# Patient Record
Sex: FEMALE | Race: WHITE | NOT HISPANIC OR LATINO | Employment: OTHER | URBAN - METROPOLITAN AREA
[De-identification: names, ages, dates, MRNs, and addresses within clinical notes are randomized per-mention and may not be internally consistent; named-entity substitution may affect disease eponyms.]

---

## 2017-02-10 ENCOUNTER — OFFICE VISIT (OUTPATIENT)
Dept: INTERNAL MEDICINE | Facility: CLINIC | Age: 82
End: 2017-02-10

## 2017-02-10 VITALS
OXYGEN SATURATION: 95 % | RESPIRATION RATE: 18 BRPM | BODY MASS INDEX: 23.3 KG/M2 | HEART RATE: 75 BPM | WEIGHT: 145 LBS | TEMPERATURE: 97.8 F | SYSTOLIC BLOOD PRESSURE: 132 MMHG | DIASTOLIC BLOOD PRESSURE: 74 MMHG | HEIGHT: 66 IN

## 2017-02-10 DIAGNOSIS — J20.8 ACUTE BRONCHITIS DUE TO OTHER SPECIFIED ORGANISMS: Primary | ICD-10-CM

## 2017-02-10 PROCEDURE — 99213 OFFICE O/P EST LOW 20 MIN: CPT | Performed by: INTERNAL MEDICINE

## 2017-02-10 RX ORDER — GUAIFENESIN AND CODEINE PHOSPHATE 100; 10 MG/5ML; MG/5ML
5 SOLUTION ORAL 3 TIMES DAILY PRN
Qty: 118 ML | Refills: 0 | Status: SHIPPED | OUTPATIENT
Start: 2017-02-10 | End: 2017-08-16 | Stop reason: SDUPTHER

## 2017-02-10 RX ORDER — DOXYCYCLINE 100 MG/1
100 CAPSULE ORAL 2 TIMES DAILY
Qty: 20 CAPSULE | Refills: 0 | Status: SHIPPED | OUTPATIENT
Start: 2017-02-10 | End: 2017-08-16

## 2017-02-10 NOTE — PROGRESS NOTES
Subjective     Cristela Peralta is a 88 y.o. female who presents with   Chief Complaint   Patient presents with   • URI     hoarse, voice loss, cough, x5 days       History of Present Illness     Sick for five days.  Hoarse.  No fever.  Cold with production.  Taking herbals OTC.  Sinus congestion but no pain.  No ear pain.  Very tired.  No SOA.  NO chest pain.  No achiness.      Review of Systems   Constitutional: Negative for fever.   Respiratory: Negative for shortness of breath.    Cardiovascular: Negative for chest pain.       The following portions of the patient's history were reviewed and updated as appropriate: allergies, current medications and problem list.    Patient Active Problem List    Diagnosis Date Noted   • Vitamin D deficiency 10/14/2016   • Depression 05/13/2016   • Diastolic dysfunction 05/13/2016   • Hyperlipidemia 05/13/2016   • Hypertension 05/13/2016   • Chronic low back pain 05/13/2016   • Osteoarthritis 05/13/2016   • Osteopenia 05/13/2016     Note Last Updated: 5/13/2016     S/p five years of bisphosphonates.           Current Outpatient Prescriptions on File Prior to Visit   Medication Sig Dispense Refill   • acyclovir (ZOVIRAX) 5 % cream Apply  topically.     • aspirin 81 MG tablet Take  by mouth daily.     • celecoxib (CeleBREX) 200 MG capsule Take 1 capsule by mouth daily. 90 capsule 0   • citalopram (CeleXA) 10 MG tablet Take 1 tablet by mouth daily. 90 tablet 0   • Co-Enzyme Q-10 15 MG tablet Take  by mouth.     • hydrochlorothiazide (HYDRODIURIL) 12.5 MG tablet Take 1 tablet by mouth daily. 90 tablet 0   • loratadine (CLARITIN) 10 MG tablet Take  by mouth.     • meclizine (ANTIVERT) 25 MG tablet Take  by mouth.     • Multiple Vitamins-Minerals (CENTRUM SILVER) tablet Take  by mouth.     • Omega-3 Fatty Acids (FISH OIL TRIPLE STRENGTH) 1400 MG capsule Take 1 capsule by mouth.     • prednisoLONE acetate (PRED FORTE) 1 % ophthalmic suspension Apply  to eye.     • valACYclovir  "(VALTREX) 500 MG tablet Take  by mouth 2 (two) times a day.     • vitamin C (ASCORBIC ACID) 500 MG tablet Take  by mouth.       No current facility-administered medications on file prior to visit.        Objective     Visit Vitals   • /74   • Pulse 75   • Temp 97.8 °F (36.6 °C)   • Resp 18   • Ht 66\" (167.6 cm)   • Wt 145 lb (65.8 kg)   • SpO2 95%   • BMI 23.4 kg/m2       Physical Exam   Constitutional: She is oriented to person, place, and time. She appears well-developed and well-nourished.   HENT:   Head: Normocephalic and atraumatic.   Right Ear: Hearing and tympanic membrane normal.   Left Ear: Hearing and tympanic membrane normal.   Mouth/Throat: No oropharyngeal exudate or posterior oropharyngeal erythema.   Cardiovascular: Normal rate, regular rhythm and normal heart sounds.    Pulmonary/Chest: Effort normal and breath sounds normal.   Neurological: She is alert and oriented to person, place, and time.   Skin: Skin is warm and dry.   Psychiatric: She has a normal mood and affect. Her behavior is normal.       Assessment/Plan   [place] was seen today for uri.    Diagnoses and all orders for this visit:    Acute bronchitis due to other specified organisms    Other orders  -     guaifenesin-codeine (GUAIFENESIN AC) 100-10 MG/5ML liquid; Take 5 mL by mouth 3 (Three) Times a Day As Needed for cough.  -     doxycycline (MONODOX) 100 MG capsule; Take 1 capsule by mouth 2 (Two) Times a Day.        Discussion  Patient presents with episodes of acute bronchitis.  A prescription for antibiotics is provided today.  The patient is instructed to take along with Mucinex DM and prn codeine at night.  Let me know they are not feeling better over the next 3 days or if there is any change in symptoms.             Future Appointments  Date Time Provider Department Center   10/11/2017 10:00 AM [facility] None   10/18/2017 10:00 AM [facility] None         "

## 2017-07-26 ENCOUNTER — TELEPHONE (OUTPATIENT)
Dept: INTERNAL MEDICINE | Facility: CLINIC | Age: 82
End: 2017-07-26

## 2017-07-26 DIAGNOSIS — M54.50 BILATERAL LOW BACK PAIN WITHOUT SCIATICA, UNSPECIFIED CHRONICITY: Primary | ICD-10-CM

## 2017-07-26 NOTE — TELEPHONE ENCOUNTER
----- Message from Arelis Lara MD sent at 7/26/2017  3:04 PM EDT -----  Order placed.  Have someone fax.   ----- Message -----     From: Tere Cuevas MA     Sent: 7/26/2017  12:58 PM       To: Arelis Lara MD    She needs a order sent to [facility] for re evaluation on her back fax [phone]

## 2017-08-16 ENCOUNTER — OFFICE VISIT (OUTPATIENT)
Dept: INTERNAL MEDICINE | Facility: CLINIC | Age: 82
End: 2017-08-16

## 2017-08-16 VITALS
DIASTOLIC BLOOD PRESSURE: 82 MMHG | WEIGHT: 146 LBS | TEMPERATURE: 97.8 F | OXYGEN SATURATION: 95 % | HEART RATE: 74 BPM | BODY MASS INDEX: 23.46 KG/M2 | SYSTOLIC BLOOD PRESSURE: 134 MMHG | HEIGHT: 66 IN

## 2017-08-16 DIAGNOSIS — J20.8 ACUTE BRONCHITIS DUE TO OTHER SPECIFIED ORGANISMS: Primary | ICD-10-CM

## 2017-08-16 PROCEDURE — 99213 OFFICE O/P EST LOW 20 MIN: CPT | Performed by: INTERNAL MEDICINE

## 2017-08-16 RX ORDER — GUAIFENESIN AND CODEINE PHOSPHATE 100; 10 MG/5ML; MG/5ML
5 SOLUTION ORAL 3 TIMES DAILY PRN
Qty: 118 ML | Refills: 0 | Status: SHIPPED | OUTPATIENT
Start: 2017-08-16 | End: 2017-09-15 | Stop reason: SDUPTHER

## 2017-08-16 RX ORDER — DOXYCYCLINE 100 MG/1
100 CAPSULE ORAL 2 TIMES DAILY
Qty: 20 CAPSULE | Refills: 0 | Status: SHIPPED | OUTPATIENT
Start: 2017-08-16 | End: 2017-09-15 | Stop reason: SDUPTHER

## 2017-08-16 NOTE — PROGRESS NOTES
Subjective     Cristela Peralta is a 89 y.o. female who presents with   Chief Complaint   Patient presents with   • URI   • Nasal Congestion   • Cough       History of Present Illness     Sick since Sunday.  Cough which is non-productive.  No SOA/wheezing.  No fever.  Sore throat.      Review of Systems   HENT: Positive for congestion.        The following portions of the patient's history were reviewed and updated as appropriate: allergies, current medications and problem list.    Patient Active Problem List    Diagnosis Date Noted   • Vitamin D deficiency 10/14/2016   • Depression 05/13/2016   • Diastolic dysfunction 05/13/2016   • Hyperlipidemia 05/13/2016   • Hypertension 05/13/2016   • Chronic low back pain 05/13/2016   • Osteoarthritis 05/13/2016   • Osteopenia 05/13/2016     Note Last Updated: 5/13/2016     S/p five years of bisphosphonates.           Current Outpatient Prescriptions on File Prior to Visit   Medication Sig Dispense Refill   • acyclovir (ZOVIRAX) 5 % cream Apply  topically.     • aspirin 81 MG tablet Take  by mouth daily.     • celecoxib (CeleBREX) 200 MG capsule Take 1 capsule by mouth daily. 90 capsule 0   • citalopram (CeleXA) 10 MG tablet Take 1 tablet by mouth daily. 90 tablet 0   • Co-Enzyme Q-10 15 MG tablet Take  by mouth.     • hydrochlorothiazide (HYDRODIURIL) 12.5 MG tablet Take 1 tablet by mouth daily. 90 tablet 0   • loratadine (CLARITIN) 10 MG tablet Take  by mouth.     • meclizine (ANTIVERT) 25 MG tablet Take  by mouth.     • Multiple Vitamins-Minerals (CENTRUM SILVER) tablet Take  by mouth.     • Omega-3 Fatty Acids (FISH OIL TRIPLE STRENGTH) 1400 MG capsule Take 1 capsule by mouth.     • prednisoLONE acetate (PRED FORTE) 1 % ophthalmic suspension Apply  to eye.     • valACYclovir (VALTREX) 500 MG tablet Take  by mouth 2 (two) times a day.     • vitamin C (ASCORBIC ACID) 500 MG tablet Take  by mouth.     • [DISCONTINUED] guaifenesin-codeine (GUAIFENESIN AC) 100-10 MG/5ML  "liquid Take 5 mL by mouth 3 (Three) Times a Day As Needed for cough. 118 mL 0   • [DISCONTINUED] doxycycline (MONODOX) 100 MG capsule Take 1 capsule by mouth 2 (Two) Times a Day. 20 capsule 0     No current facility-administered medications on file prior to visit.        Objective     /82  Pulse 74  Temp 97.8 °F (36.6 °C)  Ht 66\" (167.6 cm)  Wt 146 lb (66.2 kg)  SpO2 95%  BMI 23.57 kg/m2    Physical Exam   Constitutional: She is oriented to person, place, and time. She appears well-developed and well-nourished.   HENT:   Head: Normocephalic and atraumatic.   Right Ear: Hearing and tympanic membrane normal.   Left Ear: Hearing and tympanic membrane normal.   Mouth/Throat: No oropharyngeal exudate or posterior oropharyngeal erythema.   Cardiovascular: Normal rate, regular rhythm and normal heart sounds.    Pulmonary/Chest: Effort normal and breath sounds normal.   Neurological: She is alert and oriented to person, place, and time.   Skin: Skin is warm and dry.   Psychiatric: She has a normal mood and affect. Her behavior is normal.       Assessment/Plan   [place]ia was seen today for uri, nasal congestion and cough.    Diagnoses and all orders for this visit:    Acute bronchitis due to other specified organisms    Other orders  -     guaifenesin-codeine (GUAIFENESIN AC) 100-10 MG/5ML liquid; Take 5 mL by mouth 3 (Three) Times a Day As Needed for Cough.  -     doxycycline (MONODOX) 100 MG capsule; Take 1 capsule by mouth 2 (Two) Times a Day.        Discussion  Patient presents with episodes of acute bronchitis.  A prescription for antibiotics is provided today.  The patient is instructed to take along with Mucinex DM and prn nighttime cough syrup.  Let me know they are not feeling better over the next 3 days or if there is any change in symptoms.             Future Appointments  Date Time Provider Department Center   10/11/2017 10:00 AM [facility] None   10/18/2017 10:00 AM Arelis TORRES" MD [facility] None

## 2017-09-15 ENCOUNTER — OFFICE VISIT (OUTPATIENT)
Dept: INTERNAL MEDICINE | Facility: CLINIC | Age: 82
End: 2017-09-15

## 2017-09-15 VITALS
WEIGHT: 155 LBS | RESPIRATION RATE: 18 BRPM | BODY MASS INDEX: 24.91 KG/M2 | HEART RATE: 84 BPM | SYSTOLIC BLOOD PRESSURE: 132 MMHG | HEIGHT: 66 IN | DIASTOLIC BLOOD PRESSURE: 70 MMHG | OXYGEN SATURATION: 98 %

## 2017-09-15 DIAGNOSIS — R05.3 PERSISTENT COUGH FOR 3 WEEKS OR LONGER: Primary | ICD-10-CM

## 2017-09-15 DIAGNOSIS — J20.8 ACUTE BRONCHITIS DUE TO OTHER SPECIFIED ORGANISMS: ICD-10-CM

## 2017-09-15 PROCEDURE — 99213 OFFICE O/P EST LOW 20 MIN: CPT | Performed by: INTERNAL MEDICINE

## 2017-09-15 PROCEDURE — 71020 XR CHEST PA AND LATERAL: CPT | Performed by: INTERNAL MEDICINE

## 2017-09-15 RX ORDER — GUAIFENESIN AND CODEINE PHOSPHATE 100; 10 MG/5ML; MG/5ML
5 SOLUTION ORAL 3 TIMES DAILY PRN
Qty: 118 ML | Refills: 0 | Status: SHIPPED | OUTPATIENT
Start: 2017-09-15 | End: 2017-10-18

## 2017-09-15 RX ORDER — METHYLPREDNISOLONE 4 MG/1
TABLET ORAL
Qty: 21 TABLET | Refills: 0 | Status: SHIPPED | OUTPATIENT
Start: 2017-09-15 | End: 2017-10-18

## 2017-09-15 RX ORDER — DOXYCYCLINE 100 MG/1
100 CAPSULE ORAL 2 TIMES DAILY
Qty: 20 CAPSULE | Refills: 0 | Status: SHIPPED | OUTPATIENT
Start: 2017-09-15 | End: 2017-10-18

## 2017-09-15 NOTE — PROGRESS NOTES
Subjective     Cristela Peralta is a 89 y.o. female who presents with   Chief Complaint   Patient presents with   • Cough     x 1 month, congested,        History of Present Illness     Persistent cough.  Phlegm is clear.  No fever.  No SOA.  Nose runs.  No ear pain.  No sore throat.  Coughs at night.  No SOA.  No improvement with antibiotic.      Review of Systems   Constitutional: Negative for fever.   HENT: Negative.    Respiratory: Positive for cough.    Cardiovascular: Negative for chest pain.       The following portions of the patient's history were reviewed and updated as appropriate: allergies, current medications and problem list.    Patient Active Problem List    Diagnosis Date Noted   • Vitamin D deficiency 10/14/2016   • Depression 05/13/2016   • Diastolic dysfunction 05/13/2016   • Hyperlipidemia 05/13/2016   • Hypertension 05/13/2016   • Chronic low back pain 05/13/2016   • Osteoarthritis 05/13/2016   • Osteopenia 05/13/2016     Note Last Updated: 5/13/2016     S/p five years of bisphosphonates.           Current Outpatient Prescriptions on File Prior to Visit   Medication Sig Dispense Refill   • acyclovir (ZOVIRAX) 5 % cream Apply  topically.     • aspirin 81 MG tablet Take  by mouth daily.     • celecoxib (CeleBREX) 200 MG capsule Take 1 capsule by mouth daily. 90 capsule 0   • citalopram (CeleXA) 10 MG tablet Take 1 tablet by mouth daily. 90 tablet 0   • Co-Enzyme Q-10 15 MG tablet Take  by mouth.     • hydrochlorothiazide (HYDRODIURIL) 12.5 MG tablet Take 1 tablet by mouth daily. 90 tablet 0   • loratadine (CLARITIN) 10 MG tablet Take  by mouth.     • meclizine (ANTIVERT) 25 MG tablet Take  by mouth.     • Multiple Vitamins-Minerals (CENTRUM SILVER) tablet Take  by mouth.     • Omega-3 Fatty Acids (FISH OIL TRIPLE STRENGTH) 1400 MG capsule Take 1 capsule by mouth.     • prednisoLONE acetate (PRED FORTE) 1 % ophthalmic suspension Apply  to eye.     • valACYclovir (VALTREX) 500 MG tablet Take  by  "mouth 2 (two) times a day.     • vitamin C (ASCORBIC ACID) 500 MG tablet Take  by mouth.     • [DISCONTINUED] doxycycline (MONODOX) 100 MG capsule Take 1 capsule by mouth 2 (Two) Times a Day. 20 capsule 0   • [DISCONTINUED] guaifenesin-codeine (GUAIFENESIN AC) 100-10 MG/5ML liquid Take 5 mL by mouth 3 (Three) Times a Day As Needed for Cough. 118 mL 0     No current facility-administered medications on file prior to visit.        Objective     /70  Pulse 84  Resp 18  Ht 66\" (167.6 cm)  Wt 155 lb (70.3 kg)  SpO2 98%  BMI 25.02 kg/m2      Physical Exam   Constitutional: She is oriented to person, place, and time. She appears well-developed and well-nourished.   HENT:   Head: Normocephalic and atraumatic.   Right Ear: Hearing and tympanic membrane normal.   Left Ear: Hearing and tympanic membrane normal.   Mouth/Throat: No oropharyngeal exudate or posterior oropharyngeal erythema.   Cardiovascular: Normal rate, regular rhythm and normal heart sounds.    Pulmonary/Chest: Effort normal and breath sounds normal.   Neurological: She is alert and oriented to person, place, and time.   Skin: Skin is warm and dry.   Psychiatric: She has a normal mood and affect. Her behavior is normal.     Procedures    X-rays of the chest  performed today for following indication:   cough.  X-ray reveal nad.  There is no available x-ray for comparison.  X-ray sent to radiology for official interpretation and findings.        Assessment/Plan   [place] was seen today for cough.    Diagnoses and all orders for this visit:    Persistent cough for 3 weeks or longer  -     XR Chest PA & Lateral    Other orders  -     guaifenesin-codeine (GUAIFENESIN AC) 100-10 MG/5ML liquid; Take 5 mL by mouth 3 (Three) Times a Day As Needed for Cough.  -     MethylPREDNISolone (MEDROL, ROGERS,) 4 MG tablet; Take as directed on package instructions.  -     doxycycline (MONODOX) 100 MG capsule; Take 1 capsule by mouth 2 (Two) Times a " Day.        Discussion  Patient presents with a persistent cough.  CXR is NAD.  Rx for antibiotic and steroids.  Take with probiotic.  Let me know if not feeling better over the next 3 days or if there is any change in symptoms.           Future Appointments  Date Time Provider Department Center   10/11/2017 10:00 AM [facility] None   10/18/2017 10:00 AM Arelis Lara MD [facility] None

## 2017-10-11 ENCOUNTER — FLU SHOT (OUTPATIENT)
Dept: INTERNAL MEDICINE | Facility: CLINIC | Age: 82
End: 2017-10-11

## 2017-10-11 ENCOUNTER — LAB (OUTPATIENT)
Dept: INTERNAL MEDICINE | Facility: CLINIC | Age: 82
End: 2017-10-11

## 2017-10-11 DIAGNOSIS — E55.9 VITAMIN D DEFICIENCY: ICD-10-CM

## 2017-10-11 DIAGNOSIS — I10 ESSENTIAL HYPERTENSION: ICD-10-CM

## 2017-10-11 DIAGNOSIS — M19.90 OSTEOARTHRITIS, UNSPECIFIED OSTEOARTHRITIS TYPE, UNSPECIFIED SITE: ICD-10-CM

## 2017-10-11 DIAGNOSIS — M85.80 OSTEOPENIA, UNSPECIFIED LOCATION: ICD-10-CM

## 2017-10-11 DIAGNOSIS — Z00.00 HEALTHCARE MAINTENANCE: ICD-10-CM

## 2017-10-11 DIAGNOSIS — E78.5 HYPERLIPIDEMIA, UNSPECIFIED HYPERLIPIDEMIA TYPE: Primary | ICD-10-CM

## 2017-10-13 LAB
25(OH)D3+25(OH)D2 SERPL-MCNC: 25.2 NG/ML (ref 30–100)
ALBUMIN SERPL-MCNC: 4.1 G/DL (ref 3.5–5.2)
ALBUMIN/GLOB SERPL: 1.5 G/DL
ALP SERPL-CCNC: 92 U/L (ref 39–117)
ALT SERPL-CCNC: 11 U/L (ref 1–33)
APPEARANCE UR: CLEAR
AST SERPL-CCNC: 18 U/L (ref 1–32)
BACTERIA #/AREA URNS HPF: NORMAL /HPF
BACTERIA UR CULT: NORMAL
BACTERIA UR CULT: NORMAL
BASOPHILS # BLD AUTO: 0.02 10*3/MM3 (ref 0–0.2)
BASOPHILS NFR BLD AUTO: 0.5 % (ref 0–1.5)
BILIRUB SERPL-MCNC: 0.4 MG/DL (ref 0.1–1.2)
BILIRUB UR QL STRIP: NEGATIVE
BUN SERPL-MCNC: 17 MG/DL (ref 8–23)
BUN/CREAT SERPL: 23.3 (ref 7–25)
CALCIUM SERPL-MCNC: 9.8 MG/DL (ref 8.6–10.5)
CHLORIDE SERPL-SCNC: 98 MMOL/L (ref 98–107)
CHOLEST SERPL-MCNC: 239 MG/DL (ref 0–200)
CO2 SERPL-SCNC: 29.6 MMOL/L (ref 22–29)
COLOR UR: YELLOW
CREAT SERPL-MCNC: 0.73 MG/DL (ref 0.57–1)
EOSINOPHIL # BLD AUTO: 0.1 10*3/MM3 (ref 0–0.7)
EOSINOPHIL NFR BLD AUTO: 2.5 % (ref 0.3–6.2)
EPI CELLS #/AREA URNS HPF: NORMAL /HPF
ERYTHROCYTE [DISTWIDTH] IN BLOOD BY AUTOMATED COUNT: 13.9 % (ref 11.7–13)
GLOBULIN SER CALC-MCNC: 2.8 GM/DL
GLUCOSE SERPL-MCNC: 100 MG/DL (ref 65–99)
GLUCOSE UR QL: NEGATIVE
HCT VFR BLD AUTO: 38.4 % (ref 35.6–45.5)
HDLC SERPL-MCNC: 57 MG/DL (ref 40–60)
HGB BLD-MCNC: 12.4 G/DL (ref 11.9–15.5)
HGB UR QL STRIP: NEGATIVE
IMM GRANULOCYTES # BLD: 0 10*3/MM3 (ref 0–0.03)
IMM GRANULOCYTES NFR BLD: 0 % (ref 0–0.5)
KETONES UR QL STRIP: ABNORMAL
LDLC SERPL CALC-MCNC: 159 MG/DL (ref 0–100)
LEUKOCYTE ESTERASE UR QL STRIP: ABNORMAL
LYMPHOCYTES # BLD AUTO: 1.42 10*3/MM3 (ref 0.9–4.8)
LYMPHOCYTES NFR BLD AUTO: 35.9 % (ref 19.6–45.3)
MCH RBC QN AUTO: 31.8 PG (ref 26.9–32)
MCHC RBC AUTO-ENTMCNC: 32.3 G/DL (ref 32.4–36.3)
MCV RBC AUTO: 98.5 FL (ref 80.5–98.2)
MICRO URNS: ABNORMAL
MONOCYTES # BLD AUTO: 0.4 10*3/MM3 (ref 0.2–1.2)
MONOCYTES NFR BLD AUTO: 10.1 % (ref 5–12)
MUCOUS THREADS URNS QL MICRO: PRESENT /HPF
NEUTROPHILS # BLD AUTO: 2.01 10*3/MM3 (ref 1.9–8.1)
NEUTROPHILS NFR BLD AUTO: 51 % (ref 42.7–76)
NITRITE UR QL STRIP: NEGATIVE
PH UR STRIP: 6 [PH] (ref 5–7.5)
PLATELET # BLD AUTO: 238 10*3/MM3 (ref 140–500)
POTASSIUM SERPL-SCNC: 4.3 MMOL/L (ref 3.5–5.2)
PROT SERPL-MCNC: 6.9 G/DL (ref 6–8.5)
PROT UR QL STRIP: NEGATIVE
RBC # BLD AUTO: 3.9 10*6/MM3 (ref 3.9–5.2)
RBC #/AREA URNS HPF: NORMAL /HPF
SODIUM SERPL-SCNC: 139 MMOL/L (ref 136–145)
SP GR UR: 1.02 (ref 1–1.03)
T4 FREE SERPL-MCNC: 0.95 NG/DL (ref 0.93–1.7)
TRIGL SERPL-MCNC: 114 MG/DL (ref 0–150)
TSH SERPL DL<=0.005 MIU/L-ACNC: 5.17 MIU/ML (ref 0.27–4.2)
URINALYSIS REFLEX: ABNORMAL
UROBILINOGEN UR STRIP-MCNC: 0.2 MG/DL (ref 0.2–1)
VLDLC SERPL CALC-MCNC: 22.8 MG/DL (ref 5–40)
WBC # BLD AUTO: 3.95 10*3/MM3 (ref 4.5–10.7)
WBC #/AREA URNS HPF: NORMAL /HPF

## 2017-10-18 ENCOUNTER — OFFICE VISIT (OUTPATIENT)
Dept: INTERNAL MEDICINE | Facility: CLINIC | Age: 82
End: 2017-10-18

## 2017-10-18 VITALS
OXYGEN SATURATION: 98 % | HEART RATE: 72 BPM | DIASTOLIC BLOOD PRESSURE: 70 MMHG | WEIGHT: 146 LBS | SYSTOLIC BLOOD PRESSURE: 120 MMHG | BODY MASS INDEX: 23.57 KG/M2

## 2017-10-18 DIAGNOSIS — Z00.00 WELL ADULT EXAM: Primary | ICD-10-CM

## 2017-10-18 DIAGNOSIS — E78.5 HYPERLIPIDEMIA, UNSPECIFIED HYPERLIPIDEMIA TYPE: ICD-10-CM

## 2017-10-18 DIAGNOSIS — G89.29 CHRONIC BILATERAL LOW BACK PAIN, WITH SCIATICA PRESENCE UNSPECIFIED: ICD-10-CM

## 2017-10-18 DIAGNOSIS — M85.80 OSTEOPENIA, UNSPECIFIED LOCATION: ICD-10-CM

## 2017-10-18 DIAGNOSIS — Z78.0 MENOPAUSE: ICD-10-CM

## 2017-10-18 DIAGNOSIS — I10 ESSENTIAL HYPERTENSION: ICD-10-CM

## 2017-10-18 DIAGNOSIS — Z12.31 ENCOUNTER FOR SCREENING MAMMOGRAM FOR BREAST CANCER: ICD-10-CM

## 2017-10-18 DIAGNOSIS — M54.5 CHRONIC BILATERAL LOW BACK PAIN, WITH SCIATICA PRESENCE UNSPECIFIED: ICD-10-CM

## 2017-10-18 DIAGNOSIS — Z00.00 MEDICARE ANNUAL WELLNESS VISIT, SUBSEQUENT: ICD-10-CM

## 2017-10-18 PROCEDURE — 99397 PER PM REEVAL EST PAT 65+ YR: CPT | Performed by: INTERNAL MEDICINE

## 2017-10-18 PROCEDURE — 96160 PT-FOCUSED HLTH RISK ASSMT: CPT | Performed by: INTERNAL MEDICINE

## 2017-10-18 PROCEDURE — 93000 ELECTROCARDIOGRAM COMPLETE: CPT | Performed by: INTERNAL MEDICINE

## 2017-10-18 PROCEDURE — 77080 DXA BONE DENSITY AXIAL: CPT | Performed by: INTERNAL MEDICINE

## 2017-10-18 PROCEDURE — G0439 PPPS, SUBSEQ VISIT: HCPCS | Performed by: INTERNAL MEDICINE

## 2017-10-18 NOTE — PATIENT INSTRUCTIONS
Medicare Wellness  Personal Prevention Plan of Service     Date of Office Visit:  10/18/2017  Encounter Provider:  Arelis Lara MD  Place of Service:  [facility]  Patient Name: Cristela Peralta  :  1928    As part of the Medicare Wellness portion of your visit today, we are providing you with this personalized preventive plan of services (PPPS). This plan is based upon recommendations of the [place] Preventive Services Task Force (USPSTF) and the Advisory Committee on Immunization Practices (ACIP).    This lists the preventive care services that should be considered, and provides dates of when you are due. Items listed as completed are up-to-date and do not require any further intervention.    Health Maintenance   Topic Date Due   • TDAP/TD VACCINES (1 - Tdap) 2007   • MEDICARE ANNUAL WELLNESS  2016   • DXA SCAN  2017   • LIPID PANEL  10/11/2018   • MAMMOGRAM  2018   • INFLUENZA VACCINE  Completed   • PNEUMOCOCCAL VACCINES (65+ LOW/MEDIUM RISK)  Completed   • ZOSTER VACCINE  Completed       Orders Placed This Encounter   Procedures   • Mammo Screening Bilateral With CAD     Standing Status:   Future     Standing Expiration Date:   10/19/2018     Scheduling Instructions:      At [facility]     Order Specific Question:   Reason for Exam:     Answer:   screening   • DEXA Bone Density Axial     Scheduling Instructions:      [facility]     Order Specific Question:   Reason for Exam:     Answer:   osteopenia   • ECG 12 Lead     This order was created via procedure documentation       Return in about 1 year (around 10/18/2018) for Medicare Wellness.

## 2017-10-18 NOTE — PROGRESS NOTES
Subjective     Cristela Peralta is a 89 y.o. female who presents for an annual wellness visit as well as check up of htn, hld, depression, osteopenia.      History of Present Illness     HTN. Control is good at home.  HLD. She is on dietary control only.   Depression. On Celexa and well-controlled.  Osteopenia. On Calcium with D. Discussed trying to get calcium from her diet.  Chronic LBP.  She is maintained on Celebrex with good control.     Review of Systems   HENT: Positive for congestion.    Respiratory: Negative.    Cardiovascular: Negative.        The following portions of the patient's history were reviewed and updated as appropriate: allergies, current medications, past family history, past medical history, past social history, past surgical history and problem list.  Health maintenance tab was reviewed and updated with the patient.       Patient Active Problem List    Diagnosis Date Noted   • Vitamin D deficiency 10/14/2016   • Depression 05/13/2016   • Diastolic dysfunction 05/13/2016   • Hyperlipidemia 05/13/2016   • Hypertension 05/13/2016   • Chronic low back pain 05/13/2016   • Osteoarthritis 05/13/2016   • Osteopenia 05/13/2016     Note Last Updated: 5/13/2016     S/p five years of bisphosphonates.           Past Medical History:   Diagnosis Date   • Degeneration of cervical intervertebral disc    • H/O bone density study 12/11/2015   • History of depression    • History of diastolic dysfunction    • History of diverticulosis    • History of EKG 04/29/2015   • History of herpes genitalis    • History of mammogram 12/11/2015   • History of spinal stenosis    • History of vertigo    • Hyperlipidemia    • Hypertension    • Osteoarthritis    • Osteopenia    • Osteoporosis        Past Surgical History:   Procedure Laterality Date   • APPENDECTOMY     • HYSTERECTOMY     • KNEE SURGERY      Replacement       Family History   Problem Relation Age of Onset   • Aneurysm Father    • Heart disease Father    •  Colon cancer Sister    • Throat cancer Sister        Social History     Social History   • Marital status:      Spouse name: N/A   • Number of children: N/A   • Years of education: N/A     Occupational History   • Not on file.     Social History Main Topics   • Smoking status: Never Smoker   • Smokeless tobacco: Not on file   • Alcohol use No   • Drug use: Not on file   • Sexual activity: Not on file     Other Topics Concern   • Not on file     Social History Narrative       Current Outpatient Prescriptions on File Prior to Visit   Medication Sig Dispense Refill   • acyclovir (ZOVIRAX) 5 % cream Apply  topically.     • aspirin 81 MG tablet Take  by mouth daily.     • celecoxib (CeleBREX) 200 MG capsule Take 1 capsule by mouth daily. 90 capsule 0   • citalopram (CeleXA) 10 MG tablet Take 1 tablet by mouth daily. 90 tablet 0   • Co-Enzyme Q-10 15 MG tablet Take  by mouth.     • hydrochlorothiazide (HYDRODIURIL) 12.5 MG tablet Take 1 tablet by mouth daily. 90 tablet 0   • loratadine (CLARITIN) 10 MG tablet Take  by mouth.     • meclizine (ANTIVERT) 25 MG tablet Take  by mouth.     • Multiple Vitamins-Minerals (CENTRUM SILVER) tablet Take  by mouth.     • Omega-3 Fatty Acids (FISH OIL TRIPLE STRENGTH) 1400 MG capsule Take 1 capsule by mouth.     • prednisoLONE acetate (PRED FORTE) 1 % ophthalmic suspension Apply  to eye.     • valACYclovir (VALTREX) 500 MG tablet Take  by mouth 2 (two) times a day.     • vitamin C (ASCORBIC ACID) 500 MG tablet Take  by mouth.     • [DISCONTINUED] doxycycline (MONODOX) 100 MG capsule Take 1 capsule by mouth 2 (Two) Times a Day. 20 capsule 0   • [DISCONTINUED] guaifenesin-codeine (GUAIFENESIN AC) 100-10 MG/5ML liquid Take 5 mL by mouth 3 (Three) Times a Day As Needed for Cough. 118 mL 0   • [DISCONTINUED] MethylPREDNISolone (MEDROL, ROGERS,) 4 MG tablet Take as directed on package instructions. 21 tablet 0     No current facility-administered medications on file prior to visit.         Allergies   Allergen Reactions   • Alendronate GI Intolerance   • Atorvastatin    • Neomycin-Bacitracin Zn-Polymyx    • Pravastatin    • Sulfa Antibiotics        Immunization History   Administered Date(s) Administered   • Flu Vaccine High Dose PF 65YR+ 10/14/2016, 10/11/2017   • Influenza TIV (IM) 09/25/2015   • Pneumococcal Conjugate 10/20/2015   • Pneumococcal Polysaccharide 08/01/2011   • Td 01/01/2007       Objective     /70  Pulse 72  Wt 146 lb (66.2 kg)  SpO2 98%  BMI 23.57 kg/m2    Physical Exam   Constitutional: She is oriented to person, place, and time. She appears well-developed and well-nourished.   HENT:   Head: Normocephalic and atraumatic.   Right Ear: Hearing, tympanic membrane and external ear normal.   Left Ear: Hearing, tympanic membrane and external ear normal.   Nose: Nose normal.   Mouth/Throat: Oropharynx is clear and moist.   Neck: Neck supple. No thyromegaly present.   Cardiovascular: Normal rate, regular rhythm and normal heart sounds.    No murmur heard.  Pulmonary/Chest: Effort normal and breath sounds normal. Right breast exhibits no mass. Left breast exhibits no mass.   Abdominal: Soft. She exhibits no distension. There is no hepatosplenomegaly. There is no tenderness.   Genitourinary: No breast tenderness.   Lymphadenopathy:     She has no cervical adenopathy.   Neurological: She is alert and oriented to person, place, and time.   Skin: Skin is warm and dry.   Psychiatric: She has a normal mood and affect. Her speech is normal and behavior is normal. Judgment and thought content normal. Cognition and memory are normal.          ECG 12 Lead  Date/Time: 10/18/2017 10:43 AM  Performed by: FREDI GAY  Authorized by: FREDI GAY   Comparison: compared with previous ECG from 10/14/2016  Similar to previous ECG  Rhythm: sinus rhythm  Rate: normal  Conduction: conduction normal  ST Segments: ST segments normal  T Waves: T waves normal  QRS axis: normal  Clinical  impression: normal ECG              Assessment/Plan   [place] was seen today for annual exam.    Diagnoses and all orders for this visit:    Well adult exam    Medicare annual wellness visit, subsequent    Encounter for screening mammogram for breast cancer  -     Mammo Screening Bilateral With CAD; Future    Menopause  -     DEXA Bone Density Axial    Essential hypertension  -     ECG 12 Lead    Hyperlipidemia, unspecified hyperlipidemia type    Osteopenia, unspecified location    Chronic bilateral low back pain, with sciatica presence unspecified        Discussion    AWV.  See scanned forms for levi history, PHQ-9, functional ability questionnaire, cognitive impairment screening.  Direct observation of cognitive abilities:  The patient does not exhibit  any impairment in cognitive abilities upon direct observation at today's visit.   These were all reviewed with the patient and the patient was provided with a personal prevention plan of service in patient instructions.  Patient was given advice or information on the following topics:  nutrition   HTN. Continue current regimen.  HLD. Continue diet alone for now.   Depression. Continue citalopram.   Osteopenia. I recommend to get 1200 mg of calcium and 1000 IUs of vitamin D through diet and to participate in a weight based exercise to prevent loss of bone mineral density. Bone mineral will be monitored every two years.    Chronic LBP.  Continue with Celebrex.     Health Maintenance   Topic Date Due   • TDAP/TD VACCINES (1 - Tdap) 01/02/2007   • MEDICARE ANNUAL WELLNESS  03/18/2016   • DXA SCAN  12/11/2017   • LIPID PANEL  10/11/2018   • MAMMOGRAM  12/13/2018   • INFLUENZA VACCINE  Completed   • PNEUMOCOCCAL VACCINES (65+ LOW/MEDIUM RISK)  Completed   • ZOSTER VACCINE  Completed            No future appointments.

## 2017-11-07 ENCOUNTER — TELEPHONE (OUTPATIENT)
Dept: INTERNAL MEDICINE | Facility: CLINIC | Age: 82
End: 2017-11-07

## 2017-11-07 RX ORDER — HYDROCHLOROTHIAZIDE 12.5 MG/1
12.5 TABLET ORAL DAILY
Qty: 90 TABLET | Refills: 3 | Status: SHIPPED | OUTPATIENT
Start: 2017-11-07 | End: 2018-01-25 | Stop reason: SDUPTHER

## 2017-11-07 RX ORDER — CELECOXIB 200 MG/1
200 CAPSULE ORAL DAILY
Qty: 90 CAPSULE | Refills: 3 | Status: SHIPPED | OUTPATIENT
Start: 2017-11-07 | End: 2018-01-25 | Stop reason: SDUPTHER

## 2017-11-07 RX ORDER — CITALOPRAM 10 MG/1
10 TABLET ORAL DAILY
Qty: 90 TABLET | Refills: 3 | Status: SHIPPED | OUTPATIENT
Start: 2017-11-07 | End: 2018-01-25 | Stop reason: SDUPTHER

## 2017-11-07 NOTE — TELEPHONE ENCOUNTER
Patient needs a refill on Celebrex, Celexa, and hydrochlorothiazide. [phone][facility]

## 2017-12-14 ENCOUNTER — APPOINTMENT (OUTPATIENT)
Dept: WOMENS IMAGING | Facility: HOSPITAL | Age: 82
End: 2017-12-14

## 2017-12-14 PROCEDURE — 77063 BREAST TOMOSYNTHESIS BI: CPT | Performed by: RADIOLOGY

## 2017-12-14 PROCEDURE — G0202 SCR MAMMO BI INCL CAD: HCPCS | Performed by: RADIOLOGY

## 2017-12-14 PROCEDURE — 77080 DXA BONE DENSITY AXIAL: CPT | Performed by: RADIOLOGY

## 2018-01-02 DIAGNOSIS — Q25.40 ABNORMALITY OF THORACIC AORTA: Primary | ICD-10-CM

## 2018-01-10 ENCOUNTER — HOSPITAL ENCOUNTER (OUTPATIENT)
Dept: ULTRASOUND IMAGING | Facility: HOSPITAL | Age: 83
Discharge: HOME | End: 2018-01-10
Admitting: INTERNAL MEDICINE

## 2018-01-10 DIAGNOSIS — Q25.40 ABNORMALITY OF THORACIC AORTA: ICD-10-CM

## 2018-01-10 PROCEDURE — 76775 US EXAM ABDO BACK WALL LIM: CPT

## 2018-01-11 ENCOUNTER — TELEPHONE (OUTPATIENT)
Dept: INTERNAL MEDICINE | Facility: CLINIC | Age: 83
End: 2018-01-11

## 2018-01-11 NOTE — TELEPHONE ENCOUNTER
Patient daughter would like Dr. Lara or Tere to call her before you call he mother regarding her regarding her US results.

## 2018-01-25 ENCOUNTER — TELEPHONE (OUTPATIENT)
Dept: INTERNAL MEDICINE | Facility: CLINIC | Age: 83
End: 2018-01-25

## 2018-01-25 RX ORDER — HYDROCHLOROTHIAZIDE 12.5 MG/1
12.5 TABLET ORAL DAILY
Qty: 90 TABLET | Refills: 3 | Status: SHIPPED | OUTPATIENT
Start: 2018-01-25 | End: 2018-06-29 | Stop reason: SDUPTHER

## 2018-01-25 RX ORDER — CITALOPRAM 10 MG/1
10 TABLET ORAL DAILY
Qty: 90 TABLET | Refills: 3 | Status: SHIPPED | OUTPATIENT
Start: 2018-01-25 | End: 2018-06-29 | Stop reason: SDUPTHER

## 2018-01-25 RX ORDER — CELECOXIB 200 MG/1
200 CAPSULE ORAL DAILY
Qty: 90 CAPSULE | Refills: 3 | Status: SHIPPED | OUTPATIENT
Start: 2018-01-25 | End: 2018-06-29 | Stop reason: SDUPTHER

## 2018-01-25 NOTE — TELEPHONE ENCOUNTER
She would like a referral to [facility] to continue PT on her back. [facility]     Referal is placed.  Please fax to [facility].  BETHANIE

## 2018-01-26 DIAGNOSIS — M54.5 CHRONIC LOW BACK PAIN, UNSPECIFIED BACK PAIN LATERALITY, WITH SCIATICA PRESENCE UNSPECIFIED: Primary | ICD-10-CM

## 2018-01-26 DIAGNOSIS — G89.29 CHRONIC LOW BACK PAIN, UNSPECIFIED BACK PAIN LATERALITY, WITH SCIATICA PRESENCE UNSPECIFIED: Primary | ICD-10-CM

## 2018-06-29 RX ORDER — CELECOXIB 200 MG/1
200 CAPSULE ORAL DAILY
Qty: 90 CAPSULE | Refills: 3 | Status: SHIPPED | OUTPATIENT
Start: 2018-06-29 | End: 2018-10-26 | Stop reason: SDUPTHER

## 2018-06-29 RX ORDER — CITALOPRAM 10 MG/1
10 TABLET ORAL DAILY
Qty: 90 TABLET | Refills: 3 | Status: SHIPPED | OUTPATIENT
Start: 2018-06-29 | End: 2018-10-24 | Stop reason: SDUPTHER

## 2018-06-29 RX ORDER — HYDROCHLOROTHIAZIDE 12.5 MG/1
12.5 TABLET ORAL DAILY
Qty: 90 TABLET | Refills: 3 | Status: SHIPPED | OUTPATIENT
Start: 2018-06-29 | End: 2018-10-24 | Stop reason: SDUPTHER

## 2018-10-16 DIAGNOSIS — I10 ESSENTIAL HYPERTENSION: ICD-10-CM

## 2018-10-16 DIAGNOSIS — E55.9 VITAMIN D DEFICIENCY: ICD-10-CM

## 2018-10-16 DIAGNOSIS — E78.5 HYPERLIPIDEMIA, UNSPECIFIED HYPERLIPIDEMIA TYPE: Primary | ICD-10-CM

## 2018-10-17 ENCOUNTER — FLU SHOT (OUTPATIENT)
Dept: INTERNAL MEDICINE | Facility: CLINIC | Age: 83
End: 2018-10-17

## 2018-10-17 PROCEDURE — 90662 IIV NO PRSV INCREASED AG IM: CPT | Performed by: INTERNAL MEDICINE

## 2018-10-17 PROCEDURE — 90471 IMMUNIZATION ADMIN: CPT | Performed by: INTERNAL MEDICINE

## 2018-10-19 LAB
25(OH)D3+25(OH)D2 SERPL-MCNC: 32.6 NG/ML (ref 30–100)
ALBUMIN SERPL-MCNC: 4.4 G/DL (ref 3.5–5.2)
ALBUMIN/GLOB SERPL: 1.7 G/DL
ALP SERPL-CCNC: 105 U/L (ref 39–117)
ALT SERPL-CCNC: 11 U/L (ref 1–33)
APPEARANCE UR: CLEAR
AST SERPL-CCNC: 17 U/L (ref 1–32)
BACTERIA #/AREA URNS HPF: NORMAL /HPF
BASOPHILS # BLD AUTO: 0.02 10*3/MM3 (ref 0–0.2)
BASOPHILS NFR BLD AUTO: 0.5 % (ref 0–1.5)
BILIRUB SERPL-MCNC: 0.3 MG/DL (ref 0.1–1.2)
BILIRUB UR QL STRIP: NEGATIVE
BUN SERPL-MCNC: 18 MG/DL (ref 8–23)
BUN/CREAT SERPL: 25.7 (ref 7–25)
CALCIUM SERPL-MCNC: 10.1 MG/DL (ref 8.2–9.6)
CASTS URNS MICRO: NORMAL
CHLORIDE SERPL-SCNC: 99 MMOL/L (ref 98–107)
CHOLEST SERPL-MCNC: 213 MG/DL (ref 0–200)
CO2 SERPL-SCNC: 30 MMOL/L (ref 22–29)
COLOR UR: YELLOW
CREAT SERPL-MCNC: 0.7 MG/DL (ref 0.57–1)
EOSINOPHIL # BLD AUTO: 0.12 10*3/MM3 (ref 0–0.7)
EOSINOPHIL NFR BLD AUTO: 3.1 % (ref 0.3–6.2)
EPI CELLS #/AREA URNS HPF: NORMAL /HPF
ERYTHROCYTE [DISTWIDTH] IN BLOOD BY AUTOMATED COUNT: 13.6 % (ref 11.7–13)
GLOBULIN SER CALC-MCNC: 2.6 GM/DL
GLUCOSE SERPL-MCNC: 102 MG/DL (ref 65–99)
GLUCOSE UR QL: NEGATIVE
HCT VFR BLD AUTO: 40.3 % (ref 35.6–45.5)
HDLC SERPL-MCNC: 57 MG/DL (ref 40–60)
HGB BLD-MCNC: 12.6 G/DL (ref 11.9–15.5)
HGB UR QL STRIP: NEGATIVE
IMM GRANULOCYTES # BLD: 0 10*3/MM3 (ref 0–0.03)
IMM GRANULOCYTES NFR BLD: 0 % (ref 0–0.5)
KETONES UR QL STRIP: NEGATIVE
LDLC SERPL CALC-MCNC: 138 MG/DL (ref 0–100)
LEUKOCYTE ESTERASE UR QL STRIP: (no result)
LYMPHOCYTES # BLD AUTO: 1.11 10*3/MM3 (ref 0.9–4.8)
LYMPHOCYTES NFR BLD AUTO: 28.9 % (ref 19.6–45.3)
MCH RBC QN AUTO: 30.8 PG (ref 26.9–32)
MCHC RBC AUTO-ENTMCNC: 31.3 G/DL (ref 32.4–36.3)
MCV RBC AUTO: 98.5 FL (ref 80.5–98.2)
MONOCYTES # BLD AUTO: 0.42 10*3/MM3 (ref 0.2–1.2)
MONOCYTES NFR BLD AUTO: 10.9 % (ref 5–12)
NEUTROPHILS # BLD AUTO: 2.17 10*3/MM3 (ref 1.9–8.1)
NEUTROPHILS NFR BLD AUTO: 56.6 % (ref 42.7–76)
NITRITE UR QL STRIP: NEGATIVE
PH UR STRIP: 7 [PH] (ref 5–8)
PLATELET # BLD AUTO: 247 10*3/MM3 (ref 140–500)
POTASSIUM SERPL-SCNC: 4.9 MMOL/L (ref 3.5–5.2)
PROT SERPL-MCNC: 7 G/DL (ref 6–8.5)
PROT UR QL STRIP: NEGATIVE
RBC # BLD AUTO: 4.09 10*6/MM3 (ref 3.9–5.2)
RBC #/AREA URNS HPF: NORMAL /HPF
SODIUM SERPL-SCNC: 140 MMOL/L (ref 136–145)
SP GR UR: 1.02 (ref 1–1.03)
T4 FREE SERPL-MCNC: 1.08 NG/DL (ref 0.93–1.7)
TRIGL SERPL-MCNC: 90 MG/DL (ref 0–150)
TSH SERPL DL<=0.005 MIU/L-ACNC: 5.62 MIU/ML (ref 0.27–4.2)
UROBILINOGEN UR STRIP-MCNC: (no result) MG/DL
VLDLC SERPL CALC-MCNC: 18 MG/DL (ref 5–40)
WBC # BLD AUTO: 3.84 10*3/MM3 (ref 4.5–10.7)
WBC #/AREA URNS HPF: NORMAL /HPF

## 2018-10-24 ENCOUNTER — OFFICE VISIT (OUTPATIENT)
Dept: INTERNAL MEDICINE | Facility: CLINIC | Age: 83
End: 2018-10-24

## 2018-10-24 VITALS
BODY MASS INDEX: 23.14 KG/M2 | OXYGEN SATURATION: 97 % | WEIGHT: 144 LBS | DIASTOLIC BLOOD PRESSURE: 82 MMHG | RESPIRATION RATE: 15 BRPM | HEIGHT: 66 IN | HEART RATE: 73 BPM | SYSTOLIC BLOOD PRESSURE: 160 MMHG | TEMPERATURE: 97.8 F

## 2018-10-24 DIAGNOSIS — E78.5 HYPERLIPIDEMIA, UNSPECIFIED HYPERLIPIDEMIA TYPE: ICD-10-CM

## 2018-10-24 DIAGNOSIS — M19.90 OSTEOARTHRITIS, UNSPECIFIED OSTEOARTHRITIS TYPE, UNSPECIFIED SITE: ICD-10-CM

## 2018-10-24 DIAGNOSIS — I10 ESSENTIAL HYPERTENSION: ICD-10-CM

## 2018-10-24 DIAGNOSIS — Z00.00 MEDICARE ANNUAL WELLNESS VISIT, SUBSEQUENT: Primary | ICD-10-CM

## 2018-10-24 DIAGNOSIS — F32.0 MILD SINGLE CURRENT EPISODE OF MAJOR DEPRESSIVE DISORDER (HCC): ICD-10-CM

## 2018-10-24 PROCEDURE — 93000 ELECTROCARDIOGRAM COMPLETE: CPT | Performed by: INTERNAL MEDICINE

## 2018-10-24 PROCEDURE — G0439 PPPS, SUBSEQ VISIT: HCPCS | Performed by: INTERNAL MEDICINE

## 2018-10-24 PROCEDURE — 96160 PT-FOCUSED HLTH RISK ASSMT: CPT | Performed by: INTERNAL MEDICINE

## 2018-10-24 PROCEDURE — 99214 OFFICE O/P EST MOD 30 MIN: CPT | Performed by: INTERNAL MEDICINE

## 2018-10-24 RX ORDER — ACYCLOVIR 50 MG/G
OINTMENT TOPICAL EVERY 12 HOURS
Qty: 45 G | Refills: 11 | Status: SHIPPED | OUTPATIENT
Start: 2018-10-24 | End: 2018-12-11

## 2018-10-24 RX ORDER — CITALOPRAM 10 MG/1
10 TABLET ORAL DAILY
Qty: 90 TABLET | Refills: 3 | Status: SHIPPED | OUTPATIENT
Start: 2018-10-24 | End: 2019-10-31 | Stop reason: SDUPTHER

## 2018-10-24 RX ORDER — CETIRIZINE HYDROCHLORIDE 10 MG/1
10 TABLET ORAL DAILY
COMMUNITY
Start: 2018-10-24

## 2018-10-24 RX ORDER — ACYCLOVIR 50 MG/G
OINTMENT TOPICAL EVERY 12 HOURS
Qty: 15 G | Refills: 11 | Status: SHIPPED | OUTPATIENT
Start: 2018-10-24 | End: 2018-10-24 | Stop reason: SDUPTHER

## 2018-10-24 RX ORDER — HYDROCHLOROTHIAZIDE 12.5 MG/1
12.5 TABLET ORAL DAILY
Qty: 90 TABLET | Refills: 3 | Status: SHIPPED | OUTPATIENT
Start: 2018-10-24 | End: 2019-10-31 | Stop reason: SDUPTHER

## 2018-10-24 NOTE — PROGRESS NOTES
Subjective     Cristela Peralta is a 89 y.o. female who presents for an annual wellness visit as well as check up of htn, hld, depression.      History of Present Illness     HTN. Control is good at home.  HLD. She is on dietary control only.   Depression. On Celexa and well-controlled.  Chronic LBP.  She is maintained on Celebrex with good control.        Review of Systems   Constitutional: Negative.    HENT: Negative.    Eyes: Negative.    Respiratory: Negative.    Cardiovascular: Negative.    Gastrointestinal: Negative.    Endocrine: Negative.    Genitourinary: Negative.    Musculoskeletal: Negative.    Skin: Negative.    Allergic/Immunologic: Negative.    Neurological: Negative.    Hematological: Negative.    Psychiatric/Behavioral: Negative.        The following portions of the patient's history were reviewed and updated as appropriate: allergies, current medications, past family history, past medical history, past social history, past surgical history and problem list.  Health maintenance tab was reviewed and updated with the patient.       Patient Active Problem List    Diagnosis Date Noted   • Mild single current episode of major depressive disorder (CMS/[facility]) 10/24/2018   • Vitamin D deficiency 10/14/2016   • Diastolic dysfunction 05/13/2016   • Hyperlipidemia 05/13/2016   • Hypertension 05/13/2016   • Chronic low back pain 05/13/2016   • Osteoarthritis 05/13/2016   • Osteopenia 05/13/2016     Note Last Updated: 5/13/2016     S/p five years of bisphosphonates.           Past Medical History:   Diagnosis Date   • Degeneration of cervical intervertebral disc    • H/O bone density study 12/11/2015   • History of depression    • History of diastolic dysfunction    • History of diverticulosis    • History of EKG 04/29/2015   • History of herpes genitalis    • History of mammogram 12/11/2015   • History of spinal stenosis    • History of vertigo    • Hyperlipidemia    • Hypertension    • Osteoarthritis    •  Osteopenia    • Osteoporosis        Past Surgical History:   Procedure Laterality Date   • APPENDECTOMY     • HYSTERECTOMY     • KNEE SURGERY      Replacement       Family History   Problem Relation Age of Onset   • Aneurysm Father    • Heart disease Father    • Colon cancer Sister    • Throat cancer Sister        Social History     Social History   • Marital status:      Spouse name: N/A   • Number of children: N/A   • Years of education: N/A     Occupational History   • Not on file.     Social History Main Topics   • Smoking status: Never Smoker   • Smokeless tobacco: Not on file   • Alcohol use No   • Drug use: Unknown   • Sexual activity: Not on file     Other Topics Concern   • Not on file     Social History Narrative   • No narrative on file       Current Outpatient Prescriptions on File Prior to Visit   Medication Sig Dispense Refill   • aspirin 81 MG tablet Take  by mouth daily.     • celecoxib (CeleBREX) 200 MG capsule Take 1 capsule by mouth Daily. 90 capsule 3   • Co-Enzyme Q-10 15 MG tablet Take  by mouth.     • Multiple Vitamins-Minerals (CENTRUM SILVER) tablet Take  by mouth.     • Omega-3 Fatty Acids (FISH OIL TRIPLE STRENGTH) 1400 MG capsule Take 1 capsule by mouth.     • vitamin C (ASCORBIC ACID) 500 MG tablet Take  by mouth.     • [DISCONTINUED] citalopram (CeleXA) 10 MG tablet Take 1 tablet by mouth Daily. 90 tablet 3   • [DISCONTINUED] hydrochlorothiazide (HYDRODIURIL) 12.5 MG tablet Take 1 tablet by mouth Daily. 90 tablet 3   • [DISCONTINUED] loratadine (CLARITIN) 10 MG tablet Take  by mouth.     • [DISCONTINUED] acyclovir (ZOVIRAX) 5 % cream Apply  topically.     • [DISCONTINUED] meclizine (ANTIVERT) 25 MG tablet Take  by mouth.     • [DISCONTINUED] prednisoLONE acetate (PRED FORTE) 1 % ophthalmic suspension Apply  to eye.     • [DISCONTINUED] valACYclovir (VALTREX) 500 MG tablet Take  by mouth 2 (two) times a day.       No current facility-administered medications on file prior to  "visit.        Allergies   Allergen Reactions   • Alendronate GI Intolerance   • Atorvastatin    • Neomycin-Bacitracin Zn-Polymyx    • Pravastatin    • Sulfa Antibiotics        Immunization History   Administered Date(s) Administered   • Flu Vaccine High Dose PF 65YR+ 10/14/2016, 10/11/2017, 10/17/2018   • Influenza TIV (IM) 09/25/2015   • Pneumococcal Conjugate 13-Valent (PCV13) 10/20/2015   • Pneumococcal Polysaccharide (PPSV23) 08/01/2011   • Td 01/01/2007       Objective     /82 (BP Location: Right arm, Patient Position: Sitting, Cuff Size: Adult)   Pulse 73   Temp 97.8 °F (36.6 °C) (Oral)   Resp 15   Ht 167.6 cm (65.98\")   Wt 65.3 kg (144 lb)   SpO2 97%   BMI 23.25 kg/m²     Physical Exam   Constitutional: She is oriented to person, place, and time. She appears well-developed and well-nourished.   HENT:   Head: Normocephalic and atraumatic.   Right Ear: Hearing, tympanic membrane and external ear normal.   Left Ear: Hearing, tympanic membrane and external ear normal.   Nose: Nose normal.   Mouth/Throat: Oropharynx is clear and moist.   Neck: Neck supple. No thyromegaly present.   Cardiovascular: Normal rate, regular rhythm and normal heart sounds.    No murmur heard.  Pulmonary/Chest: Effort normal and breath sounds normal. Right breast exhibits no mass. Left breast exhibits no mass.   Abdominal: Soft. She exhibits no distension. There is no hepatosplenomegaly. There is no tenderness.   Genitourinary: No breast tenderness.   Lymphadenopathy:     She has no cervical adenopathy.   Neurological: She is alert and oriented to person, place, and time.   Skin: Skin is warm and dry.   Psychiatric: She has a normal mood and affect. Her speech is normal and behavior is normal. Judgment and thought content normal. Cognition and memory are normal.          ECG 12 Lead  Date/Time: 10/24/2018 10:49 AM  Performed by: FREDI GAY  Authorized by: FREDI GAY   Comparison: compared with previous ECG "   Rhythm: sinus rhythm  Rate: normal  Conduction: conduction normal  ST Segments: ST segments normal  T Waves: T waves normal  QRS axis: normal  Clinical impression: normal ECG              Assessment/Plan   [place] was seen today for annual exam.    Diagnoses and all orders for this visit:    Medicare annual wellness visit, subsequent    Mild single current episode of major depressive disorder (CMS/HCC)    Essential hypertension  -     ECG 12 Lead    Hyperlipidemia, unspecified hyperlipidemia type  -     ECG 12 Lead    Osteoarthritis, unspecified osteoarthritis type, unspecified site    Other orders  -     Discontinue: acyclovir (ZOVIRAX) 5 % ointment; Apply  topically to the appropriate area as directed Every 12 (Twelve) Hours.  -     hydrochlorothiazide (HYDRODIURIL) 12.5 MG tablet; Take 1 tablet by mouth Daily.  -     citalopram (CeleXA) 10 MG tablet; Take 1 tablet by mouth Daily.  -     acyclovir (ZOVIRAX) 5 % ointment; Apply  topically to the appropriate area as directed Every 12 (Twelve) Hours.        Discussion    AWV.  See scanned forms and/or computer for levi history, PHQ-9, functional ability questionnaire, cognitive impairment screening.  Direct observation of cognitive abilities:  The patient does not exhibit any impairment in cognitive abilities upon direct observation at today's visit.   These were all reviewed with the patient and the patient was provided with a personal prevention plan of service in patient instructions.  Patient was given advice or information on the following topics:  nutrition, exercise  HTN. Continue current regimen.  They will monitor at home and call in checks.   HLD. Continue diet alone for now.   Depression. Continue citalopram.   OA.  Continue Celebrex.    I have recommended that the patient get the following immunizations:  Shingrix, tdap and hepatitis A.        Health Maintenance   Topic Date Due   • TDAP/TD VACCINES (1 - Tdap) 01/02/2007   • ZOSTER VACCINE (2 of 2)  02/26/2007   • MEDICARE ANNUAL WELLNESS  10/18/2018   • LIPID PANEL  10/19/2019   • MAMMOGRAM  12/14/2019   • DXA SCAN  12/21/2019   • INFLUENZA VACCINE  Completed   • PNEUMOCOCCAL VACCINES (65+ LOW/MEDIUM RISK)  Completed            No future appointments.

## 2018-10-26 RX ORDER — CELECOXIB 200 MG/1
200 CAPSULE ORAL DAILY
Qty: 90 CAPSULE | Refills: 3 | Status: SHIPPED | OUTPATIENT
Start: 2018-10-26 | End: 2019-08-16 | Stop reason: SDUPTHER

## 2018-10-26 RX ORDER — VALACYCLOVIR HYDROCHLORIDE 500 MG/1
500 TABLET, FILM COATED ORAL 2 TIMES DAILY PRN
Qty: 180 TABLET | Refills: 3 | Status: SHIPPED | OUTPATIENT
Start: 2018-10-26 | End: 2019-10-31 | Stop reason: SDUPTHER

## 2018-10-26 NOTE — TELEPHONE ENCOUNTER
Patient Acyclovir was approved by insurance. Her daughter states its to much money. It there a way she can have the tablet or capsule which is cheaper. This needs to be sent to her mail order.   JUDY    Advise daughter I sent in.  BETHANIE

## 2018-12-17 ENCOUNTER — APPOINTMENT (OUTPATIENT)
Dept: WOMENS IMAGING | Facility: HOSPITAL | Age: 83
End: 2018-12-17

## 2018-12-17 PROCEDURE — 77063 BREAST TOMOSYNTHESIS BI: CPT | Performed by: RADIOLOGY

## 2018-12-17 PROCEDURE — 77067 SCR MAMMO BI INCL CAD: CPT | Performed by: RADIOLOGY

## 2019-02-04 ENCOUNTER — OFFICE VISIT (OUTPATIENT)
Dept: INTERNAL MEDICINE | Facility: CLINIC | Age: 84
End: 2019-02-04

## 2019-02-04 VITALS
WEIGHT: 141 LBS | TEMPERATURE: 97.6 F | HEART RATE: 68 BPM | OXYGEN SATURATION: 98 % | DIASTOLIC BLOOD PRESSURE: 86 MMHG | BODY MASS INDEX: 22.77 KG/M2 | SYSTOLIC BLOOD PRESSURE: 130 MMHG

## 2019-02-04 DIAGNOSIS — J20.8 ACUTE BRONCHITIS DUE TO OTHER SPECIFIED ORGANISMS: Primary | ICD-10-CM

## 2019-02-04 DIAGNOSIS — I51.89 DIASTOLIC DYSFUNCTION: ICD-10-CM

## 2019-02-04 DIAGNOSIS — E78.5 HYPERLIPIDEMIA, UNSPECIFIED HYPERLIPIDEMIA TYPE: ICD-10-CM

## 2019-02-04 DIAGNOSIS — R54 ADVANCED AGE: ICD-10-CM

## 2019-02-04 PROCEDURE — 99214 OFFICE O/P EST MOD 30 MIN: CPT | Performed by: INTERNAL MEDICINE

## 2019-02-04 RX ORDER — GUAIFENESIN AND CODEINE PHOSPHATE 100; 10 MG/5ML; MG/5ML
5 SOLUTION ORAL 3 TIMES DAILY PRN
Qty: 118 ML | Refills: 0 | Status: SHIPPED | OUTPATIENT
Start: 2019-02-04 | End: 2019-10-30

## 2019-02-04 RX ORDER — DOXYCYCLINE 100 MG/1
100 CAPSULE ORAL EVERY 12 HOURS SCHEDULED
Qty: 20 CAPSULE | Refills: 0 | Status: SHIPPED | OUTPATIENT
Start: 2019-02-04 | End: 2019-10-30

## 2019-02-04 RX ORDER — PREDNISOLONE ACETATE 10 MG/ML
1 SUSPENSION/ DROPS OPHTHALMIC DAILY
COMMUNITY
Start: 2019-01-30

## 2019-02-04 NOTE — PROGRESS NOTES
Subjective     Cristela Peralta is a 89 y.o. female who presents with   Chief Complaint   Patient presents with   • Cough   • URI       History of Present Illness     She has been sick for ten days.  She saw the [facility] clinic one week ago and had a flu and strep swab.  No meds given.  No fever.  Head and nasal congestion.  Cough is associated with clear sputum.  No SOA.  No wheezing.  Moderate severity of constant symptoms.  Flonase no help.  She is 89 and her chronic medication issues include HTN and diastolic dysfunction.      Review of Systems   Constitutional: Positive for fatigue. Negative for fever.   HENT: Positive for congestion and sore throat.    Respiratory: Positive for chest tightness, shortness of breath and wheezing.    Cardiovascular: Negative for chest pain.   Musculoskeletal: Positive for arthralgias.       The following portions of the patient's history were reviewed and updated as appropriate: allergies, current medications and problem list.    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           Current Outpatient Medications on File Prior to Visit   Medication Sig Dispense Refill   • aspirin 81 MG tablet Take  by mouth daily.     • celecoxib (CeleBREX) 200 MG capsule Take 1 capsule by mouth Daily. 90 capsule 3   • cetirizine (zyrTEC) 10 MG tablet Take 1 tablet by mouth Daily.     • citalopram (CeleXA) 10 MG tablet Take 1 tablet by mouth Daily. 90 tablet 3   • Co-Enzyme Q-10 15 MG tablet Take  by mouth.     • hydrochlorothiazide (HYDRODIURIL) 12.5 MG tablet Take 1 tablet by mouth Daily. 90 tablet 3   • Multiple  Vitamins-Minerals (CENTRUM SILVER) tablet Take  by mouth.     • Omega-3 Fatty Acids (FISH OIL TRIPLE STRENGTH) 1400 MG capsule Take 1 capsule by mouth.     • prednisoLONE acetate (PRED FORTE) 1 % ophthalmic suspension      • valACYclovir (VALTREX) 500 MG tablet Take 1 tablet by mouth 2 (Two) Times a Day As Needed (outbreak). For three days as needed. 180 tablet 3   • vitamin C (ASCORBIC ACID) 500 MG tablet Take  by mouth.       No current facility-administered medications on file prior to visit.        Objective     /86   Pulse 68   Temp 97.6 °F (36.4 °C)   Wt 64 kg (141 lb)   SpO2 98%   BMI 22.77 kg/m²     Physical Exam   Constitutional: She is oriented to person, place, and time. She appears well-developed and well-nourished.   HENT:   Head: Normocephalic and atraumatic.   Right Ear: Hearing and tympanic membrane normal.   Left Ear: Hearing and tympanic membrane normal.   Mouth/Throat: No oropharyngeal exudate or posterior oropharyngeal erythema.   Cardiovascular: Normal rate, regular rhythm and normal heart sounds.   Pulmonary/Chest: Effort normal and breath sounds normal.   Neurological: She is alert and oriented to person, place, and time.   Skin: Skin is warm and dry.   Psychiatric: She has a normal mood and affect. Her behavior is normal.       Assessment/Plan   [place] was seen today for cough and uri.    Diagnoses and all orders for this visit:    Acute bronchitis due to other specified organisms    Advanced age    Diastolic dysfunction    Hyperlipidemia, unspecified hyperlipidemia type    Other orders  -     guaifenesin-codeine (GUAIFENESIN AC) 100-10 MG/5ML liquid; Take 5 mL by mouth 3 (Three) Times a Day As Needed for Cough.  -     doxycycline (MONODOX) 100 MG capsule; Take 1 capsule by mouth Every 12 (Twelve) Hours.        Discussion    Patient presents with episodes of acute bronchitis.  A prescription for antibiotics is provided today.  The patient is instructed to take along with Mucinex  DM. Codeine for use at night only.   Let me know they are not feeling better over the next 3 days or if there is any change in symptoms.             Future Appointments   Date Time Provider Department Center   10/23/2019 10:00 AM [facility] None   10/30/2019 10:00 AM Arelis Lara MD [facility] None

## 2019-06-24 PROBLEM — I73.9 PVD (PERIPHERAL VASCULAR DISEASE): Status: ACTIVE | Noted: 2019-06-24

## 2019-08-19 RX ORDER — CELECOXIB 200 MG/1
CAPSULE ORAL
Qty: 90 CAPSULE | Refills: 3 | Status: SHIPPED | OUTPATIENT
Start: 2019-08-19 | End: 2019-12-30

## 2019-10-23 DIAGNOSIS — E78.5 HYPERLIPIDEMIA, UNSPECIFIED HYPERLIPIDEMIA TYPE: Primary | ICD-10-CM

## 2019-10-23 DIAGNOSIS — E55.9 VITAMIN D DEFICIENCY: ICD-10-CM

## 2019-10-23 DIAGNOSIS — I10 HYPERTENSION, UNSPECIFIED TYPE: ICD-10-CM

## 2019-10-24 LAB
25(OH)D3+25(OH)D2 SERPL-MCNC: 27.4 NG/ML (ref 30–100)
ALBUMIN SERPL-MCNC: 4.3 G/DL (ref 3.5–5.2)
ALBUMIN/GLOB SERPL: 1.8 G/DL
ALP SERPL-CCNC: 109 U/L (ref 39–117)
ALT SERPL-CCNC: 9 U/L (ref 1–33)
APPEARANCE UR: CLEAR
AST SERPL-CCNC: 18 U/L (ref 1–32)
BACTERIA #/AREA URNS HPF: NORMAL /HPF
BASOPHILS # BLD AUTO: 0.03 10*3/MM3 (ref 0–0.2)
BASOPHILS NFR BLD AUTO: 0.9 % (ref 0–1.5)
BILIRUB SERPL-MCNC: 0.4 MG/DL (ref 0.2–1.2)
BILIRUB UR QL STRIP: NEGATIVE
BUN SERPL-MCNC: 15 MG/DL (ref 8–23)
BUN/CREAT SERPL: 23.1 (ref 7–25)
CALCIUM SERPL-MCNC: 9.4 MG/DL (ref 8.2–9.6)
CHLORIDE SERPL-SCNC: 98 MMOL/L (ref 98–107)
CHOLEST SERPL-MCNC: 207 MG/DL (ref 0–200)
CO2 SERPL-SCNC: 31.8 MMOL/L (ref 22–29)
COLOR UR: YELLOW
CREAT SERPL-MCNC: 0.65 MG/DL (ref 0.57–1)
EOSINOPHIL # BLD AUTO: 0.13 10*3/MM3 (ref 0–0.4)
EOSINOPHIL NFR BLD AUTO: 4 % (ref 0.3–6.2)
EPI CELLS #/AREA URNS HPF: NORMAL /HPF
ERYTHROCYTE [DISTWIDTH] IN BLOOD BY AUTOMATED COUNT: 12.8 % (ref 12.3–15.4)
GLOBULIN SER CALC-MCNC: 2.4 GM/DL
GLUCOSE SERPL-MCNC: 93 MG/DL (ref 65–99)
GLUCOSE UR QL: NEGATIVE
HCT VFR BLD AUTO: 36.8 % (ref 34–46.6)
HDLC SERPL-MCNC: 55 MG/DL (ref 40–60)
HGB BLD-MCNC: 12.5 G/DL (ref 12–15.9)
HGB UR QL STRIP: NEGATIVE
IMM GRANULOCYTES # BLD AUTO: 0.01 10*3/MM3 (ref 0–0.05)
IMM GRANULOCYTES NFR BLD AUTO: 0.3 % (ref 0–0.5)
KETONES UR QL STRIP: NEGATIVE
LDLC SERPL CALC-MCNC: 133 MG/DL (ref 0–100)
LEUKOCYTE ESTERASE UR QL STRIP: NEGATIVE
LYMPHOCYTES # BLD AUTO: 1.02 10*3/MM3 (ref 0.7–3.1)
LYMPHOCYTES NFR BLD AUTO: 31.8 % (ref 19.6–45.3)
MCH RBC QN AUTO: 32.6 PG (ref 26.6–33)
MCHC RBC AUTO-ENTMCNC: 34 G/DL (ref 31.5–35.7)
MCV RBC AUTO: 95.8 FL (ref 79–97)
MICRO URNS: NORMAL
MICRO URNS: NORMAL
MONOCYTES # BLD AUTO: 0.4 10*3/MM3 (ref 0.1–0.9)
MONOCYTES NFR BLD AUTO: 12.5 % (ref 5–12)
NEUTROPHILS # BLD AUTO: 1.62 10*3/MM3 (ref 1.7–7)
NEUTROPHILS NFR BLD AUTO: 50.5 % (ref 42.7–76)
NITRITE UR QL STRIP: NEGATIVE
NRBC BLD AUTO-RTO: 0 /100 WBC (ref 0–0.2)
PH UR STRIP: 7 [PH] (ref 5–7.5)
PLATELET # BLD AUTO: 244 10*3/MM3 (ref 140–450)
POTASSIUM SERPL-SCNC: 4.5 MMOL/L (ref 3.5–5.2)
PROT SERPL-MCNC: 6.7 G/DL (ref 6–8.5)
PROT UR QL STRIP: NEGATIVE
RBC # BLD AUTO: 3.84 10*6/MM3 (ref 3.77–5.28)
RBC #/AREA URNS HPF: NORMAL /HPF
SODIUM SERPL-SCNC: 140 MMOL/L (ref 136–145)
SP GR UR: 1.01 (ref 1–1.03)
T4 FREE SERPL-MCNC: 1.01 NG/DL (ref 0.93–1.7)
TRIGL SERPL-MCNC: 93 MG/DL (ref 0–150)
TSH SERPL DL<=0.005 MIU/L-ACNC: 5.24 UIU/ML (ref 0.27–4.2)
URINALYSIS REFLEX: NORMAL
UROBILINOGEN UR STRIP-MCNC: 0.2 MG/DL (ref 0.2–1)
VLDLC SERPL CALC-MCNC: 18.6 MG/DL
WBC # BLD AUTO: 3.21 10*3/MM3 (ref 3.4–10.8)
WBC #/AREA URNS HPF: NORMAL /HPF

## 2019-10-30 ENCOUNTER — TELEPHONE (OUTPATIENT)
Dept: INTERNAL MEDICINE | Facility: CLINIC | Age: 84
End: 2019-10-30

## 2019-10-30 ENCOUNTER — OFFICE VISIT (OUTPATIENT)
Dept: INTERNAL MEDICINE | Facility: CLINIC | Age: 84
End: 2019-10-30

## 2019-10-30 VITALS
HEIGHT: 66 IN | HEART RATE: 82 BPM | SYSTOLIC BLOOD PRESSURE: 142 MMHG | WEIGHT: 143 LBS | BODY MASS INDEX: 22.98 KG/M2 | OXYGEN SATURATION: 98 % | DIASTOLIC BLOOD PRESSURE: 80 MMHG

## 2019-10-30 DIAGNOSIS — I73.9 PVD (PERIPHERAL VASCULAR DISEASE) (HCC): ICD-10-CM

## 2019-10-30 DIAGNOSIS — I10 ESSENTIAL HYPERTENSION: ICD-10-CM

## 2019-10-30 DIAGNOSIS — E78.5 HYPERLIPIDEMIA, UNSPECIFIED HYPERLIPIDEMIA TYPE: ICD-10-CM

## 2019-10-30 DIAGNOSIS — Z00.00 MEDICARE ANNUAL WELLNESS VISIT, SUBSEQUENT: Primary | ICD-10-CM

## 2019-10-30 DIAGNOSIS — F32.0 MILD SINGLE CURRENT EPISODE OF MAJOR DEPRESSIVE DISORDER (HCC): ICD-10-CM

## 2019-10-30 PROCEDURE — 99214 OFFICE O/P EST MOD 30 MIN: CPT | Performed by: INTERNAL MEDICINE

## 2019-10-30 PROCEDURE — G0439 PPPS, SUBSEQ VISIT: HCPCS | Performed by: INTERNAL MEDICINE

## 2019-10-30 NOTE — PROGRESS NOTES
Subjective     Cristela Peralta is a 89 y.o. female who presents for an annual wellness visit as well as check up of htn, hld, depression.      History of Present Illness     HTN. Control is good at home.  HLD. She is on dietary control only.   Depression. On Celexa and well-controlled.  Chronic LBP.  She is maintained on Celebrex with good control.   Discussed risks/benefits of asa.  She has established PAD so we are going to continue after discussion with patient and daughter.    Review of Systems   Constitutional: Negative.    HENT: Negative.    Eyes: Negative.    Respiratory: Negative.    Cardiovascular: Negative.    Gastrointestinal: Negative.    Endocrine: Negative.    Genitourinary: Negative.    Musculoskeletal: Negative.    Skin: Negative.    Allergic/Immunologic: Negative.    Neurological: Negative.    Hematological: Negative.    Psychiatric/Behavioral: Negative.        The following portions of the patient's history were reviewed and updated as appropriate: allergies, current medications, past family history, past medical history, past social history, past surgical history and problem list.  Health maintenance tab was reviewed and updated with the patient.       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           Past Medical History:   Diagnosis Date   • Degeneration of cervical intervertebral disc    • H/O bone density study 12/11/2015   • History of depression    • History of diastolic dysfunction    •  History of diverticulosis    • History of EKG 04/29/2015   • History of herpes genitalis    • History of mammogram 12/11/2015   • History of spinal stenosis    • History of vertigo    • Hyperlipidemia    • Hypertension    • Osteoarthritis    • Osteopenia    • Osteoporosis        Past Surgical History:   Procedure Laterality Date   • APPENDECTOMY     • HYSTERECTOMY     • KNEE SURGERY      Replacement       Family History   Problem Relation Age of Onset   • Aneurysm Father    • Heart disease Father    • Colon cancer Sister    • Throat cancer Sister        Social History     Socioeconomic History   • Marital status:      Spouse name: Not on file   • Number of children: Not on file   • Years of education: Not on file   • Highest education level: Not on file   Tobacco Use   • Smoking status: Never Smoker   • Smokeless tobacco: Never Used   Substance and Sexual Activity   • Alcohol use: No       Current Outpatient Medications on File Prior to Visit   Medication Sig Dispense Refill   • aspirin 81 MG tablet Take  by mouth daily.     • celecoxib (CeleBREX) 200 MG capsule TAKE 1 CAPSULE EVERY DAY 90 capsule 3   • cetirizine (zyrTEC) 10 MG tablet Take 1 tablet by mouth Daily.     • citalopram (CeleXA) 10 MG tablet Take 1 tablet by mouth Daily. 90 tablet 3   • Co-Enzyme Q-10 15 MG tablet Take  by mouth.     • hydrochlorothiazide (HYDRODIURIL) 12.5 MG tablet Take 1 tablet by mouth Daily. 90 tablet 3   • Multiple Vitamins-Minerals (CENTRUM SILVER) tablet Take  by mouth.     • Omega-3 Fatty Acids (FISH OIL TRIPLE STRENGTH) 1400 MG capsule Take 1 capsule by mouth.     • prednisoLONE acetate (PRED FORTE) 1 % ophthalmic suspension      • valACYclovir (VALTREX) 500 MG tablet Take 1 tablet by mouth 2 (Two) Times a Day As Needed (outbreak). For three days as needed. 180 tablet 3   • vitamin C (ASCORBIC ACID) 500 MG tablet Take  by mouth.     • [DISCONTINUED] guaifenesin-codeine (GUAIFENESIN AC) 100-10 MG/5ML liquid Take 5 mL by  "mouth 3 (Three) Times a Day As Needed for Cough. 118 mL 0   • [DISCONTINUED] doxycycline (MONODOX) 100 MG capsule Take 1 capsule by mouth Every 12 (Twelve) Hours. 20 capsule 0     No current facility-administered medications on file prior to visit.        Allergies   Allergen Reactions   • Alendronate GI Intolerance   • Atorvastatin    • Neomycin-Bacitracin Zn-Polymyx    • Pravastatin    • Sulfa Antibiotics        Immunization History   Administered Date(s) Administered   • Flu Vaccine High Dose PF 65YR+ 10/14/2016, 10/11/2017, 10/17/2018   • Influenza TIV (IM) 09/25/2015   • Pneumococcal Conjugate 13-Valent (PCV13) 10/20/2015   • Pneumococcal Polysaccharide (PPSV23) 08/01/2011   • Td 01/01/2007       Objective     /80   Pulse 82   Ht 167.6 cm (65.98\")   Wt 64.9 kg (143 lb)   SpO2 98%   BMI 23.09 kg/m²     Physical Exam   Constitutional: She is oriented to person, place, and time. She appears well-developed and well-nourished.   HENT:   Head: Normocephalic and atraumatic.   Right Ear: Hearing, tympanic membrane and external ear normal.   Left Ear: Hearing, tympanic membrane and external ear normal.   Nose: Nose normal.   Mouth/Throat: Oropharynx is clear and moist.   Neck: Neck supple. No thyromegaly present.   Cardiovascular: Normal rate, regular rhythm and normal heart sounds.   No murmur heard.  Pulmonary/Chest: Effort normal and breath sounds normal.   Abdominal: Soft. She exhibits no distension. There is no hepatosplenomegaly. There is no tenderness.   Lymphadenopathy:     She has no cervical adenopathy.   Neurological: She is alert and oriented to person, place, and time.   Skin: Skin is warm and dry.   Psychiatric: She has a normal mood and affect. Her speech is normal and behavior is normal. Judgment and thought content normal. Cognition and memory are normal.       Assessment/Plan   [place] was seen today for medicare wellness-subsequent.    Diagnoses and all orders for this visit:    Medicare " annual wellness visit, subsequent    Essential hypertension    Hyperlipidemia, unspecified hyperlipidemia type    Mild single current episode of major depressive disorder (CMS/HCC)    PVD (peripheral vascular disease) (CMS/[facility])        Discussion    AWV.  See scanned forms and/or computer for levi history, PHQ-9, functional ability questionnaire, cognitive impairment screening.  Direct observation of cognitive abilities:  The patient does not exhibit any impairment in cognitive abilities upon direct observation at today's visit.     These were all reviewed with the patient and the patient was provided with a personal prevention plan of service in patient instructions.  Patient was given advice or information on the following topics:  nutrition, exercise  HTN. Continue current regimen.  They will monitor at home and call in checks.   HLD. Continue diet alone for now.   Depression. Continue citalopram.   PVD.  Continue aspirin daily.          Health Maintenance   Topic Date Due   • TDAP/TD VACCINES (1 - Tdap) 01/02/2007   • DXA SCAN  12/21/2019   • LIPID PANEL  10/23/2020   • MEDICARE ANNUAL WELLNESS  10/30/2020   • MAMMOGRAM  12/17/2020   • INFLUENZA VACCINE  Completed   • PNEUMOCOCCAL VACCINES (65+ LOW/MEDIUM RISK)  Completed   • ZOSTER VACCINE  Discontinued            No future appointments.

## 2019-10-30 NOTE — PATIENT INSTRUCTIONS
Medicare Wellness  Personal Prevention Plan of Service     Date of Office Visit:  10/30/2019  Encounter Provider:  Arelis Lara MD  Place of Service:  [facility] INTERNAL MEDICINE  Patient Name: Cristela Peralta  :  [id number]    As part of the Medicare Wellness portion of your visit today, we are providing you with this personalized preventive plan of services (PPPS). This plan is based upon recommendations of the [place] Preventive Services Task Force (USPSTF) and the Advisory Committee on Immunization Practices (ACIP).    This lists the preventive care services that should be considered, and provides dates of when you are due. Items listed as completed are up-to-date and do not require any further intervention.    Health Maintenance   Topic Date Due   • TDAP/TD VACCINES (1 - Tdap) 2007   • ZOSTER VACCINE (2 of 2) 2007   • INFLUENZA VACCINE  2019   • MEDICARE ANNUAL WELLNESS  10/24/2019   • DXA SCAN  2019   • LIPID PANEL  10/23/2020   • MAMMOGRAM  2020   • PNEUMOCOCCAL VACCINES (65+ LOW/MEDIUM RISK)  Completed       No orders of the defined types were placed in this encounter.      No Follow-up on file.

## 2019-10-31 RX ORDER — CITALOPRAM 10 MG/1
10 TABLET ORAL DAILY
Qty: 90 TABLET | Refills: 3 | Status: SHIPPED | OUTPATIENT
Start: 2019-10-31 | End: 2020-11-06 | Stop reason: SDUPTHER

## 2019-10-31 RX ORDER — HYDROCHLOROTHIAZIDE 12.5 MG/1
12.5 TABLET ORAL DAILY
Qty: 90 TABLET | Refills: 3 | Status: SHIPPED | OUTPATIENT
Start: 2019-10-31 | End: 2020-11-06 | Stop reason: SDUPTHER

## 2019-10-31 RX ORDER — VALACYCLOVIR HYDROCHLORIDE 500 MG/1
500 TABLET, FILM COATED ORAL 2 TIMES DAILY PRN
Qty: 180 TABLET | Refills: 3 | Status: SHIPPED | OUTPATIENT
Start: 2019-10-31 | End: 2020-01-04 | Stop reason: HOSPADM

## 2019-12-11 ENCOUNTER — OFFICE VISIT (OUTPATIENT)
Dept: INTERNAL MEDICINE | Facility: CLINIC | Age: 84
End: 2019-12-11

## 2019-12-11 VITALS
BODY MASS INDEX: 22.66 KG/M2 | SYSTOLIC BLOOD PRESSURE: 132 MMHG | OXYGEN SATURATION: 97 % | RESPIRATION RATE: 18 BRPM | HEART RATE: 77 BPM | WEIGHT: 141 LBS | TEMPERATURE: 98.2 F | HEIGHT: 66 IN | DIASTOLIC BLOOD PRESSURE: 70 MMHG

## 2019-12-11 DIAGNOSIS — J06.9 VIRAL UPPER RESPIRATORY TRACT INFECTION: Primary | ICD-10-CM

## 2019-12-11 PROCEDURE — 99213 OFFICE O/P EST LOW 20 MIN: CPT | Performed by: NURSE PRACTITIONER

## 2019-12-11 RX ORDER — GUAIFENESIN AND CODEINE PHOSPHATE 100; 10 MG/5ML; MG/5ML
5 SOLUTION ORAL 4 TIMES DAILY PRN
Qty: 180 ML | Refills: 0 | Status: SHIPPED | OUTPATIENT
Start: 2019-12-11 | End: 2019-12-30

## 2019-12-11 RX ORDER — FLUTICASONE PROPIONATE 50 MCG
2 SPRAY, SUSPENSION (ML) NASAL DAILY
Qty: 1 BOTTLE | Refills: 0 | Status: SHIPPED | OUTPATIENT
Start: 2019-12-11 | End: 2020-01-10

## 2019-12-11 RX ORDER — DOXYCYCLINE 100 MG/1
100 CAPSULE ORAL 2 TIMES DAILY
Qty: 20 CAPSULE | Refills: 0 | Status: SHIPPED | OUTPATIENT
Start: 2019-12-11 | End: 2019-12-30

## 2019-12-11 NOTE — PROGRESS NOTES
Subjective   Cristela Peralta is a 89 y.o. female.   Chief Complaint   Patient presents with   • Sore Throat     x 1 day    • Nasal Congestion   • Cough     Vitals:    12/11/19 1436   BP: 132/70   Pulse: 77   Resp: 18   Temp: 98.2 °F (36.8 °C)   SpO2: 97%     No LMP recorded.    Virginia is a patient of Dr Lara who is here for an acute visit     URI    This is a new problem. Episode onset: 2 days  The problem has been unchanged. There has been no fever. Associated symptoms include congestion, coughing (dry hacking ), rhinorrhea and a sore throat. Pertinent negatives include no ear pain, headaches, sinus pain or wheezing. She has tried antihistamine for the symptoms. The treatment provided mild relief.   Her daughter is here with her today.     The following portions of the patient's history were reviewed and updated as appropriate: allergies, current medications, past family history, past medical history, past social history, past surgical history and problem list.    Review of Systems   Constitutional: Positive for fatigue. Negative for chills.   HENT: Positive for congestion, rhinorrhea and sore throat. Negative for ear pain and sinus pain.    Respiratory: Positive for cough (dry hacking ). Negative for chest tightness, shortness of breath and wheezing.    Cardiovascular: Negative.    Musculoskeletal: Negative for arthralgias.   Neurological: Negative for headaches.       Objective   Physical Exam   Constitutional: Vital signs are normal. She appears well-developed and well-nourished. No distress.   HENT:   Head: Normocephalic.   Right Ear: Tympanic membrane and ear canal normal.   Left Ear: Tympanic membrane and ear canal normal.   Nose: Rhinorrhea (clear) present. No mucosal edema. Right sinus exhibits no maxillary sinus tenderness and no frontal sinus tenderness. Left sinus exhibits no maxillary sinus tenderness and no frontal sinus tenderness.   Mouth/Throat: Uvula is midline. No posterior oropharyngeal  erythema (clear drainage noted in the posterior pharynx ).   Wears hearing aids bilaterally    Cardiovascular: Normal rate, regular rhythm and normal heart sounds.   Pulmonary/Chest: Effort normal and breath sounds normal.   Neurological: She is alert.       Assessment/Plan   Virginia was seen today for sore throat, nasal congestion and cough.    Diagnoses and all orders for this visit:    Viral upper respiratory tract infection  -     guaiFENesin-codeine (GUAIFENESIN AC) 100-10 MG/5ML liquid; Take 5 mL by mouth 4 (Four) Times a Day As Needed for Cough.    Other orders  -     doxycycline (MONODOX) 100 MG capsule; Take 1 capsule by mouth 2 (Two) Times a Day.  -     fluticasone (FLONASE) 50 MCG/ACT nasal spray; 2 sprays into the nostril(s) as directed by provider Daily for 30 days.      Symptom treatment for 7-10 days with rest, fluids, flonase salt water gargles and tylenol, I gave her an rx for doxycycline to hold on to if she worsens or does not improve   Follow up if needed

## 2019-12-30 ENCOUNTER — HOSPITAL ENCOUNTER (OUTPATIENT)
Facility: HOSPITAL | Age: 84
Discharge: HOME | End: 2020-01-04
Attending: EMERGENCY MEDICINE | Admitting: EMERGENCY MEDICINE

## 2019-12-30 DIAGNOSIS — K92.2 LOWER GI BLEED: Primary | ICD-10-CM

## 2019-12-30 DIAGNOSIS — K92.1 GASTROINTESTINAL HEMORRHAGE WITH MELENA: ICD-10-CM

## 2019-12-30 LAB
ABO GROUP BLD: NORMAL
ALBUMIN SERPL-MCNC: 3.8 G/DL (ref 3.5–5.2)
ALBUMIN/GLOB SERPL: 1.3 G/DL
ALP SERPL-CCNC: 86 U/L (ref 39–117)
ALT SERPL W P-5'-P-CCNC: 8 U/L (ref 1–33)
ANION GAP SERPL CALCULATED.3IONS-SCNC: 11.2 MMOL/L (ref 5–15)
AST SERPL-CCNC: 19 U/L (ref 1–32)
BASOPHILS # BLD AUTO: 0.03 10*3/MM3 (ref 0–0.2)
BASOPHILS NFR BLD AUTO: 0.7 % (ref 0–1.5)
BILIRUB SERPL-MCNC: 0.2 MG/DL (ref 0.2–1.2)
BLD GP AB SCN SERPL QL: NEGATIVE
BUN BLD-MCNC: 14 MG/DL (ref 8–23)
BUN/CREAT SERPL: 19.7 (ref 7–25)
CALCIUM SPEC-SCNC: 9.1 MG/DL (ref 8.2–9.6)
CHLORIDE SERPL-SCNC: 95 MMOL/L (ref 98–107)
CO2 SERPL-SCNC: 26.8 MMOL/L (ref 22–29)
CREAT BLD-MCNC: 0.71 MG/DL (ref 0.57–1)
DEPRECATED RDW RBC AUTO: 43.3 FL (ref 37–54)
EOSINOPHIL # BLD AUTO: 0.06 10*3/MM3 (ref 0–0.4)
EOSINOPHIL NFR BLD AUTO: 1.5 % (ref 0.3–6.2)
ERYTHROCYTE [DISTWIDTH] IN BLOOD BY AUTOMATED COUNT: 12.3 % (ref 12.3–15.4)
EXPIRATION DATE: ABNORMAL
FECAL OCCULT BLOOD SCREEN, POC: POSITIVE
GFR SERPL CREATININE-BSD FRML MDRD: 77 ML/MIN/1.73
GLOBULIN UR ELPH-MCNC: 2.9 GM/DL
GLUCOSE BLD-MCNC: 99 MG/DL (ref 65–99)
HCT VFR BLD AUTO: 31.6 % (ref 34–46.6)
HGB BLD-MCNC: 10.4 G/DL (ref 12–15.9)
HOLD SPECIMEN: NORMAL
HOLD SPECIMEN: NORMAL
IMM GRANULOCYTES # BLD AUTO: 0.01 10*3/MM3 (ref 0–0.05)
IMM GRANULOCYTES NFR BLD AUTO: 0.2 % (ref 0–0.5)
LYMPHOCYTES # BLD AUTO: 1.16 10*3/MM3 (ref 0.7–3.1)
LYMPHOCYTES NFR BLD AUTO: 28.5 % (ref 19.6–45.3)
Lab: 128
MCH RBC QN AUTO: 32 PG (ref 26.6–33)
MCHC RBC AUTO-ENTMCNC: 32.9 G/DL (ref 31.5–35.7)
MCV RBC AUTO: 97.2 FL (ref 79–97)
MONOCYTES # BLD AUTO: 0.44 10*3/MM3 (ref 0.1–0.9)
MONOCYTES NFR BLD AUTO: 10.8 % (ref 5–12)
NEGATIVE CONTROL: NEGATIVE
NEUTROPHILS # BLD AUTO: 2.37 10*3/MM3 (ref 1.7–7)
NEUTROPHILS NFR BLD AUTO: 58.3 % (ref 42.7–76)
NRBC BLD AUTO-RTO: 0 /100 WBC (ref 0–0.2)
PLATELET # BLD AUTO: 264 10*3/MM3 (ref 140–450)
PMV BLD AUTO: 9.6 FL (ref 6–12)
POSITIVE CONTROL: POSITIVE
POTASSIUM BLD-SCNC: 4.4 MMOL/L (ref 3.5–5.2)
PROT SERPL-MCNC: 6.7 G/DL (ref 6–8.5)
RBC # BLD AUTO: 3.25 10*6/MM3 (ref 3.77–5.28)
RH BLD: POSITIVE
SODIUM BLD-SCNC: 133 MMOL/L (ref 136–145)
T&S EXPIRATION DATE: NORMAL
WBC NRBC COR # BLD: 4.07 10*3/MM3 (ref 3.4–10.8)
WHOLE BLOOD HOLD SPECIMEN: NORMAL
WHOLE BLOOD HOLD SPECIMEN: NORMAL

## 2019-12-30 PROCEDURE — 86900 BLOOD TYPING SEROLOGIC ABO: CPT

## 2019-12-30 PROCEDURE — 80053 COMPREHEN METABOLIC PANEL: CPT

## 2019-12-30 PROCEDURE — 85025 COMPLETE CBC W/AUTO DIFF WBC: CPT

## 2019-12-30 PROCEDURE — 86901 BLOOD TYPING SEROLOGIC RH(D): CPT

## 2019-12-30 PROCEDURE — G0378 HOSPITAL OBSERVATION PER HR: HCPCS

## 2019-12-30 PROCEDURE — 82270 OCCULT BLOOD FECES: CPT | Performed by: EMERGENCY MEDICINE

## 2019-12-30 PROCEDURE — 86850 RBC ANTIBODY SCREEN: CPT

## 2019-12-30 PROCEDURE — 99284 EMERGENCY DEPT VISIT MOD MDM: CPT

## 2019-12-30 PROCEDURE — 36415 COLL VENOUS BLD VENIPUNCTURE: CPT

## 2019-12-30 RX ORDER — ACETAMINOPHEN 160 MG/5ML
650 SOLUTION ORAL EVERY 4 HOURS PRN
Status: DISCONTINUED | OUTPATIENT
Start: 2019-12-30 | End: 2020-01-04 | Stop reason: HOSPADM

## 2019-12-30 RX ORDER — SODIUM CHLORIDE 0.9 % (FLUSH) 0.9 %
10 SYRINGE (ML) INJECTION AS NEEDED
Status: DISCONTINUED | OUTPATIENT
Start: 2019-12-30 | End: 2019-12-31

## 2019-12-30 RX ORDER — ACETAMINOPHEN 650 MG/1
650 SUPPOSITORY RECTAL EVERY 4 HOURS PRN
Status: DISCONTINUED | OUTPATIENT
Start: 2019-12-30 | End: 2020-01-04 | Stop reason: HOSPADM

## 2019-12-30 RX ORDER — HYDROCHLOROTHIAZIDE 12.5 MG/1
12.5 TABLET ORAL DAILY
Status: DISCONTINUED | OUTPATIENT
Start: 2019-12-31 | End: 2020-01-04 | Stop reason: HOSPADM

## 2019-12-30 RX ORDER — CITALOPRAM 10 MG/1
10 TABLET ORAL DAILY
Status: DISCONTINUED | OUTPATIENT
Start: 2019-12-31 | End: 2020-01-04 | Stop reason: HOSPADM

## 2019-12-30 RX ORDER — ONDANSETRON 2 MG/ML
4 INJECTION INTRAMUSCULAR; INTRAVENOUS EVERY 6 HOURS PRN
Status: DISCONTINUED | OUTPATIENT
Start: 2019-12-30 | End: 2020-01-04 | Stop reason: HOSPADM

## 2019-12-30 RX ORDER — PREDNISOLONE ACETATE 10 MG/ML
1 SUSPENSION/ DROPS OPHTHALMIC DAILY
Status: DISCONTINUED | OUTPATIENT
Start: 2019-12-31 | End: 2020-01-04 | Stop reason: HOSPADM

## 2019-12-30 RX ORDER — SODIUM CHLORIDE 0.9 % (FLUSH) 0.9 %
10 SYRINGE (ML) INJECTION EVERY 12 HOURS SCHEDULED
Status: DISCONTINUED | OUTPATIENT
Start: 2019-12-30 | End: 2019-12-31

## 2019-12-30 RX ORDER — ACETAMINOPHEN 325 MG/1
650 TABLET ORAL EVERY 4 HOURS PRN
Status: DISCONTINUED | OUTPATIENT
Start: 2019-12-30 | End: 2020-01-04 | Stop reason: HOSPADM

## 2019-12-30 RX ORDER — CELECOXIB 200 MG/1
200 CAPSULE ORAL DAILY
COMMUNITY
End: 2020-01-13

## 2019-12-30 RX ORDER — SODIUM CHLORIDE 0.9 % (FLUSH) 0.9 %
10 SYRINGE (ML) INJECTION AS NEEDED
Status: DISCONTINUED | OUTPATIENT
Start: 2019-12-30 | End: 2020-01-04 | Stop reason: HOSPADM

## 2019-12-30 RX ADMIN — SODIUM CHLORIDE, PRESERVATIVE FREE 10 ML: 5 INJECTION INTRAVENOUS at 22:54

## 2019-12-31 ENCOUNTER — APPOINTMENT (OUTPATIENT)
Dept: NUCLEAR MEDICINE | Facility: HOSPITAL | Age: 84
End: 2019-12-31

## 2019-12-31 LAB
ANION GAP SERPL CALCULATED.3IONS-SCNC: 11.3 MMOL/L (ref 5–15)
BASOPHILS # BLD AUTO: 0.02 10*3/MM3 (ref 0–0.2)
BASOPHILS NFR BLD AUTO: 0.6 % (ref 0–1.5)
BUN BLD-MCNC: 16 MG/DL (ref 8–23)
BUN/CREAT SERPL: 29.6 (ref 7–25)
CALCIUM SPEC-SCNC: 8.8 MG/DL (ref 8.2–9.6)
CHLORIDE SERPL-SCNC: 101 MMOL/L (ref 98–107)
CO2 SERPL-SCNC: 24.7 MMOL/L (ref 22–29)
CREAT BLD-MCNC: 0.54 MG/DL (ref 0.57–1)
DEPRECATED RDW RBC AUTO: 41.6 FL (ref 37–54)
EOSINOPHIL # BLD AUTO: 0.09 10*3/MM3 (ref 0–0.4)
EOSINOPHIL NFR BLD AUTO: 2.8 % (ref 0.3–6.2)
ERYTHROCYTE [DISTWIDTH] IN BLOOD BY AUTOMATED COUNT: 12.3 % (ref 12.3–15.4)
GFR SERPL CREATININE-BSD FRML MDRD: 106 ML/MIN/1.73
GLUCOSE BLD-MCNC: 98 MG/DL (ref 65–99)
HCT VFR BLD AUTO: 27.5 % (ref 34–46.6)
HCT VFR BLD AUTO: 28.3 % (ref 34–46.6)
HCT VFR BLD AUTO: 28.3 % (ref 34–46.6)
HCT VFR BLD AUTO: 30.2 % (ref 34–46.6)
HGB BLD-MCNC: 9.3 G/DL (ref 12–15.9)
HGB BLD-MCNC: 9.7 G/DL (ref 12–15.9)
HGB BLD-MCNC: 9.7 G/DL (ref 12–15.9)
HGB BLD-MCNC: 9.9 G/DL (ref 12–15.9)
IMM GRANULOCYTES # BLD AUTO: 0.01 10*3/MM3 (ref 0–0.05)
IMM GRANULOCYTES NFR BLD AUTO: 0.3 % (ref 0–0.5)
LYMPHOCYTES # BLD AUTO: 1.16 10*3/MM3 (ref 0.7–3.1)
LYMPHOCYTES NFR BLD AUTO: 35.8 % (ref 19.6–45.3)
MCH RBC QN AUTO: 32.4 PG (ref 26.6–33)
MCHC RBC AUTO-ENTMCNC: 34.3 G/DL (ref 31.5–35.7)
MCV RBC AUTO: 94.6 FL (ref 79–97)
MONOCYTES # BLD AUTO: 0.35 10*3/MM3 (ref 0.1–0.9)
MONOCYTES NFR BLD AUTO: 10.8 % (ref 5–12)
NEUTROPHILS # BLD AUTO: 1.61 10*3/MM3 (ref 1.7–7)
NEUTROPHILS NFR BLD AUTO: 49.7 % (ref 42.7–76)
NRBC BLD AUTO-RTO: 0 /100 WBC (ref 0–0.2)
PLATELET # BLD AUTO: 231 10*3/MM3 (ref 140–450)
PMV BLD AUTO: 9.8 FL (ref 6–12)
POTASSIUM BLD-SCNC: 4 MMOL/L (ref 3.5–5.2)
RBC # BLD AUTO: 2.99 10*6/MM3 (ref 3.77–5.28)
SODIUM BLD-SCNC: 137 MMOL/L (ref 136–145)
WBC NRBC COR # BLD: 3.24 10*3/MM3 (ref 3.4–10.8)

## 2019-12-31 PROCEDURE — 85018 HEMOGLOBIN: CPT | Performed by: INTERNAL MEDICINE

## 2019-12-31 PROCEDURE — 85025 COMPLETE CBC W/AUTO DIFF WBC: CPT | Performed by: INTERNAL MEDICINE

## 2019-12-31 PROCEDURE — 80048 BASIC METABOLIC PNL TOTAL CA: CPT | Performed by: INTERNAL MEDICINE

## 2019-12-31 PROCEDURE — A9270 NON-COVERED ITEM OR SERVICE: HCPCS | Performed by: INTERNAL MEDICINE

## 2019-12-31 PROCEDURE — 85014 HEMATOCRIT: CPT | Performed by: INTERNAL MEDICINE

## 2019-12-31 PROCEDURE — 99223 1ST HOSP IP/OBS HIGH 75: CPT | Performed by: INTERNAL MEDICINE

## 2019-12-31 PROCEDURE — 63710000001 PREDNISOLONE ACETATE 1 % SUSPENSION 5 ML BOTTLE: Performed by: INTERNAL MEDICINE

## 2019-12-31 PROCEDURE — A9560 TC99M LABELED RBC: HCPCS | Performed by: INTERNAL MEDICINE

## 2019-12-31 PROCEDURE — G0378 HOSPITAL OBSERVATION PER HR: HCPCS

## 2019-12-31 PROCEDURE — 63710000001 CITALOPRAM 10 MG TABLET: Performed by: INTERNAL MEDICINE

## 2019-12-31 PROCEDURE — 78278 ACUTE GI BLOOD LOSS IMAGING: CPT

## 2019-12-31 PROCEDURE — 36415 COLL VENOUS BLD VENIPUNCTURE: CPT | Performed by: INTERNAL MEDICINE

## 2019-12-31 PROCEDURE — 0 TECHNETIUM LABELED RED BLOOD CELLS: Performed by: INTERNAL MEDICINE

## 2019-12-31 PROCEDURE — 96374 THER/PROPH/DIAG INJ IV PUSH: CPT

## 2019-12-31 PROCEDURE — 63710000001 HYDROCHLOROTHIAZIDE 12.5 MG CAPSULE: Performed by: INTERNAL MEDICINE

## 2019-12-31 RX ORDER — PANTOPRAZOLE SODIUM 40 MG/10ML
40 INJECTION, POWDER, LYOPHILIZED, FOR SOLUTION INTRAVENOUS EVERY 12 HOURS SCHEDULED
Status: DISCONTINUED | OUTPATIENT
Start: 2019-12-31 | End: 2020-01-01

## 2019-12-31 RX ADMIN — PANTOPRAZOLE SODIUM 40 MG: 40 INJECTION, POWDER, FOR SOLUTION INTRAVENOUS at 21:08

## 2019-12-31 RX ADMIN — CITALOPRAM 10 MG: 10 TABLET, FILM COATED ORAL at 08:17

## 2019-12-31 RX ADMIN — SODIUM CHLORIDE, PRESERVATIVE FREE 10 ML: 5 INJECTION INTRAVENOUS at 08:18

## 2019-12-31 RX ADMIN — HYDROCHLOROTHIAZIDE 12.5 MG: 12.5 CAPSULE ORAL at 08:17

## 2019-12-31 RX ADMIN — PREDNISOLONE ACETATE 1 DROP: 10 SUSPENSION/ DROPS OPHTHALMIC at 08:18

## 2019-12-31 RX ADMIN — TECHNETIUM TC 99M-LABELED RED BLOOD CELLS 1 DOSE: KIT at 15:42

## 2020-01-01 ENCOUNTER — ANESTHESIA EVENT (OUTPATIENT)
Dept: GASTROENTEROLOGY | Facility: HOSPITAL | Age: 85
End: 2020-01-01

## 2020-01-01 ENCOUNTER — ANESTHESIA (OUTPATIENT)
Dept: GASTROENTEROLOGY | Facility: HOSPITAL | Age: 85
End: 2020-01-01

## 2020-01-01 PROBLEM — D62 ACUTE POSTHEMORRHAGIC ANEMIA: Status: ACTIVE | Noted: 2020-01-01

## 2020-01-01 PROBLEM — K92.1 GASTROINTESTINAL HEMORRHAGE WITH MELENA: Status: ACTIVE | Noted: 2019-12-30

## 2020-01-01 PROBLEM — K92.2 LOWER GI BLEED: Status: ACTIVE | Noted: 2019-12-30

## 2020-01-01 LAB
HCT VFR BLD AUTO: 26.3 % (ref 34–46.6)
HCT VFR BLD AUTO: 27.4 % (ref 34–46.6)
HCT VFR BLD AUTO: 28.5 % (ref 34–46.6)
HGB BLD-MCNC: 9 G/DL (ref 12–15.9)
HGB BLD-MCNC: 9.4 G/DL (ref 12–15.9)
HGB BLD-MCNC: 9.5 G/DL (ref 12–15.9)

## 2020-01-01 PROCEDURE — 63710000001 BISACODYL 5 MG TABLET DELAYED-RELEASE: Performed by: INTERNAL MEDICINE

## 2020-01-01 PROCEDURE — A9270 NON-COVERED ITEM OR SERVICE: HCPCS | Performed by: INTERNAL MEDICINE

## 2020-01-01 PROCEDURE — 85018 HEMOGLOBIN: CPT | Performed by: INTERNAL MEDICINE

## 2020-01-01 PROCEDURE — 43235 EGD DIAGNOSTIC BRUSH WASH: CPT | Performed by: INTERNAL MEDICINE

## 2020-01-01 PROCEDURE — 85014 HEMATOCRIT: CPT | Performed by: INTERNAL MEDICINE

## 2020-01-01 PROCEDURE — 63710000001 HYDROCHLOROTHIAZIDE 12.5 MG CAPSULE: Performed by: INTERNAL MEDICINE

## 2020-01-01 PROCEDURE — 63710000001 CITALOPRAM 10 MG TABLET: Performed by: INTERNAL MEDICINE

## 2020-01-01 PROCEDURE — 63710000001 POLYETHYLENE GLYCOL PACK: Performed by: INTERNAL MEDICINE

## 2020-01-01 PROCEDURE — G0378 HOSPITAL OBSERVATION PER HR: HCPCS

## 2020-01-01 PROCEDURE — 25010000002 PROPOFOL 10 MG/ML EMULSION: Performed by: ANESTHESIOLOGY

## 2020-01-01 PROCEDURE — 96376 TX/PRO/DX INJ SAME DRUG ADON: CPT

## 2020-01-01 RX ORDER — LIDOCAINE HYDROCHLORIDE 20 MG/ML
INJECTION, SOLUTION INFILTRATION; PERINEURAL AS NEEDED
Status: DISCONTINUED | OUTPATIENT
Start: 2020-01-01 | End: 2020-01-01 | Stop reason: SURG

## 2020-01-01 RX ORDER — BISACODYL 5 MG/1
10 TABLET, DELAYED RELEASE ORAL ONCE
Status: COMPLETED | OUTPATIENT
Start: 2020-01-01 | End: 2020-01-01

## 2020-01-01 RX ORDER — PROPOFOL 10 MG/ML
VIAL (ML) INTRAVENOUS CONTINUOUS PRN
Status: DISCONTINUED | OUTPATIENT
Start: 2020-01-01 | End: 2020-01-01 | Stop reason: SURG

## 2020-01-01 RX ORDER — POLYETHYLENE GLYCOL 3350 17 G/17G
238 POWDER, FOR SOLUTION ORAL 2 TIMES DAILY
Status: DISPENSED | OUTPATIENT
Start: 2020-01-01 | End: 2020-01-02

## 2020-01-01 RX ORDER — SODIUM CHLORIDE, SODIUM LACTATE, POTASSIUM CHLORIDE, CALCIUM CHLORIDE 600; 310; 30; 20 MG/100ML; MG/100ML; MG/100ML; MG/100ML
INJECTION, SOLUTION INTRAVENOUS CONTINUOUS PRN
Status: DISCONTINUED | OUTPATIENT
Start: 2020-01-01 | End: 2020-01-01 | Stop reason: SURG

## 2020-01-01 RX ORDER — PROPOFOL 10 MG/ML
VIAL (ML) INTRAVENOUS AS NEEDED
Status: DISCONTINUED | OUTPATIENT
Start: 2020-01-01 | End: 2020-01-01 | Stop reason: SURG

## 2020-01-01 RX ADMIN — PANTOPRAZOLE SODIUM 40 MG: 40 INJECTION, POWDER, FOR SOLUTION INTRAVENOUS at 08:43

## 2020-01-01 RX ADMIN — PREDNISOLONE ACETATE 1 DROP: 10 SUSPENSION/ DROPS OPHTHALMIC at 08:43

## 2020-01-01 RX ADMIN — BISACODYL 10 MG: 5 TABLET ORAL at 17:39

## 2020-01-01 RX ADMIN — SODIUM CHLORIDE, PRESERVATIVE FREE 10 ML: 5 INJECTION INTRAVENOUS at 08:43

## 2020-01-01 RX ADMIN — PROPOFOL 160 MCG/KG/MIN: 10 INJECTION, EMULSION INTRAVENOUS at 12:16

## 2020-01-01 RX ADMIN — HYDROCHLOROTHIAZIDE 12.5 MG: 12.5 CAPSULE ORAL at 08:43

## 2020-01-01 RX ADMIN — SODIUM CHLORIDE, POTASSIUM CHLORIDE, SODIUM LACTATE AND CALCIUM CHLORIDE: 600; 310; 30; 20 INJECTION, SOLUTION INTRAVENOUS at 12:00

## 2020-01-01 RX ADMIN — PROPOFOL 50 MG: 10 INJECTION, EMULSION INTRAVENOUS at 12:17

## 2020-01-01 RX ADMIN — POLYETHYLENE GLYCOL 3350 238 G: 17 POWDER, FOR SOLUTION ORAL at 16:36

## 2020-01-01 RX ADMIN — LIDOCAINE HYDROCHLORIDE 40 MG: 20 INJECTION, SOLUTION INFILTRATION; PERINEURAL at 12:05

## 2020-01-01 RX ADMIN — CITALOPRAM 10 MG: 10 TABLET, FILM COATED ORAL at 08:43

## 2020-01-01 NOTE — PROGRESS NOTES
Tagged red blood cell scan results noted-studies suggest gastric source of bleeding.  Patient agreeable to EGD today.  Further recommendations based upon endoscopy.  Hemoglobin reviewed and fairly stable

## 2020-01-01 NOTE — PLAN OF CARE
Problem: Patient Care Overview  Goal: Plan of Care Review  Outcome: Ongoing (interventions implemented as appropriate)  Flowsheets (Taken 1/1/2020 5258)  Progress: no change  Plan of Care Reviewed With: patient  Outcome Summary: VSS. No complaints of pain per pt. p with assist to restroom. on rooom air. NPO after midnight for possible EGD today. Will continue to monitor.

## 2020-01-01 NOTE — ANESTHESIA POSTPROCEDURE EVALUATION
Patient: Cristela Peralta    Procedure Summary     Date:  01/01/20 Room / Location:   [facility] /  [facility]    Anesthesia Start:  1200 Anesthesia Stop:  1226    Procedure:  ESOPHAGOGASTRODUODENOSCOPY (N/A Esophagus) Diagnosis:       Gastrointestinal hemorrhage with melena      (Gastrointestinal hemorrhage with melena [K92.1])    Surgeon:  Yanet Obregon MD Provider:  Segun Monaco MD    Anesthesia Type:  MAC ASA Status:  3          Anesthesia Type: MAC    Vitals  Vitals Value Taken Time   /75 1/1/2020 12:44 PM   Temp     Pulse 77 1/1/2020 12:44 PM   Resp 16 1/1/2020 12:44 PM   SpO2 98 % 1/1/2020 12:44 PM           Post Anesthesia Care and Evaluation    Patient location during evaluation: bedside  Patient participation: complete - patient participated  Level of consciousness: awake  Pain score: 1  Pain management: adequate  Airway patency: patent  Anesthetic complications: No anesthetic complications  PONV Status: controlled  Cardiovascular status: acceptable  Respiratory status: acceptable  Hydration status: acceptable    Comments: --------------------            01/01/20               1304     --------------------   BP:       140/70     Pulse:      78       Resp:       16       Temp:                SpO2:               --------------------

## 2020-01-01 NOTE — PLAN OF CARE
Problem: Patient Care Overview  Goal: Plan of Care Review  Outcome: Ongoing (interventions implemented as appropriate)  Flowsheets (Taken 1/1/2020 4266)  Progress: no change  Plan of Care Reviewed With: patient; family  Outcome Summary: VSS. EGD showed hiatal hernia but no bleeding per Dr Obregon. She will attempt c-scope tmrw. Bowel prep has started. Will monitor

## 2020-01-01 NOTE — ANESTHESIA PREPROCEDURE EVALUATION
Anesthesia Evaluation     Patient summary reviewed and Nursing notes reviewed   NPO Solid Status: > 8 hours             Airway   Mallampati: II  TM distance: >3 FB  Neck ROM: full  no difficulty expected  Dental - normal exam     Pulmonary - negative pulmonary ROS and normal exam   Cardiovascular - normal exam    (+) hypertension,       Neuro/Psych- negative ROS  GI/Hepatic/Renal/Endo    (+)  GI bleeding ,     Musculoskeletal (-) negative ROS    Abdominal  - normal exam   Substance History - negative use     OB/GYN negative ob/gyn ROS         Other                        Anesthesia Plan    ASA 3     MAC     intravenous induction     Anesthetic plan, all risks, benefits, and alternatives have been provided, discussed and informed consent has been obtained with: patient.

## 2020-01-01 NOTE — PROGRESS NOTES
Name: Cristela Peralta ADMIT: 2019   : 1928  PCP: Arelis Lara MD    MRN: [id number] LOS: 2 days   AGE/SEX: 89 y.o. female  ROOM: [facility]     Subjective   Subjective   Still with some melena.  No hematemesis.  No new c/o.  Seen after EGD.     Objective   Objective   Vital Signs  Temp:  [97.7 °F (36.5 °C)-98 °F (36.7 °C)] 98 °F (36.7 °C)  Heart Rate:  [] 78  Resp:  [16-18] 16  BP: (111-158)/(60-76) 145/64  Body mass index is 22.89 kg/m².    Physical Exam   Constitutional: She is oriented to person, place, and time. No distress.   Cardiovascular: Normal rate and regular rhythm.   No murmur heard.  Pulmonary/Chest: Effort normal and breath sounds normal.   Abdominal: Soft. Bowel sounds are normal. She exhibits no distension. There is no tenderness.   Musculoskeletal: Normal range of motion. She exhibits no edema.   Neurological: She is alert and oriented to person, place, and time.   Skin: Skin is warm and dry. She is not diaphoretic.       Results Review:       I reviewed the patient's new clinical results.  Results from last 7 days   Lab Units 20  0754 19  2355 19  1900 19  0759  19  1247   WBC 10*3/mm3  --   --   --  3.24*  --  4.07   HEMOGLOBIN g/dL 9.4* 9.0* 9.9* 9.7*  9.7*   < > 10.4*   PLATELETS 10*3/mm3  --   --   --  231  --  264    < > = values in this interval not displayed.     Results from last 7 days   Lab Units 19  0759 19  1247   SODIUM mmol/L 137 133*   POTASSIUM mmol/L 4.0 4.4   CHLORIDE mmol/L 101 95*   CO2 mmol/L 24.7 26.8   BUN mg/dL 16 14   CREATININE mg/dL 0.54* 0.71   GLUCOSE mg/dL 98 99   Estimated Creatinine Clearance: 46.5 mL/min (A) (by C-G formula based on SCr of 0.54 mg/dL (L)).  Results from last 7 days   Lab Units 19  0759 19  1247   CALCIUM mg/dL 8.8 9.1   ALBUMIN g/dL  --  3.80       Results from last 7 days   Lab Units 19  1758   OCCULT BLOOD, FECAL  Positive*         citalopram 10 mg Oral Daily    hydroCHLOROthiazide 12.5 mg Oral Daily   prednisoLONE acetate 1 drop Left Eye Daily               Assessment/Plan     Active Hospital Problems    Diagnosis  POA   • **Gastrointestinal hemorrhage with melena [K92.1]  Yes   • Acute posthemorrhagic anemia [D62]  Yes   • PVD (peripheral vascular disease) (CMS/HCC) [I73.9]  Yes   • Hypertension [I10]  Yes   • Diastolic dysfunction [I51.89]  Yes      Resolved Hospital Problems   No resolved problems to display.       89 y.o. female with Lower GI bleed.    · Admitted to telemetry unit.  · GI following.  · Continue to monitor vital signs, serial H/H, telemetry and frequency of melena.  · Diet Clear Liquid for now per GI.  · Protonix DC'd per GI.  · Bleeding scan suggestive of possible source in stomach.  EGD was however negative.  · Planning colonoscopy tomorrow.  · SCDs.  · Full code.  · Discussed with patient and nursing staff.    Admission Status:  I certify that this patient requires inpatient hospitalization for greater than 2 midnights in INPATIENT status secondary to acute blood loss anemia with greater than or equal to a 2 gm drop in Hgb from her baseline of 12.5 (down to 9.4 this am).  She also continues to pass melenic stools.  I anticipate there to be the need for care which can only be reasonably provided in a hospital setting such as possible need for aggressive/expedited ancillary services and/or consultation services, IV medications, close physician monitoring, and/or procedures. In such, I feel patient’s risk for adverse outcomes and need for care warrant INPATIENT evaluation and predict the patient’s care encounter to likely last beyond 2 midnights.      Raheel Cabrera MD  [facility]  01/01/20  3:30 PM

## 2020-01-02 ENCOUNTER — ANESTHESIA (OUTPATIENT)
Dept: GASTROENTEROLOGY | Facility: HOSPITAL | Age: 85
End: 2020-01-02

## 2020-01-02 ENCOUNTER — ANESTHESIA EVENT (OUTPATIENT)
Dept: GASTROENTEROLOGY | Facility: HOSPITAL | Age: 85
End: 2020-01-02

## 2020-01-02 LAB
ANION GAP SERPL CALCULATED.3IONS-SCNC: 10.8 MMOL/L (ref 5–15)
BASOPHILS # BLD AUTO: 0.03 10*3/MM3 (ref 0–0.2)
BASOPHILS NFR BLD AUTO: 0.8 % (ref 0–1.5)
BUN BLD-MCNC: 12 MG/DL (ref 8–23)
BUN/CREAT SERPL: 19.4 (ref 7–25)
CALCIUM SPEC-SCNC: 9.2 MG/DL (ref 8.2–9.6)
CHLORIDE SERPL-SCNC: 100 MMOL/L (ref 98–107)
CO2 SERPL-SCNC: 26.2 MMOL/L (ref 22–29)
CREAT BLD-MCNC: 0.62 MG/DL (ref 0.57–1)
DEPRECATED RDW RBC AUTO: 41.3 FL (ref 37–54)
EOSINOPHIL # BLD AUTO: 0.17 10*3/MM3 (ref 0–0.4)
EOSINOPHIL NFR BLD AUTO: 4.3 % (ref 0.3–6.2)
ERYTHROCYTE [DISTWIDTH] IN BLOOD BY AUTOMATED COUNT: 12.2 % (ref 12.3–15.4)
GFR SERPL CREATININE-BSD FRML MDRD: 90 ML/MIN/1.73
GLUCOSE BLD-MCNC: 105 MG/DL (ref 65–99)
HCT VFR BLD AUTO: 28.8 % (ref 34–46.6)
HGB BLD-MCNC: 9.9 G/DL (ref 12–15.9)
IMM GRANULOCYTES # BLD AUTO: 0.01 10*3/MM3 (ref 0–0.05)
IMM GRANULOCYTES NFR BLD AUTO: 0.3 % (ref 0–0.5)
LYMPHOCYTES # BLD AUTO: 1.68 10*3/MM3 (ref 0.7–3.1)
LYMPHOCYTES NFR BLD AUTO: 42.7 % (ref 19.6–45.3)
MCH RBC QN AUTO: 32.7 PG (ref 26.6–33)
MCHC RBC AUTO-ENTMCNC: 34.4 G/DL (ref 31.5–35.7)
MCV RBC AUTO: 95 FL (ref 79–97)
MONOCYTES # BLD AUTO: 0.4 10*3/MM3 (ref 0.1–0.9)
MONOCYTES NFR BLD AUTO: 10.2 % (ref 5–12)
NEUTROPHILS # BLD AUTO: 1.64 10*3/MM3 (ref 1.7–7)
NEUTROPHILS NFR BLD AUTO: 41.7 % (ref 42.7–76)
NRBC BLD AUTO-RTO: 0 /100 WBC (ref 0–0.2)
PLATELET # BLD AUTO: 270 10*3/MM3 (ref 140–450)
PMV BLD AUTO: 9.8 FL (ref 6–12)
POTASSIUM BLD-SCNC: 4 MMOL/L (ref 3.5–5.2)
RBC # BLD AUTO: 3.03 10*6/MM3 (ref 3.77–5.28)
SODIUM BLD-SCNC: 137 MMOL/L (ref 136–145)
WBC NRBC COR # BLD: 3.93 10*3/MM3 (ref 3.4–10.8)

## 2020-01-02 PROCEDURE — A9270 NON-COVERED ITEM OR SERVICE: HCPCS | Performed by: INTERNAL MEDICINE

## 2020-01-02 PROCEDURE — 63710000001 HYDROCHLOROTHIAZIDE 12.5 MG CAPSULE: Performed by: INTERNAL MEDICINE

## 2020-01-02 PROCEDURE — 85025 COMPLETE CBC W/AUTO DIFF WBC: CPT | Performed by: INTERNAL MEDICINE

## 2020-01-02 PROCEDURE — 63710000001 CITALOPRAM 10 MG TABLET: Performed by: INTERNAL MEDICINE

## 2020-01-02 PROCEDURE — 45378 DIAGNOSTIC COLONOSCOPY: CPT | Performed by: INTERNAL MEDICINE

## 2020-01-02 PROCEDURE — 25010000002 PROPOFOL 10 MG/ML EMULSION: Performed by: NURSE ANESTHETIST, CERTIFIED REGISTERED

## 2020-01-02 PROCEDURE — G0378 HOSPITAL OBSERVATION PER HR: HCPCS

## 2020-01-02 PROCEDURE — 80048 BASIC METABOLIC PNL TOTAL CA: CPT | Performed by: HOSPITALIST

## 2020-01-02 RX ORDER — PROMETHAZINE HYDROCHLORIDE 25 MG/1
25 TABLET ORAL ONCE AS NEEDED
Status: DISCONTINUED | OUTPATIENT
Start: 2020-01-02 | End: 2020-01-02 | Stop reason: HOSPADM

## 2020-01-02 RX ORDER — PROMETHAZINE HYDROCHLORIDE 25 MG/1
25 SUPPOSITORY RECTAL ONCE AS NEEDED
Status: DISCONTINUED | OUTPATIENT
Start: 2020-01-02 | End: 2020-01-02 | Stop reason: HOSPADM

## 2020-01-02 RX ORDER — PROPOFOL 10 MG/ML
VIAL (ML) INTRAVENOUS CONTINUOUS PRN
Status: DISCONTINUED | OUTPATIENT
Start: 2020-01-02 | End: 2020-01-02 | Stop reason: SURG

## 2020-01-02 RX ORDER — LIDOCAINE HYDROCHLORIDE 20 MG/ML
INJECTION, SOLUTION INFILTRATION; PERINEURAL AS NEEDED
Status: DISCONTINUED | OUTPATIENT
Start: 2020-01-02 | End: 2020-01-02 | Stop reason: SURG

## 2020-01-02 RX ORDER — PROMETHAZINE HYDROCHLORIDE 25 MG/ML
12.5 INJECTION, SOLUTION INTRAMUSCULAR; INTRAVENOUS ONCE AS NEEDED
Status: DISCONTINUED | OUTPATIENT
Start: 2020-01-02 | End: 2020-01-02 | Stop reason: HOSPADM

## 2020-01-02 RX ORDER — PROPOFOL 10 MG/ML
VIAL (ML) INTRAVENOUS AS NEEDED
Status: DISCONTINUED | OUTPATIENT
Start: 2020-01-02 | End: 2020-01-02 | Stop reason: SURG

## 2020-01-02 RX ORDER — SODIUM CHLORIDE, SODIUM LACTATE, POTASSIUM CHLORIDE, CALCIUM CHLORIDE 600; 310; 30; 20 MG/100ML; MG/100ML; MG/100ML; MG/100ML
30 INJECTION, SOLUTION INTRAVENOUS CONTINUOUS
Status: DISCONTINUED | OUTPATIENT
Start: 2020-01-02 | End: 2020-01-04 | Stop reason: HOSPADM

## 2020-01-02 RX ADMIN — HYDROCHLOROTHIAZIDE 12.5 MG: 12.5 CAPSULE ORAL at 10:43

## 2020-01-02 RX ADMIN — CITALOPRAM 10 MG: 10 TABLET, FILM COATED ORAL at 10:43

## 2020-01-02 RX ADMIN — PROPOFOL 50 MCG/KG/MIN: 10 INJECTION, EMULSION INTRAVENOUS at 09:54

## 2020-01-02 RX ADMIN — PROPOFOL 30 MG: 10 INJECTION, EMULSION INTRAVENOUS at 09:51

## 2020-01-02 RX ADMIN — PROPOFOL 20 MG: 10 INJECTION, EMULSION INTRAVENOUS at 09:53

## 2020-01-02 RX ADMIN — LIDOCAINE HYDROCHLORIDE 60 MG: 20 INJECTION, SOLUTION INFILTRATION; PERINEURAL at 09:51

## 2020-01-02 RX ADMIN — PREDNISOLONE ACETATE 1 DROP: 10 SUSPENSION/ DROPS OPHTHALMIC at 10:43

## 2020-01-02 RX ADMIN — SODIUM CHLORIDE, POTASSIUM CHLORIDE, SODIUM LACTATE AND CALCIUM CHLORIDE 30 ML/HR: 600; 310; 30; 20 INJECTION, SOLUTION INTRAVENOUS at 09:33

## 2020-01-02 NOTE — PROGRESS NOTES
Discharge Planning Assessment  [facility]     Patient Name: Cristela Peralta  MRN: [id number]  Today's Date: 1/2/2020    Admit Date: 12/30/2019    Discharge Needs Assessment     Row Name 01/02/20 1529       Living Environment    Lives With  alone    Current Living Arrangements  home/apartment/condo       Resource/Environmental Concerns    Resource/Environmental Concerns  none       Transition Planning    Patient/Family Anticipates Transition to  home    Patient/Family Anticipated Services at Transition  none    Transportation Anticipated  family or friend will provide       Discharge Needs Assessment    Equipment Currently Used at Home  bath bench        Discharge Plan     Row Name 01/02/20 1529       Plan    Plan  Home    Patient/Family in Agreement with Plan  yes    Plan Comments  Met with pt at bedside   Introduced self, explained CCP role, facesheet verified.  Pt states she is independent with ADLs.  Has grab bars and shower seat in home.  Has used HH 15 years ago, unsure of agency.  [facility].  Plans to return home at discharge, no known needs at this time.  CCP will follow.  VANESSA Mckinnon RN        Destination      Coordination has not been started for this encounter.      Durable Medical Equipment      Coordination has not been started for this encounter.      Dialysis/Infusion      Coordination has not been started for this encounter.      Home Medical Care      Coordination has not been started for this encounter.      Therapy      Coordination has not been started for this encounter.      Community Resources      Coordination has not been started for this encounter.          Demographic Summary     Row Name 01/02/20 1527       General Information    Admission Type  inpatient    Arrived From  home    Referral Source  admission list    Reason for Consult  discharge planning    Preferred Language  English       Contact Information    Permission Granted to Share Info  With  family/designee Buffy Dorsey (daughter) [phone]        Functional Status     Row Name 01/02/20 1528       Functional Status, IADL    Medications  independent    Meal Preparation  independent    Housekeeping  independent    Laundry  independent    Shopping  independent        Psychosocial    No documentation.       Abuse/Neglect    No documentation.       Legal    No documentation.       Substance Abuse    No documentation.       Patient Forms    No documentation.           Elicia Mckinnon RN

## 2020-01-02 NOTE — PLAN OF CARE
Pt. VS wnl, no c.o pain.  Pt. Had colonoscopy today, no chnages in treatment.  Pt. Stool with small dark sediment, no sharif blood noted in stool.  Pt. Tolerating regular diet.   Pt. A&O x4.  Pt. Up with assist, ambulating well.  Pt. Hgb improved.  Pt. Possibly home soon.  Pt. Resting comfortably at present, will continue to monitor closely.      Problem: Patient Care Overview  Goal: Plan of Care Review  Outcome: Ongoing (interventions implemented as appropriate)  Flowsheets (Taken 1/2/2020 4091)  Progress: improving  Plan of Care Reviewed With: patient

## 2020-01-02 NOTE — PLAN OF CARE
Problem: Patient Care Overview  Goal: Plan of Care Review  Outcome: Ongoing (interventions implemented as appropriate)  Flowsheets (Taken 1/2/2020 8597)  Progress: improving  Plan of Care Reviewed With: patient  Outcome Summary: VSS. Bowel prep completed lastnight for colonoscopy today. Consent signed and in chart. May need tap water enema this am. Will continue to monitor.

## 2020-01-02 NOTE — ANESTHESIA PREPROCEDURE EVALUATION
Anesthesia Evaluation     Patient summary reviewed and Nursing notes reviewed   no history of anesthetic complications:  NPO Solid Status: > 8 hours  NPO Liquid Status: > 2 hours           Airway   Mallampati: II  TM distance: >3 FB  Neck ROM: full  No difficulty expected  Dental - normal exam     Pulmonary - negative pulmonary ROS and normal exam    breath sounds clear to auscultation  Cardiovascular     Rhythm: regular  Rate: normal    (+) hypertension less than 2 medications, murmur, PVD, hyperlipidemia,       Neuro/Psych  (+) dizziness/light headedness, psychiatric history Depression,     GI/Hepatic/Renal/Endo    (+)  GI bleeding ,     Musculoskeletal     (+) back pain,   Abdominal  - normal exam   Substance History - negative use     OB/GYN negative ob/gyn ROS         Other   arthritis,                      Anesthesia Plan    ASA 3     MAC     intravenous induction     Anesthetic plan, all risks, benefits, and alternatives have been provided, discussed and informed consent has been obtained with: patient.

## 2020-01-03 LAB
ANION GAP SERPL CALCULATED.3IONS-SCNC: 10.6 MMOL/L (ref 5–15)
BUN BLD-MCNC: 7 MG/DL (ref 8–23)
BUN/CREAT SERPL: 12.5 (ref 7–25)
CALCIUM SPEC-SCNC: 8.6 MG/DL (ref 8.2–9.6)
CHLORIDE SERPL-SCNC: 96 MMOL/L (ref 98–107)
CO2 SERPL-SCNC: 25.4 MMOL/L (ref 22–29)
CREAT BLD-MCNC: 0.56 MG/DL (ref 0.57–1)
DEPRECATED RDW RBC AUTO: 44.9 FL (ref 37–54)
ERYTHROCYTE [DISTWIDTH] IN BLOOD BY AUTOMATED COUNT: 12.7 % (ref 12.3–15.4)
GFR SERPL CREATININE-BSD FRML MDRD: 101 ML/MIN/1.73
GLUCOSE BLD-MCNC: 110 MG/DL (ref 65–99)
HCT VFR BLD AUTO: 26.5 % (ref 34–46.6)
HGB BLD-MCNC: 8.9 G/DL (ref 12–15.9)
MAGNESIUM SERPL-MCNC: 2 MG/DL (ref 1.7–2.3)
MCH RBC QN AUTO: 32.7 PG (ref 26.6–33)
MCHC RBC AUTO-ENTMCNC: 33.6 G/DL (ref 31.5–35.7)
MCV RBC AUTO: 97.4 FL (ref 79–97)
PLATELET # BLD AUTO: 242 10*3/MM3 (ref 140–450)
PMV BLD AUTO: 9.9 FL (ref 6–12)
POTASSIUM BLD-SCNC: 3.8 MMOL/L (ref 3.5–5.2)
RBC # BLD AUTO: 2.72 10*6/MM3 (ref 3.77–5.28)
SODIUM BLD-SCNC: 132 MMOL/L (ref 136–145)
WBC NRBC COR # BLD: 3.63 10*3/MM3 (ref 3.4–10.8)

## 2020-01-03 PROCEDURE — 63710000001 HYDROCHLOROTHIAZIDE 12.5 MG CAPSULE: Performed by: INTERNAL MEDICINE

## 2020-01-03 PROCEDURE — 83735 ASSAY OF MAGNESIUM: CPT | Performed by: INTERNAL MEDICINE

## 2020-01-03 PROCEDURE — 63710000001 CITALOPRAM 10 MG TABLET: Performed by: INTERNAL MEDICINE

## 2020-01-03 PROCEDURE — A9270 NON-COVERED ITEM OR SERVICE: HCPCS | Performed by: INTERNAL MEDICINE

## 2020-01-03 PROCEDURE — 80048 BASIC METABOLIC PNL TOTAL CA: CPT | Performed by: INTERNAL MEDICINE

## 2020-01-03 PROCEDURE — G0378 HOSPITAL OBSERVATION PER HR: HCPCS

## 2020-01-03 PROCEDURE — 85027 COMPLETE CBC AUTOMATED: CPT | Performed by: INTERNAL MEDICINE

## 2020-01-03 PROCEDURE — 99214 OFFICE O/P EST MOD 30 MIN: CPT | Performed by: INTERNAL MEDICINE

## 2020-01-03 RX ADMIN — PREDNISOLONE ACETATE 1 DROP: 10 SUSPENSION/ DROPS OPHTHALMIC at 09:48

## 2020-01-03 RX ADMIN — HYDROCHLOROTHIAZIDE 12.5 MG: 12.5 CAPSULE ORAL at 09:47

## 2020-01-03 RX ADMIN — CITALOPRAM 10 MG: 10 TABLET, FILM COATED ORAL at 09:47

## 2020-01-03 NOTE — PROGRESS NOTES
[facility]  Inpatient Progress Note    Reason for Follow Up:  GI bleeding, anemia    Subjective     Interval History:   She reports small BM after attempted colonoscopy yesterday, no blood.    Colonoscopy was attempted but could not advance beyond distal sigmoid colon due to fixed angulation.      Current Facility-Administered Medications:   •  acetaminophen (TYLENOL) tablet 650 mg, 650 mg, Oral, Q4H PRN **OR** acetaminophen (TYLENOL) 160 MG/5ML solution 650 mg, 650 mg, Oral, Q4H PRN **OR** acetaminophen (TYLENOL) suppository 650 mg, 650 mg, Rectal, Q4H PRN, Tung Muir MD  •  citalopram (CeleXA) tablet 10 mg, 10 mg, Oral, Daily, Tugn Muir MD, 10 mg at 01/03/20 0947  •  hydroCHLOROthiazide (HYDRODIURIL) oral 12.5 mg, 12.5 mg, Oral, Daily, Tung Muir MD, 12.5 mg at 01/03/20 0947  •  lactated ringers infusion, 30 mL/hr, Intravenous, Continuous, Tung Muir MD, Stopped at 01/02/20 1028  •  ondansetron (ZOFRAN) injection 4 mg, 4 mg, Intravenous, Q6H PRN, Tung Muir MD  •  prednisoLONE acetate (PRED FORTE) 1 % ophthalmic suspension 1 drop, 1 drop, Left Eye, Daily, Tung Muir MD, 1 drop at 01/02/20 1043  •  sodium chloride 0.9 % flush 10 mL, 10 mL, Intravenous, PRN, Tung Muir MD, 10 mL at 01/01/20 0843  Review of Systems:    The following systems were reviewed and negative;  constitution and gastrointestinal    Objective     Vital Signs  Temp:  [97.5 °F (36.4 °C)-97.7 °F (36.5 °C)] 97.5 °F (36.4 °C)  Heart Rate:  [70-90] 85  Resp:  [12-14] 14  BP: (119-163)/(55-74) 119/65  Body mass index is 22.89 kg/m².    Intake/Output Summary (Last 24 hours) at 1/3/2020 0947  Last data filed at 1/3/2020 0900  Gross per 24 hour   Intake 1020 ml   Output --   Net 1020 ml     I/O this shift:  In: 240 [P.O.:240]  Out: -      Physical Exam:   General: patient awake, alert and cooperative   Abdomen: soft, nontender, nondistended;  normal bowel sounds   Rectal: deferred   Extremities: no rash or edema   Psychiatric: Normal mood and behavior; memory intact     Results Review:     I reviewed the patient's new clinical results.    Results from last 7 days   Lab Units 01/03/20 0317 01/02/20  0535 01/01/20  1610  12/31/19  0759   WBC 10*3/mm3 3.63 3.93  --   --  3.24*   HEMOGLOBIN g/dL 8.9* 9.9* 9.5*   < > 9.7*  9.7*   HEMATOCRIT % 26.5* 28.8* 28.5*   < > 28.3*  28.3*   PLATELETS 10*3/mm3 242 270  --   --  231    < > = values in this interval not displayed.     Results from last 7 days   Lab Units 01/03/20 0317 01/02/20  0535 12/31/19  0759 12/30/19  1247   SODIUM mmol/L 132* 137 137 133*   POTASSIUM mmol/L 3.8 4.0 4.0 4.4   CHLORIDE mmol/L 96* 100 101 95*   CO2 mmol/L 25.4 26.2 24.7 26.8   BUN mg/dL 7* 12 16 14   CREATININE mg/dL 0.56* 0.62 0.54* 0.71   CALCIUM mg/dL 8.6 9.2 8.8 9.1   BILIRUBIN mg/dL  --   --   --  0.2   ALK PHOS U/L  --   --   --  86   ALT (SGPT) U/L  --   --   --  8   AST (SGOT) U/L  --   --   --  19   GLUCOSE mg/dL 110* 105* 98 99         No results found for: LIPASE    Assessment/Plan   Assessment:   1.  GI bleeding - suspected lower source, likely diverticular.  Negative EGD and incomplete colonoscopy this admission  2.  Acute blood loss anemia - Hb has dropped 1GM o/n but no e/o active rebleeding  3.  Hyponatremia     Plan:   I would consider monitoring in hospital for an additional 24hrs to ensure stabilization of Hb  I would consider course of IV iron infusion  Monitor Na levels  I discussed option of performing barium enema yesterday with patient's daughter but we both agree to defer for time being.         I discussed the patients findings and my recommendations with patient.         Tung Muir M.D.  [facility]  [address] [place] [address]  Office: [phone]

## 2020-01-03 NOTE — PROGRESS NOTES
"DAILY PROGRESS NOTE  [facility]    Patient Identification:  Name: Cristela Peralta  Age: 89 y.o.  Sex: female  :  1928  MRN: [id number]         Primary Care Physician: Arelis Lara MD    Subjective: patient is sitting up; egd was done earlier; she is visiting with family ;feels well  Interval History: follow up for acute blood loss anemia, gi bleed, hypertension, diastolic chf  diverticulosis  Objective:    Scheduled Meds:  citalopram 10 mg Oral Daily   hydroCHLOROthiazide 12.5 mg Oral Daily   prednisoLONE acetate 1 drop Left Eye Daily     Continuous Infusions:  lactated ringers 30 mL/hr Last Rate: Stopped (20 1028)       Vital signs in last 24 hours:  Temp:  [97.2 °F (36.2 °C)-97.7 °F (36.5 °C)] 97.7 °F (36.5 °C)  Heart Rate:  [70-90] 89  Resp:  [10-16] 14  BP: (127-163)/(55-77) 131/55    Intake/Output:    Intake/Output Summary (Last 24 hours) at 2020 2216  Last data filed at 2020 1838  Gross per 24 hour   Intake 540 ml   Output --   Net 540 ml       Exam:  /55 (BP Location: Left arm, Patient Position: Lying)   Pulse 89   Temp 97.7 °F (36.5 °C) (Oral)   Resp 14   Ht 167.6 cm (66\")   Wt 64.3 kg (141 lb 12.8 oz)   SpO2 96%   BMI 22.89 kg/m²     General Appearance:    Alert, cooperative, no distress, AAOx3                          Head:    Normocephalic, without obvious abnormality, atraumatic                           Eyes:    PERRL, conjunctiva/corneas clear, EOM's intact, both eyes                         Throat:   Lips, tongue, gums normal; oral mucosa pink and moist                           Neck:   Supple, symmetrical, trachea midline, no JVD                         Lungs:    Decreased breath sounds bilaterally, respirations unlabored                 Chest Wall:    No tenderness or deformity                          Heart:    Regular rate and rhythm, S1 and S2 normal, no murmur,no  Rub                                      or gallop                  Abdomen: "     Soft, non-tender, bowel sounds active, no masses, no                                                        organomegaly                  Extremities:   Extremities normal, atraumatic, no cyanosis or edema                        Pulses:   Pulses palpable in all extremities                            Skin:   Skin is warm and dry,  no rashes or palpable lesions                  Neurologic:   CNII-XII intact, motor strength grossly intact, sensation grossly                                         intact to light touch, no focal deficits noted       Data Review:  Labs in chart were reviewed.  WBC   Date Value Ref Range Status   01/02/2020 3.93 3.40 - 10.80 10*3/mm3 Final     Hemoglobin   Date Value Ref Range Status   01/02/2020 9.9 (L) 12.0 - 15.9 g/dL Final     Hematocrit   Date Value Ref Range Status   01/02/2020 28.8 (L) 34.0 - 46.6 % Final     Platelets   Date Value Ref Range Status   01/02/2020 270 140 - 450 10*3/mm3 Final     Sodium   Date Value Ref Range Status   01/02/2020 137 136 - 145 mmol/L Final     Potassium   Date Value Ref Range Status   01/02/2020 4.0 3.5 - 5.2 mmol/L Final     Chloride   Date Value Ref Range Status   01/02/2020 100 98 - 107 mmol/L Final     CO2   Date Value Ref Range Status   01/02/2020 26.2 22.0 - 29.0 mmol/L Final     BUN   Date Value Ref Range Status   01/02/2020 12 8 - 23 mg/dL Final     Creatinine   Date Value Ref Range Status   01/02/2020 0.62 0.57 - 1.00 mg/dL Final     Glucose   Date Value Ref Range Status   01/02/2020 105 (H) 65 - 99 mg/dL Final     Calcium   Date Value Ref Range Status   01/02/2020 9.2 8.2 - 9.6 mg/dL Final     No results found for: AST, ALT, ALKPHOS  No results found for: APTT, INR  Patient Active Problem List   Diagnosis Code   • Diastolic dysfunction I51.89   • Hyperlipidemia E78.5   • Hypertension I10   • Chronic low back pain M54.5, G89.29   • Osteoarthritis M19.90   • Osteopenia M85.80   • Vitamin D deficiency E55.9   • Mild single current episode of  major depressive disorder (CMS/HCC) F32.0   • PVD (peripheral vascular disease) (CMS/HCC) I73.9   • Gastrointestinal hemorrhage with melena K92.1   • Acute posthemorrhagic anemia D62   • Lower GI bleed K92.2       Assessment:    Gastrointestinal hemorrhage with melena    Diastolic dysfunction    Hypertension    PVD (peripheral vascular disease) (CMS/HCC)    Acute posthemorrhagic anemia    Lower GI bleed  diverticulosis    Plan:  One loop of bowel could not be evaluated per gi note  Considering a barium enema  Bleeding appears to have stopped  Trend hgb  Blood pressure is stable  Volume is compensated  Medium risk      Ruma Day MD  1/2/2020  10:16 PM

## 2020-01-03 NOTE — NURSING NOTE
Call placed to Dr. Muir last note mentioned giving IV iron infusion, order not yet placed. Awaiting return phone call

## 2020-01-03 NOTE — PROGRESS NOTES
Name: Cristela Peralta ADMIT: 2019   : 1928  PCP: Arelis Lara MD    MRN: [id number] LOS: 3 days   AGE/SEX: 89 y.o. female  ROOM: [facility]     Subjective   Subjective     Patient is lying on the bed.  Denies nausea, vomiting abdominal pain, chest pain.  She denies any melena or bright red blood per rectum.    Review of Systems     Objective   Objective   Vital Signs  Temp:  [97.5 °F (36.4 °C)-97.9 °F (36.6 °C)] 97.9 °F (36.6 °C)  Heart Rate:  [85-92] 90  Resp:  [14] 14  BP: (112-133)/(55-76) 112/68  SpO2:  [96 %-99 %] 96 %  on  Flow (L/min):  [2] 2;   Device (Oxygen Therapy): room air  Body mass index is 22.89 kg/m².  Physical Exam   Constitutional: She is oriented to person, place, and time. She appears well-developed and well-nourished.   HENT:   Head: Atraumatic.   Nose: Nose normal.   Mouth/Throat: Oropharynx is clear and moist.   Eyes: Pupils are equal, round, and reactive to light. Conjunctivae and EOM are normal.   Neck: Normal range of motion. Neck supple.   Cardiovascular: Regular rhythm and normal heart sounds.   Pulmonary/Chest: Effort normal and breath sounds normal. She has no wheezes.   Abdominal: Soft. Bowel sounds are normal. She exhibits no distension.   Musculoskeletal: She exhibits no edema or deformity.   Neurological: She is alert and oriented to person, place, and time.   Skin: Skin is warm.   Psychiatric: She has a normal mood and affect. Her behavior is normal.       Results Review:       I reviewed the patient's new clinical results.  Results from last 7 days   Lab Units 20  0317 20  0535 20  1610 20  0754  19  0759  19  1247   WBC 10*3/mm3 3.63 3.93  --   --   --  3.24*  --  4.07   HEMOGLOBIN g/dL 8.9* 9.9* 9.5* 9.4*   < > 9.7*  9.7*   < > 10.4*   PLATELETS 10*3/mm3 242 270  --   --   --  231  --  264    < > = values in this interval not displayed.     Results from last 7 days   Lab Units 20  0317 20  0535 19  0759  12/30/19  1247   SODIUM mmol/L 132* 137 137 133*   POTASSIUM mmol/L 3.8 4.0 4.0 4.4   CHLORIDE mmol/L 96* 100 101 95*   CO2 mmol/L 25.4 26.2 24.7 26.8   BUN mg/dL 7* 12 16 14   CREATININE mg/dL 0.56* 0.62 0.54* 0.71   GLUCOSE mg/dL 110* 105* 98 99   Estimated Creatinine Clearance: 46.5 mL/min (A) (by C-G formula based on SCr of 0.56 mg/dL (L)).  Results from last 7 days   Lab Units 12/30/19  1247   ALBUMIN g/dL 3.80   BILIRUBIN mg/dL 0.2   ALK PHOS U/L 86   AST (SGOT) U/L 19   ALT (SGPT) U/L 8     Results from last 7 days   Lab Units 01/03/20  0317 01/02/20  0535 12/31/19  0759 12/30/19  1247   CALCIUM mg/dL 8.6 9.2 8.8 9.1   ALBUMIN g/dL  --   --   --  3.80   MAGNESIUM mg/dL 2.0  --   --   --        No results found for: HGBA1C, POCGLU      citalopram 10 mg Oral Daily   hydroCHLOROthiazide 12.5 mg Oral Daily   prednisoLONE acetate 1 drop Left Eye Daily       lactated ringers 30 mL/hr Last Rate: Stopped (01/02/20 1028)   Diet Regular       Assessment/Plan     Active Hospital Problems    Diagnosis  POA   • **Gastrointestinal hemorrhage with melena [K92.1]  Yes   • Acute posthemorrhagic anemia [D62]  Yes   • Lower GI bleed [K92.2]  Yes   • PVD (peripheral vascular disease) (CMS/HCC) [I73.9]  Yes   • Hypertension [I10]  Yes   • Diastolic dysfunction [I51.89]  Yes      Resolved Hospital Problems   No resolved problems to display.         1. Acute GI bleed, patient's EGD did not reveal any significant findings.  Colonoscopy was incomplete.  Barium enema was considered but deferred after discussing with the family members.  Hemoglobin dropped from 9.9-8.9 over the past 24 hours.  Recommended to monitor 1 more day and if H and H remained stable consider discharge in a.m.  2. Hypertension, on HCTZ which will be continued.  3. History of depression, on Celexa.    Adrien Cook MD  [facility]  01/03/20  3:39 PM

## 2020-01-03 NOTE — PLAN OF CARE
Problem: Patient Care Overview  Goal: Plan of Care Review  Outcome: Ongoing (interventions implemented as appropriate)  Flowsheets (Taken 1/3/2020 8563)  Plan of Care Reviewed With: patient  Outcome Summary: VSS. pt denies pain, pt had bowel movement stool noted light brown. no S/S of disttress or discomfort will continue to montior

## 2020-01-03 NOTE — PLAN OF CARE
Problem: Patient Care Overview  Goal: Plan of Care Review  Outcome: Ongoing (interventions implemented as appropriate)  Flowsheets (Taken 1/3/2020 6670)  Progress: improving  Plan of Care Reviewed With: patient  Outcome Summary: VSS. On 2L O2 while asleep. No complaints of pain during shift. Possible discharge today. Will continue to monitor.

## 2020-01-04 VITALS
TEMPERATURE: 98.1 F | SYSTOLIC BLOOD PRESSURE: 137 MMHG | HEIGHT: 66 IN | DIASTOLIC BLOOD PRESSURE: 65 MMHG | WEIGHT: 141.8 LBS | RESPIRATION RATE: 16 BRPM | OXYGEN SATURATION: 94 % | BODY MASS INDEX: 22.79 KG/M2 | HEART RATE: 85 BPM

## 2020-01-04 LAB
ANION GAP SERPL CALCULATED.3IONS-SCNC: 10.3 MMOL/L (ref 5–15)
BASOPHILS # BLD AUTO: 0.03 10*3/MM3 (ref 0–0.2)
BASOPHILS NFR BLD AUTO: 0.8 % (ref 0–1.5)
BUN BLD-MCNC: 7 MG/DL (ref 8–23)
BUN/CREAT SERPL: 12.3 (ref 7–25)
CALCIUM SPEC-SCNC: 8.7 MG/DL (ref 8.2–9.6)
CHLORIDE SERPL-SCNC: 97 MMOL/L (ref 98–107)
CO2 SERPL-SCNC: 26.7 MMOL/L (ref 22–29)
CREAT BLD-MCNC: 0.57 MG/DL (ref 0.57–1)
DEPRECATED RDW RBC AUTO: 42.7 FL (ref 37–54)
EOSINOPHIL # BLD AUTO: 0.2 10*3/MM3 (ref 0–0.4)
EOSINOPHIL NFR BLD AUTO: 5.4 % (ref 0.3–6.2)
ERYTHROCYTE [DISTWIDTH] IN BLOOD BY AUTOMATED COUNT: 12.4 % (ref 12.3–15.4)
GFR SERPL CREATININE-BSD FRML MDRD: 99 ML/MIN/1.73
GLUCOSE BLD-MCNC: 100 MG/DL (ref 65–99)
HCT VFR BLD AUTO: 26.4 % (ref 34–46.6)
HGB BLD-MCNC: 8.9 G/DL (ref 12–15.9)
IMM GRANULOCYTES # BLD AUTO: 0.01 10*3/MM3 (ref 0–0.05)
IMM GRANULOCYTES NFR BLD AUTO: 0.3 % (ref 0–0.5)
LYMPHOCYTES # BLD AUTO: 1.56 10*3/MM3 (ref 0.7–3.1)
LYMPHOCYTES NFR BLD AUTO: 41.8 % (ref 19.6–45.3)
MCH RBC QN AUTO: 31.8 PG (ref 26.6–33)
MCHC RBC AUTO-ENTMCNC: 33.7 G/DL (ref 31.5–35.7)
MCV RBC AUTO: 94.3 FL (ref 79–97)
MONOCYTES # BLD AUTO: 0.48 10*3/MM3 (ref 0.1–0.9)
MONOCYTES NFR BLD AUTO: 12.9 % (ref 5–12)
NEUTROPHILS # BLD AUTO: 1.45 10*3/MM3 (ref 1.7–7)
NEUTROPHILS NFR BLD AUTO: 38.8 % (ref 42.7–76)
NRBC BLD AUTO-RTO: 0 /100 WBC (ref 0–0.2)
PLATELET # BLD AUTO: 238 10*3/MM3 (ref 140–450)
PMV BLD AUTO: 9.7 FL (ref 6–12)
POTASSIUM BLD-SCNC: 4.1 MMOL/L (ref 3.5–5.2)
RBC # BLD AUTO: 2.8 10*6/MM3 (ref 3.77–5.28)
SODIUM BLD-SCNC: 134 MMOL/L (ref 136–145)
WBC NRBC COR # BLD: 3.73 10*3/MM3 (ref 3.4–10.8)

## 2020-01-04 PROCEDURE — 63710000001 CITALOPRAM 10 MG TABLET: Performed by: INTERNAL MEDICINE

## 2020-01-04 PROCEDURE — A9270 NON-COVERED ITEM OR SERVICE: HCPCS | Performed by: INTERNAL MEDICINE

## 2020-01-04 PROCEDURE — 80048 BASIC METABOLIC PNL TOTAL CA: CPT | Performed by: INTERNAL MEDICINE

## 2020-01-04 PROCEDURE — G0378 HOSPITAL OBSERVATION PER HR: HCPCS

## 2020-01-04 PROCEDURE — 63710000001 HYDROCHLOROTHIAZIDE 12.5 MG CAPSULE: Performed by: INTERNAL MEDICINE

## 2020-01-04 PROCEDURE — 99213 OFFICE O/P EST LOW 20 MIN: CPT | Performed by: INTERNAL MEDICINE

## 2020-01-04 PROCEDURE — 85025 COMPLETE CBC W/AUTO DIFF WBC: CPT | Performed by: INTERNAL MEDICINE

## 2020-01-04 RX ORDER — ACYCLOVIR 400 MG/1
400 TABLET ORAL 3 TIMES DAILY
Qty: 15 TABLET | Refills: 0 | Status: SHIPPED | OUTPATIENT
Start: 2020-01-04 | End: 2020-01-09

## 2020-01-04 RX ADMIN — HYDROCHLOROTHIAZIDE 12.5 MG: 12.5 CAPSULE ORAL at 08:42

## 2020-01-04 RX ADMIN — CITALOPRAM 10 MG: 10 TABLET, FILM COATED ORAL at 08:42

## 2020-01-04 RX ADMIN — PREDNISOLONE ACETATE 1 DROP: 10 SUSPENSION/ DROPS OPHTHALMIC at 08:42

## 2020-01-04 NOTE — DISCHARGE SUMMARY
"       NAME: Cristela Peralta ADMIT: 2019   : 1928  PCP: Arelis Lara MD    MRN: [id number] LOS: 3 days   AGE/SEX: 89 y.o. female  ROOM: E559/1     Date of Admission:  2019  Date of Discharge:  2020    PCP: Arelis Lara MD    CHIEF COMPLAINT  Black or Bloody Stool      DISCHARGE DIAGNOSIS  Active Hospital Problems    Diagnosis  POA   • **Gastrointestinal hemorrhage with melena [K92.1]  Yes   • Acute posthemorrhagic anemia [D62]  Yes   • Lower GI bleed [K92.2]  Yes   • PVD (peripheral vascular disease) (CMS/HCC) [I73.9]  Yes   • Hypertension [I10]  Yes   • Diastolic dysfunction [I51.89]  Yes      Resolved Hospital Problems   No resolved problems to display.       SECONDARY DIAGNOSES  Past Medical History:   Diagnosis Date   • Degeneration of cervical intervertebral disc    • H/O bone density study 2015   • History of depression    • History of diastolic dysfunction    • History of diverticulosis    • History of EKG 2015   • History of herpes genitalis    • History of mammogram 2015   • History of spinal stenosis    • History of vertigo    • Hyperlipidemia    • Hypertension    • Osteoarthritis    • Osteopenia    • Osteoporosis        CONSULTS       HOSPITAL COURSE  This is a 89-year-old female with a history of hypertension, arthritis, peripheral vascular disease who is on aspirin and Celebrex and presents to the emergency room with a 5-day history of blood in her stool.  She initially noticed small streaks of pink-tinged blood on the toilet paper and then developed clots with signs of \"old blood\".  She took herself off of the aspirin and Celebrex and her symptoms resolved for about a day and a half.  She then resumed the medications and this morning she began noticing old blood with clots again.  She denies much in the way of profuse bright red blood per rectum.  She also denies melanotic stools.  She has had no abdominal pain, nausea, or vomiting.  Hemoglobin is " down to 10.4 from 12.5 2 months ago.  She states that she has had prior colonoscopies but these have been very difficult due to a tortuous colon.  The last of which was attempted under general anesthesia by Dr. Betancourt but was unsuccessful.       1. Acute GI bleed, patient's EGD did not reveal any significant findings.  Colonoscopy was incomplete.  Barium enema was considered but deferred after discussing with the family members.  Hemoglobin remained stable at 8.9.  There is no evidence of any further bleeding / melena or bright red blood per rectum.  Patient remains hemodynamically stable.  GI did recommend that patient be discharged home today and follow up as an outpatient basis.  2. Hypertension, on HCTZ which will be continued.  3. History of depression, on Celexa.  4. Herpes, patient will be placed on acyclovir for 5 days.      Please note patient was seen examined today on day of discharge.  Time taken to discharge 40 minutes.            CONDITION ON DISCHARGE  Stable.      DISCHARGE DISPOSITION   Home or Self Care      DISCHARGE MEDICATIONS       Your medication list      START taking these medications      Instructions Last Dose Given Next Dose Due   acyclovir 400 MG tablet  Commonly known as:  ZOVIRAX      Take 1 tablet by mouth 3 (Three) Times a Day for 5 days. Take no more than 5 doses a day.          CONTINUE taking these medications      Instructions Last Dose Given Next Dose Due   aspirin 81 MG tablet      Take 81 mg by mouth Daily.       celecoxib 200 MG capsule  Commonly known as:  CeleBREX      Take 200 mg by mouth Daily.       CENTRUM SILVER tablet      Take 1 tablet by mouth Daily.       cetirizine 10 MG tablet  Commonly known as:  zyrTEC      Take 1 tablet by mouth Daily.       citalopram 10 MG tablet  Commonly known as:  CeleXA      Take 1 tablet by mouth Daily.       FISH OIL TRIPLE STRENGTH 1400 MG capsule      Take 1 capsule by mouth Daily.       fluticasone 50 MCG/ACT nasal  spray  Commonly known as:  FLONASE      2 sprays into the nostril(s) as directed by provider Daily for 30 days.       hydroCHLOROthiazide 12.5 MG tablet  Commonly known as:  HYDRODIURIL      Take 1 tablet by mouth Daily.       prednisoLONE acetate 1 % ophthalmic suspension  Commonly known as:  PRED FORTE      Administer 1 drop into the left eye Daily.       vitamin C 500 MG tablet  Commonly known as:  ASCORBIC ACID      Take 500 mg by mouth Daily.          STOP taking these medications    valACYclovir 500 MG tablet  Commonly known as:  VALTREX              Where to Get Your Medications      These medications were sent to [address] - [address] [place] [address] AT [facility] & [facility] - [phone]  - [phone] [place] [id number]    Phone:  [phone]   · acyclovir 400 MG tablet          Future Appointments   Date Time Provider Department Center   10/30/2020 10:00 AM [facility] None   11/6/2020 10:00 AM Arelis Lara MD [facility] None     Follow-up Information     Arelis Lara MD .    Specialty:  Internal Medicine  Contact information:  [address] [place] [id number] [phone]                   TEST  RESULTS PENDING AT DISCHARGE         Adrien Cook MD  [facility]  01/04/20  2:27 PM

## 2020-01-04 NOTE — PROGRESS NOTES
[facility], [place], operated by [facility]     Inpatient Follow Up Note    Patient Identification:  Name: Cristela Peralta  Age: 89 y.o.  Sex: female  :  1928  MRN: [id number]    Information from:patient     CC: Lower intestinal bleeding.    History:   No further bleeding.  Patient tolerating solid diet.  No abdominal pain.  Has tolerated her evaluations well.  Globin appears to be stable.    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 (CMS/[facility]) [F32.0]   • Vitamin D deficiency [E55.9]   • Diastolic dysfunction [I51.89]   • Hyperlipidemia [E78.5]   • Hypertension [I10]   • Chronic low back pain [M54.5, G89.29]   • Osteoarthritis [M19.90]   • Osteopenia [M85.80]     Current Meds:  MAR Reviewed  Scheduled Meds:  citalopram 10 mg Oral Daily   hydroCHLOROthiazide 12.5 mg Oral Daily   prednisoLONE acetate 1 drop Left Eye Daily     Continuous Infusions:  lactated ringers 30 mL/hr Last Rate: Stopped (20 1028)     PRN Meds:.•  acetaminophen **OR** acetaminophen **OR** acetaminophen  •  ondansetron  •  sodium chloride  Allergies:  Allergies   Allergen Reactions   • Neomycin-Bacitracin Zn-Polymyx Rash   • Atorvastatin    • Pravastatin    • Sulfa Antibiotics    • Alendronate GI Intolerance       Intake/Output:     Intake/Output Summary (Last 24 hours) at 2020 1333  Last data filed at 2020 0920  Gross per 24 hour   Intake 970 ml   Output --   Net 970 ml     New allergies/reactions:  None    Physical Exam:  Vitals:   Temp (24hrs), Av °F (36.7 °C), Min:97.9 °F (36.6 °C), Max:98.1 °F (36.7 °C)    Temp:  [97.9 °F (36.6 °C)-98.1 °F (36.7 °C)] 98.1 °F  "(36.7 °C)  Heart Rate:  [] 85  Resp:  [14-16] 16  BP: (112-140)/(60-72) 137/65  /65 (BP Location: Left arm, Patient Position: Lying)   Pulse 85   Temp 98.1 °F (36.7 °C) (Oral)   Resp 16   Ht 167.6 cm (66\")   Wt 64.3 kg (141 lb 12.8 oz)   SpO2 94%   BMI 22.89 kg/m²     Exam:  NAD  PERRLA. Sclerae and conjunctivae normal  HENT: external inspection normal. Hearing intact.  No respiratory distress.  Abdomen: bowel sounds active. Soft, non-tender, no HSM.  Alert, oriented, normal affect.         DATA:  Radiology and Labs:   Recent Results (from the past 24 hour(s))   Basic Metabolic Panel    Collection Time: 01/04/20  5:18 AM   Result Value Ref Range    Glucose 100 (H) 65 - 99 mg/dL    BUN 7 (L) 8 - 23 mg/dL    Creatinine 0.57 0.57 - 1.00 mg/dL    Sodium 134 (L) 136 - 145 mmol/L    Potassium 4.1 3.5 - 5.2 mmol/L    Chloride 97 (L) 98 - 107 mmol/L    CO2 26.7 22.0 - 29.0 mmol/L    Calcium 8.7 8.2 - 9.6 mg/dL    eGFR Non African Amer 99 >60 mL/min/1.73    BUN/Creatinine Ratio 12.3 7.0 - 25.0    Anion Gap 10.3 5.0 - 15.0 mmol/L   CBC Auto Differential    Collection Time: 01/04/20  5:18 AM   Result Value Ref Range    WBC 3.73 3.40 - 10.80 10*3/mm3    RBC 2.80 (L) 3.77 - 5.28 10*6/mm3    Hemoglobin 8.9 (L) 12.0 - 15.9 g/dL    Hematocrit 26.4 (L) 34.0 - 46.6 %    MCV 94.3 79.0 - 97.0 fL    MCH 31.8 26.6 - 33.0 pg    MCHC 33.7 31.5 - 35.7 g/dL    RDW 12.4 12.3 - 15.4 %    RDW-SD 42.7 37.0 - 54.0 fl    MPV 9.7 6.0 - 12.0 fL    Platelets 238 140 - 450 10*3/mm3    Neutrophil % 38.8 (L) 42.7 - 76.0 %    Lymphocyte % 41.8 19.6 - 45.3 %    Monocyte % 12.9 (H) 5.0 - 12.0 %    Eosinophil % 5.4 0.3 - 6.2 %    Basophil % 0.8 0.0 - 1.5 %    Immature Grans % 0.3 0.0 - 0.5 %    Neutrophils, Absolute 1.45 (L) 1.70 - 7.00 10*3/mm3    Lymphocytes, Absolute 1.56 0.70 - 3.10 10*3/mm3    Monocytes, Absolute 0.48 0.10 - 0.90 10*3/mm3    Eosinophils, Absolute 0.20 0.00 - 0.40 10*3/mm3    Basophils, Absolute 0.03 0.00 - 0.20 " 10*3/mm3    Immature Grans, Absolute 0.01 0.00 - 0.05 10*3/mm3    nRBC 0.0 0.0 - 0.2 /100 WBC       Assessment:   Problem List:     Gastrointestinal hemorrhage with melena    Diastolic dysfunction    Hypertension    PVD (peripheral vascular disease) (CMS/HCC)    Acute posthemorrhagic anemia    Lower GI bleed    This is a suspected diverticular bleed.  It appears that she is not having any active bleeding at this time.    Plan:   It looks like she is nearing the end of her hospital benefit.  Defer decision regarding iron therapy to others, although I am a bit worried she would have GI side effects with oral iron.      Chi Batista MD  [facility]  1/4/2020  \

## 2020-01-04 NOTE — PLAN OF CARE
Problem: Patient Care Overview  Goal: Plan of Care Review  Outcome: Ongoing (interventions implemented as appropriate)  Flowsheets (Taken 1/4/2020 7145)  Progress: improving  Plan of Care Reviewed With: patient  Outcome Summary: VSS.  No c/o pain.  Watching hgb to see if stable to dc home.   No signs of bleeding noted.  Will continue to monitor.  Goal: Individualization and Mutuality  Outcome: Ongoing (interventions implemented as appropriate)  Goal: Discharge Needs Assessment  Outcome: Ongoing (interventions implemented as appropriate)  Goal: Interprofessional Rounds/Family Conf  Outcome: Ongoing (interventions implemented as appropriate)     Problem: Fall Risk (Adult)  Goal: Absence of Fall  Outcome: Ongoing (interventions implemented as appropriate)     Problem: Gastrointestinal Bleeding (Adult)  Goal: Signs and Symptoms of Listed Potential Problems Will be Absent, Minimized or Managed (Gastrointestinal Bleeding)  Outcome: Ongoing (interventions implemented as appropriate)

## 2020-01-06 NOTE — PROGRESS NOTES
Case Management Discharge Note      Final Note: Pt discharged home, no known needs.  VANESSA Mckinnon RN         Destination      No service has been selected for the patient.      Durable Medical Equipment      No service has been selected for the patient.      Dialysis/Infusion      No service has been selected for the patient.      Home Medical Care      No service has been selected for the patient.      Therapy      No service has been selected for the patient.      Community Resources      No service has been selected for the patient.        Transportation Services  Private: Car    Final Discharge Disposition Code: 01 - home or self-care

## 2020-01-13 ENCOUNTER — OFFICE VISIT (OUTPATIENT)
Dept: INTERNAL MEDICINE | Facility: CLINIC | Age: 85
End: 2020-01-13

## 2020-01-13 VITALS
SYSTOLIC BLOOD PRESSURE: 106 MMHG | HEIGHT: 66 IN | HEART RATE: 81 BPM | BODY MASS INDEX: 22.5 KG/M2 | WEIGHT: 140 LBS | DIASTOLIC BLOOD PRESSURE: 70 MMHG | OXYGEN SATURATION: 97 %

## 2020-01-13 DIAGNOSIS — D62 ACUTE POSTHEMORRHAGIC ANEMIA: ICD-10-CM

## 2020-01-13 DIAGNOSIS — K92.2 LOWER GI BLEED: Primary | ICD-10-CM

## 2020-01-13 PROCEDURE — 99213 OFFICE O/P EST LOW 20 MIN: CPT | Performed by: INTERNAL MEDICINE

## 2020-01-13 RX ORDER — ACYCLOVIR 400 MG/1
TABLET ORAL
COMMUNITY
Start: 2020-01-10 | End: 2020-01-13 | Stop reason: SDUPTHER

## 2020-01-13 RX ORDER — ACYCLOVIR 400 MG/1
400 TABLET ORAL 3 TIMES DAILY PRN
Qty: 90 TABLET | Refills: 0 | Status: SHIPPED | OUTPATIENT
Start: 2020-01-13 | End: 2020-06-02

## 2020-01-13 NOTE — PROGRESS NOTES
Subjective     Cristela Peralta is a 89 y.o. female who presents with   Chief Complaint   Patient presents with   • Black or Bloody Stool     Hospital Follow up       History of Present Illness     Patient presents in hospital f/u.  She was admitted for lower GI bleed.  I have reviewed discharge summary, labs, scans, cardiovascular studies and medication changes.  Source was not found.  She is off aspirin and celebrex.  At discharge she was still anemic.        Review of Systems   Respiratory: Negative.    Cardiovascular: Negative.        The following portions of the patient's history were reviewed and updated as appropriate: allergies, current medications and problem list.    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           Current Outpatient Medications on File Prior to Visit   Medication Sig Dispense Refill   • cetirizine (zyrTEC) 10 MG tablet Take 1 tablet by mouth Daily.     • citalopram (CeleXA) 10 MG tablet Take 1 tablet by mouth Daily. 90 tablet 3   • hydroCHLOROthiazide (HYDRODIURIL) 12.5 MG tablet Take 1 tablet by mouth Daily. 90 tablet 3   • Multiple Vitamins-Minerals (CENTRUM SILVER) tablet Take 1  "tablet by mouth Daily.     • Omega-3 Fatty Acids (FISH OIL TRIPLE STRENGTH) 1400 MG capsule Take 1 capsule by mouth Daily.     • prednisoLONE acetate (PRED FORTE) 1 % ophthalmic suspension Administer 1 drop into the left eye Daily.     • vitamin C (ASCORBIC ACID) 500 MG tablet Take 500 mg by mouth Daily.     • [DISCONTINUED] acyclovir (ZOVIRAX) 400 MG tablet      • [DISCONTINUED] aspirin 81 MG tablet Take 81 mg by mouth Daily.     • [DISCONTINUED] celecoxib (CeleBREX) 200 MG capsule Take 200 mg by mouth Daily.       No current facility-administered medications on file prior to visit.        Objective     /70   Pulse 81   Ht 167.6 cm (65.98\")   Wt 63.5 kg (140 lb)   SpO2 97%   BMI 22.61 kg/m²     Physical Exam   Constitutional: She is oriented to person, place, and time. She appears well-developed and well-nourished.   HENT:   Head: Normocephalic and atraumatic.   Pulmonary/Chest: Effort normal.   Neurological: She is alert and oriented to person, place, and time.   Psychiatric: She has a normal mood and affect. Her behavior is normal.       Assessment/Plan   [place] was seen today for black or bloody stool.    Diagnoses and all orders for this visit:    Lower GI bleed  -     CBC & Differential  -     Comprehensive Metabolic Panel    Acute posthemorrhagic anemia  -     CBC & Differential  -     Comprehensive Metabolic Panel    Other orders  -     acyclovir (ZOVIRAX) 400 MG tablet; Take 1 tablet by mouth 3 (Three) Times a Day As Needed (outbreak).        Discussion    Patient seen in f/u of anemia from recent lower GI bleed possibly diverticular.  She is off aspirin and celebrex.  She will remain off of it.  Check labs today to follow anemia.      Current outpatient and discharge medications have been reconciled for the patient.  Reviewed by: Arelis Lara MD         Future Appointments   Date Time Provider Department Center   10/30/2020 10:00 AM [facility] None   11/6/2020 10:00 " Arelis Gomes MD [facility] None

## 2020-01-14 LAB
ALBUMIN SERPL-MCNC: 4.2 G/DL (ref 3.5–5.2)
ALBUMIN/GLOB SERPL: 1.7 G/DL
ALP SERPL-CCNC: 87 U/L (ref 39–117)
ALT SERPL-CCNC: 8 U/L (ref 1–33)
AST SERPL-CCNC: 16 U/L (ref 1–32)
BASOPHILS # BLD AUTO: 0.02 10*3/MM3 (ref 0–0.2)
BASOPHILS NFR BLD AUTO: 0.5 % (ref 0–1.5)
BILIRUB SERPL-MCNC: 0.3 MG/DL (ref 0.2–1.2)
BUN SERPL-MCNC: 9 MG/DL (ref 8–23)
BUN/CREAT SERPL: 15 (ref 7–25)
CALCIUM SERPL-MCNC: 9.5 MG/DL (ref 8.2–9.6)
CHLORIDE SERPL-SCNC: 93 MMOL/L (ref 98–107)
CO2 SERPL-SCNC: 27.9 MMOL/L (ref 22–29)
CREAT SERPL-MCNC: 0.6 MG/DL (ref 0.57–1)
EOSINOPHIL # BLD AUTO: 0.08 10*3/MM3 (ref 0–0.4)
EOSINOPHIL NFR BLD AUTO: 2 % (ref 0.3–6.2)
ERYTHROCYTE [DISTWIDTH] IN BLOOD BY AUTOMATED COUNT: 12.9 % (ref 12.3–15.4)
GLOBULIN SER CALC-MCNC: 2.5 GM/DL
GLUCOSE SERPL-MCNC: 105 MG/DL (ref 65–99)
HCT VFR BLD AUTO: 29.9 % (ref 34–46.6)
HGB BLD-MCNC: 10.1 G/DL (ref 12–15.9)
IMM GRANULOCYTES # BLD AUTO: 0.01 10*3/MM3 (ref 0–0.05)
IMM GRANULOCYTES NFR BLD AUTO: 0.2 % (ref 0–0.5)
LYMPHOCYTES # BLD AUTO: 1.21 10*3/MM3 (ref 0.7–3.1)
LYMPHOCYTES NFR BLD AUTO: 30 % (ref 19.6–45.3)
MCH RBC QN AUTO: 33 PG (ref 26.6–33)
MCHC RBC AUTO-ENTMCNC: 33.8 G/DL (ref 31.5–35.7)
MCV RBC AUTO: 97.7 FL (ref 79–97)
MONOCYTES # BLD AUTO: 0.38 10*3/MM3 (ref 0.1–0.9)
MONOCYTES NFR BLD AUTO: 9.4 % (ref 5–12)
NEUTROPHILS # BLD AUTO: 2.34 10*3/MM3 (ref 1.7–7)
NEUTROPHILS NFR BLD AUTO: 57.9 % (ref 42.7–76)
NRBC BLD AUTO-RTO: 0 /100 WBC (ref 0–0.2)
PLATELET # BLD AUTO: 279 10*3/MM3 (ref 140–450)
POTASSIUM SERPL-SCNC: 4 MMOL/L (ref 3.5–5.2)
PROT SERPL-MCNC: 6.7 G/DL (ref 6–8.5)
RBC # BLD AUTO: 3.06 10*6/MM3 (ref 3.77–5.28)
SODIUM SERPL-SCNC: 132 MMOL/L (ref 136–145)
WBC # BLD AUTO: 4.04 10*3/MM3 (ref 3.4–10.8)

## 2020-02-12 ENCOUNTER — APPOINTMENT (OUTPATIENT)
Dept: WOMENS IMAGING | Facility: HOSPITAL | Age: 85
End: 2020-02-12

## 2020-02-12 PROCEDURE — 77067 SCR MAMMO BI INCL CAD: CPT | Performed by: RADIOLOGY

## 2020-02-12 PROCEDURE — 77063 BREAST TOMOSYNTHESIS BI: CPT | Performed by: RADIOLOGY

## 2020-05-13 RX ORDER — CELECOXIB 200 MG/1
CAPSULE ORAL
Qty: 90 CAPSULE | Refills: 3 | Status: SHIPPED | OUTPATIENT
Start: 2020-05-13 | End: 2020-12-02 | Stop reason: SDUPTHER

## 2020-06-02 RX ORDER — ACYCLOVIR 400 MG/1
TABLET ORAL
Qty: 90 TABLET | Refills: 0 | Status: SHIPPED | OUTPATIENT
Start: 2020-06-02 | End: 2020-06-04 | Stop reason: SDUPTHER

## 2020-06-04 RX ORDER — ACYCLOVIR 400 MG/1
400 TABLET ORAL 3 TIMES DAILY
Qty: 90 TABLET | Refills: 0 | Status: SHIPPED | OUTPATIENT
Start: 2020-06-04 | End: 2020-06-08 | Stop reason: SDUPTHER

## 2020-06-04 NOTE — TELEPHONE ENCOUNTER
PT CALLED REQUESTING A REFILL FOR acyclovir (ZOVIRAX) 400 MG tablet    [facility] CONFIRMED     PT CALL BACK   [phone]

## 2020-06-08 RX ORDER — ACYCLOVIR 400 MG/1
400 TABLET ORAL 2 TIMES DAILY
Qty: 90 TABLET | Refills: 0 | Status: SHIPPED | OUTPATIENT
Start: 2020-06-08 | End: 2021-01-28

## 2020-10-29 DIAGNOSIS — E78.5 HYPERLIPIDEMIA, UNSPECIFIED HYPERLIPIDEMIA TYPE: Primary | ICD-10-CM

## 2020-10-29 DIAGNOSIS — I10 ESSENTIAL HYPERTENSION: ICD-10-CM

## 2020-10-29 DIAGNOSIS — E55.9 VITAMIN D DEFICIENCY: ICD-10-CM

## 2020-10-31 LAB
25(OH)D3+25(OH)D2 SERPL-MCNC: 43.2 NG/ML (ref 30–100)
ALBUMIN SERPL-MCNC: 4.4 G/DL (ref 3.5–5.2)
ALBUMIN/GLOB SERPL: 2.1 G/DL
ALP SERPL-CCNC: 111 U/L (ref 39–117)
ALT SERPL-CCNC: 9 U/L (ref 1–33)
APPEARANCE UR: CLEAR
AST SERPL-CCNC: 13 U/L (ref 1–32)
BACTERIA #/AREA URNS HPF: NORMAL /HPF
BASOPHILS # BLD AUTO: 0.04 10*3/MM3 (ref 0–0.2)
BASOPHILS NFR BLD AUTO: 1.2 % (ref 0–1.5)
BILIRUB SERPL-MCNC: 0.5 MG/DL (ref 0–1.2)
BILIRUB UR QL STRIP: NEGATIVE
BUN SERPL-MCNC: 12 MG/DL (ref 8–23)
BUN/CREAT SERPL: 17.4 (ref 7–25)
CALCIUM SERPL-MCNC: 9.3 MG/DL (ref 8.2–9.6)
CASTS URNS MICRO: NORMAL
CHLORIDE SERPL-SCNC: 98 MMOL/L (ref 98–107)
CHOLEST SERPL-MCNC: 221 MG/DL (ref 0–200)
CO2 SERPL-SCNC: 30.4 MMOL/L (ref 22–29)
COLOR UR: YELLOW
CREAT SERPL-MCNC: 0.69 MG/DL (ref 0.57–1)
EOSINOPHIL # BLD AUTO: 0.08 10*3/MM3 (ref 0–0.4)
EOSINOPHIL NFR BLD AUTO: 2.3 % (ref 0.3–6.2)
EPI CELLS #/AREA URNS HPF: NORMAL /HPF
ERYTHROCYTE [DISTWIDTH] IN BLOOD BY AUTOMATED COUNT: 12.5 % (ref 12.3–15.4)
GLOBULIN SER CALC-MCNC: 2.1 GM/DL
GLUCOSE SERPL-MCNC: 93 MG/DL (ref 65–99)
GLUCOSE UR QL: NEGATIVE
HCT VFR BLD AUTO: 38.4 % (ref 34–46.6)
HDLC SERPL-MCNC: 57 MG/DL (ref 40–60)
HGB BLD-MCNC: 12.9 G/DL (ref 12–15.9)
HGB UR QL STRIP: NEGATIVE
IMM GRANULOCYTES # BLD AUTO: 0.01 10*3/MM3 (ref 0–0.05)
IMM GRANULOCYTES NFR BLD AUTO: 0.3 % (ref 0–0.5)
KETONES UR QL STRIP: NEGATIVE
LDLC SERPL CALC-MCNC: 140 MG/DL (ref 0–100)
LEUKOCYTE ESTERASE UR QL STRIP: NEGATIVE
LYMPHOCYTES # BLD AUTO: 0.99 10*3/MM3 (ref 0.7–3.1)
LYMPHOCYTES NFR BLD AUTO: 28.7 % (ref 19.6–45.3)
MCH RBC QN AUTO: 31.9 PG (ref 26.6–33)
MCHC RBC AUTO-ENTMCNC: 33.6 G/DL (ref 31.5–35.7)
MCV RBC AUTO: 95 FL (ref 79–97)
MONOCYTES # BLD AUTO: 0.37 10*3/MM3 (ref 0.1–0.9)
MONOCYTES NFR BLD AUTO: 10.7 % (ref 5–12)
NEUTROPHILS # BLD AUTO: 1.96 10*3/MM3 (ref 1.7–7)
NEUTROPHILS NFR BLD AUTO: 56.8 % (ref 42.7–76)
NITRITE UR QL STRIP: NEGATIVE
NRBC BLD AUTO-RTO: 0 /100 WBC (ref 0–0.2)
PH UR STRIP: 6.5 [PH] (ref 5–8)
PLATELET # BLD AUTO: 229 10*3/MM3 (ref 140–450)
POTASSIUM SERPL-SCNC: 4.7 MMOL/L (ref 3.5–5.2)
PROT SERPL-MCNC: 6.5 G/DL (ref 6–8.5)
PROT UR QL STRIP: NEGATIVE
RBC # BLD AUTO: 4.04 10*6/MM3 (ref 3.77–5.28)
RBC #/AREA URNS HPF: NORMAL /HPF
SODIUM SERPL-SCNC: 136 MMOL/L (ref 136–145)
SP GR UR: 1.02 (ref 1–1.03)
T4 FREE SERPL-MCNC: 1.14 NG/DL (ref 0.93–1.7)
TRIGL SERPL-MCNC: 136 MG/DL (ref 0–150)
TSH SERPL DL<=0.005 MIU/L-ACNC: 4.81 UIU/ML (ref 0.27–4.2)
UROBILINOGEN UR STRIP-MCNC: NORMAL MG/DL
VLDLC SERPL CALC-MCNC: 24 MG/DL (ref 5–40)
WBC # BLD AUTO: 3.45 10*3/MM3 (ref 3.4–10.8)
WBC #/AREA URNS HPF: NORMAL /HPF

## 2020-11-06 ENCOUNTER — OFFICE VISIT (OUTPATIENT)
Dept: INTERNAL MEDICINE | Facility: CLINIC | Age: 85
End: 2020-11-06

## 2020-11-06 VITALS
OXYGEN SATURATION: 98 % | BODY MASS INDEX: 22.5 KG/M2 | WEIGHT: 140 LBS | SYSTOLIC BLOOD PRESSURE: 140 MMHG | HEART RATE: 81 BPM | HEIGHT: 66 IN | DIASTOLIC BLOOD PRESSURE: 82 MMHG

## 2020-11-06 DIAGNOSIS — I73.9 PVD (PERIPHERAL VASCULAR DISEASE) (HCC): ICD-10-CM

## 2020-11-06 DIAGNOSIS — I10 ESSENTIAL HYPERTENSION: ICD-10-CM

## 2020-11-06 DIAGNOSIS — Z00.00 WELL ADULT EXAM: ICD-10-CM

## 2020-11-06 DIAGNOSIS — Z00.00 MEDICARE ANNUAL WELLNESS VISIT, SUBSEQUENT: Primary | ICD-10-CM

## 2020-11-06 DIAGNOSIS — F32.0 MILD SINGLE CURRENT EPISODE OF MAJOR DEPRESSIVE DISORDER (HCC): ICD-10-CM

## 2020-11-06 DIAGNOSIS — E78.5 HYPERLIPIDEMIA, UNSPECIFIED HYPERLIPIDEMIA TYPE: ICD-10-CM

## 2020-11-06 PROBLEM — D62 ACUTE POSTHEMORRHAGIC ANEMIA: Status: RESOLVED | Noted: 2020-01-01 | Resolved: 2020-11-06

## 2020-11-06 PROBLEM — K92.2 LOWER GI BLEED: Status: RESOLVED | Noted: 2019-12-30 | Resolved: 2020-11-06

## 2020-11-06 PROBLEM — K92.1 GASTROINTESTINAL HEMORRHAGE WITH MELENA: Status: RESOLVED | Noted: 2019-12-30 | Resolved: 2020-11-06

## 2020-11-06 PROCEDURE — G0439 PPPS, SUBSEQ VISIT: HCPCS | Performed by: INTERNAL MEDICINE

## 2020-11-06 PROCEDURE — 96160 PT-FOCUSED HLTH RISK ASSMT: CPT | Performed by: INTERNAL MEDICINE

## 2020-11-06 PROCEDURE — 99397 PER PM REEVAL EST PAT 65+ YR: CPT | Performed by: INTERNAL MEDICINE

## 2020-11-06 RX ORDER — HYDROCHLOROTHIAZIDE 12.5 MG/1
12.5 TABLET ORAL DAILY
Qty: 90 TABLET | Refills: 3 | Status: SHIPPED | OUTPATIENT
Start: 2020-11-06 | End: 2020-12-02 | Stop reason: SDUPTHER

## 2020-11-06 RX ORDER — CITALOPRAM 10 MG/1
10 TABLET ORAL DAILY
Qty: 90 TABLET | Refills: 3 | Status: SHIPPED | OUTPATIENT
Start: 2020-11-06 | End: 2020-12-02 | Stop reason: SDUPTHER

## 2020-11-06 NOTE — PROGRESS NOTES
Subjective     Cristela Peralta is a 89 y.o. female who presents for an annual wellness visit/cpe.     History of Present Illness     HTN. Control is good at home.  HLD. She is on dietary control only.   MDD. On Celexa and well-controlled.  Chronic LBP.  She is maintained on Celebrex with good control.   She has established PAD but is not on aspirin because of GI bleed history.      Review of Systems   Constitutional: Negative for fever.   Respiratory: Negative.    Cardiovascular: Negative.        The following portions of the patient's history were reviewed and updated as appropriate: allergies, current medications, past family history, past medical history, past social history, past surgical history and problem list.  Health maintenance tab was reviewed and updated with the patient.       Patient Active Problem List    Diagnosis Date Noted   • PVD (peripheral vascular disease) (CMS/[facility]) 06/24/2019     Note Last Updated: 6/24/2019     By humana screen     • Mild single current episode of major depressive disorder (CMS/[facility]) 10/24/2018   • Vitamin D deficiency 10/14/2016   • Diastolic dysfunction 05/13/2016   • Hyperlipidemia 05/13/2016   • Hypertension 05/13/2016   • Chronic low back pain 05/13/2016   • Osteoarthritis 05/13/2016   • Osteopenia 05/13/2016     Note Last Updated: 5/13/2016     S/p five years of bisphosphonates.           Past Medical History:   Diagnosis Date   • Degeneration of cervical intervertebral disc    • GI bleed    • H/O bone density study 12/11/2015   • History of depression    • History of diastolic dysfunction    • History of diverticulosis    • History of EKG 04/29/2015   • History of herpes genitalis    • History of mammogram 12/11/2015   • History of spinal stenosis    • History of vertigo    • Hyperlipidemia    • Hypertension    • Osteoarthritis    • Osteopenia    • Osteoporosis        Past Surgical History:   Procedure Laterality Date   • APPENDECTOMY     • COLONOSCOPY N/A 1/2/2020     Procedure: COLONOSCOPY;  Surgeon: Tung Muir MD;  Location: [facility] ENDOSCOPY;  Service: Gastroenterology   • ENDOSCOPY N/A 1/1/2020    Procedure: ESOPHAGOGASTRODUODENOSCOPY;  Surgeon: Yanet Obregon MD;  Location: [facility] ENDOSCOPY;  Service: Gastroenterology   • HYSTERECTOMY     • KNEE SURGERY      Replacement       Family History   Problem Relation Age of Onset   • Aneurysm Father    • Heart disease Father    • Colon cancer Sister    • Throat cancer Sister        Social History     Socioeconomic History   • Marital status:      Spouse name: Not on file   • Number of children: Not on file   • Years of education: Not on file   • Highest education level: Not on file   Tobacco Use   • Smoking status: Never Smoker   • Smokeless tobacco: Never Used   Substance and Sexual Activity   • Alcohol use: No       Current Outpatient Medications on File Prior to Visit   Medication Sig Dispense Refill   • celecoxib (CeleBREX) 200 MG capsule TAKE 1 CAPSULE EVERY DAY 90 capsule 3   • cetirizine (zyrTEC) 10 MG tablet Take 1 tablet by mouth Daily.     • Multiple Vitamins-Minerals (CENTRUM SILVER) tablet Take 1 tablet by mouth Daily.     • Omega-3 Fatty Acids (FISH OIL TRIPLE STRENGTH) 1400 MG capsule Take 1 capsule by mouth Daily.     • prednisoLONE acetate (PRED FORTE) 1 % ophthalmic suspension Administer 1 drop into the left eye Daily.     • vitamin C (ASCORBIC ACID) 500 MG tablet Take 500 mg by mouth Daily.     • [DISCONTINUED] citalopram (CeleXA) 10 MG tablet Take 1 tablet by mouth Daily. 90 tablet 3   • [DISCONTINUED] hydroCHLOROthiazide (HYDRODIURIL) 12.5 MG tablet Take 1 tablet by mouth Daily. 90 tablet 3   • acyclovir (ZOVIRAX) 400 MG tablet Take 1 tablet by mouth 2 (Two) Times a Day. Prn for outbreak for three days. 90 tablet 0     No current facility-administered medications on file prior to visit.        Allergies   Allergen Reactions   • Neomycin-Bacitracin Zn-Polymyx Rash   • Atorvastatin    •  "Pravastatin    • Sulfa Antibiotics    • Alendronate GI Intolerance       Immunization History   Administered Date(s) Administered   • Fluad Quad 65+ 09/23/2020   • Fluzone High Dose =>65 Years ([employer] ONLY) 10/14/2016, 10/11/2017, 10/17/2018, 09/23/2020   • Influenza TIV (IM) 09/25/2015   • Pneumococcal Conjugate 13-Valent (PCV13) 10/20/2015   • Pneumococcal Polysaccharide (PPSV23) 08/01/2011   • Shingrix 05/14/2019, 08/06/2019   • Td 01/01/2007   • Zostavax 05/14/2019, 08/06/2019       Objective     /82   Pulse 81   Ht 167.6 cm (65.98\")   Wt 63.5 kg (140 lb)   SpO2 98%   BMI 22.61 kg/m²     Physical Exam  Constitutional:       Appearance: She is well-developed.   HENT:      Head: Normocephalic and atraumatic.      Right Ear: Hearing and tympanic membrane normal.      Left Ear: Hearing and tympanic membrane normal.      Mouth/Throat:      Pharynx: No oropharyngeal exudate or posterior oropharyngeal erythema.   Cardiovascular:      Rate and Rhythm: Normal rate and regular rhythm.      Heart sounds: Normal heart sounds.   Pulmonary:      Effort: Pulmonary effort is normal.      Breath sounds: Normal breath sounds.   Abdominal:      General: There is no distension.      Palpations: Abdomen is soft. There is no mass.      Tenderness: There is no abdominal tenderness. There is no guarding or rebound.   Skin:     General: Skin is warm and dry.   Neurological:      Mental Status: She is alert and oriented to person, place, and time.   Psychiatric:         Behavior: Behavior normal.         Assessment/Plan   Diagnoses and all orders for this visit:    1. Medicare annual wellness visit, subsequent (Primary)    2. Well adult exam    3. Essential hypertension    4. Hyperlipidemia, unspecified hyperlipidemia type    5. PVD (peripheral vascular disease) (CMS/[facility])    6. Mild single current episode of major depressive disorder (CMS/HCC)    Other orders  -     hydroCHLOROthiazide (HYDRODIURIL) 12.5 MG tablet; Take 1 " tablet by mouth Daily.  Dispense: 90 tablet; Refill: 3  -     citalopram (CeleXA) 10 MG tablet; Take 1 tablet by mouth Daily.  Dispense: 90 tablet; Refill: 3        Discussion    AWV/cpe.  See below for levi history, PHQ-9, functional ability questionnaire, cognitive impairment screening.  Direct observation of cognitive abilities:  The patient does not exhibit any impairment in cognitive abilities upon direct observation at today's visit.   These were all reviewed with the patient and the patient was provided with a personal prevention plan of service in patient instructions.  Patient was given advice or information on the following topics:  nutrition, exercise.     Depression Screen:    PHQ-2/PHQ-9 Depression Screening 11/6/2020   Little interest or pleasure in doing things 0   Feeling down, depressed, or hopeless 0   Trouble falling or staying asleep, or sleeping too much -   Feeling tired or having little energy -   Poor appetite or overeating -   Feeling bad about yourself - or that you are a failure or have let yourself or your family down -   Trouble concentrating on things, such as reading the newspaper or watching television -   Moving or speaking so slowly that other people could have noticed. Or the opposite - being so fidgety or restless that you have been moving around a lot more than usual -   Thoughts that you would be better off dead, or of hurting yourself in some way -   Total Score 0   If you checked off any problems, how difficult have these problems made it for you to do your work, take care of things at home, or get along with other people? -       Fall Risk Screen:  STEADI Fall Risk Assessment was completed, and patient is at LOW risk for falls.Assessment completed on:11/6/2020    Health Habits and Functional/Cognitive screen:  Functional & Cognitive Status 11/6/2020   Do you have difficulty preparing food and eating? No   Do you have difficulty bathing yourself, getting dressed or grooming  yourself? No   Do you have difficulty using the toilet? No   Do you have difficulty moving around from place to place? No   Do you have trouble with steps or getting out of a bed or a chair? No   Current Diet Well Balanced Diet   Dental Exam Up to date   Eye Exam Up to date   Exercise (times per week) 2 times per week   Current Exercise Activities Include Housecleaning   Do you need help using the phone?  No   Are you deaf or do you have serious difficulty hearing?  No   Do you need help with transportation? No   Do you need help shopping? No   Do you need help preparing meals?  No   Do you need help with housework?  No   Do you need help with laundry? No   Do you need help taking your medications? No   Do you need help managing money? No   Do you ever drive or ride in a car without wearing a seat belt? No   Have you felt unusual stress, anger or loneliness in the last month? No   Who do you live with? Alone   If you need help, do you have trouble finding someone available to you? No   Have you been bothered in the last four weeks by sexual problems? No   Do you have difficulty concentrating, remembering or making decisions? No               Health Maintenance   Topic Date Due   • TDAP/TD VACCINES (1 - Tdap) 04/14/1947   • ANNUAL WELLNESS VISIT  10/30/2020   • LIPID PANEL  10/30/2021   • MAMMOGRAM  02/12/2022   • INFLUENZA VACCINE  Completed   • Pneumococcal Vaccine 65+  Completed   • DXA SCAN  Discontinued   • ZOSTER VACCINE  Discontinued            Future Appointments   Date Time Provider Department Center   11/12/2021 10:00 AM [facility]   11/19/2021 10:00 AM Arelis Lara MD [facility]

## 2020-11-11 ENCOUNTER — TELEPHONE (OUTPATIENT)
Dept: INTERNAL MEDICINE | Facility: CLINIC | Age: 85
End: 2020-11-11

## 2020-11-11 NOTE — TELEPHONE ENCOUNTER
Patient's daughter Asmita calling to request physical therapy order. Patient was in the office 11/06/2020.     Please call Asmita back at [phone].

## 2020-11-12 DIAGNOSIS — R29.898 WEAKNESS OF BOTH LOWER EXTREMITIES: Primary | ICD-10-CM

## 2020-11-12 NOTE — TELEPHONE ENCOUNTER
Talked with patient yesterday. She states leg weakness. Daughter sent my chart message and I forwarded to you.

## 2020-11-13 ENCOUNTER — TELEPHONE (OUTPATIENT)
Dept: INTERNAL MEDICINE | Facility: CLINIC | Age: 85
End: 2020-11-13

## 2020-11-13 NOTE — TELEPHONE ENCOUNTER
PATIENT'S DAUGHTER CALLED AND STATED THAT [facility] DID NOT RECEIVE A REFERRAL FOR THE PATIENT.    PATIENT CALL BACK: [phone]    PLEASE ADVISE:     [facility]  [address] , [place] [address]  [phone] FAX: [phone]

## 2020-12-02 ENCOUNTER — TELEPHONE (OUTPATIENT)
Dept: INTERNAL MEDICINE | Facility: CLINIC | Age: 85
End: 2020-12-02

## 2020-12-02 RX ORDER — CELECOXIB 200 MG/1
200 CAPSULE ORAL DAILY
Qty: 90 CAPSULE | Refills: 3 | Status: SHIPPED | OUTPATIENT
Start: 2020-12-02 | End: 2021-01-25 | Stop reason: SDUPTHER

## 2020-12-02 RX ORDER — HYDROCHLOROTHIAZIDE 12.5 MG/1
12.5 TABLET ORAL DAILY
Qty: 90 TABLET | Refills: 3 | Status: SHIPPED | OUTPATIENT
Start: 2020-12-02 | End: 2021-01-25 | Stop reason: SDUPTHER

## 2020-12-02 RX ORDER — CITALOPRAM 10 MG/1
10 TABLET ORAL DAILY
Qty: 90 TABLET | Refills: 3 | Status: SHIPPED | OUTPATIENT
Start: 2020-12-02 | End: 2021-01-25 | Stop reason: SDUPTHER

## 2020-12-02 NOTE — TELEPHONE ENCOUNTER
PATIENT REQUESTED A REFILL:    hydroCHLOROthiazide (HYDRODIURIL) 12.5 MG tablet    citalopram (CeleXA) 10 MG tablet    celecoxib (CeleBREX) 200 MG capsule     Pharmacy - [facility] - [facility] [address] - [phone]  - [phone] FX      PATIENT CAN BE REACHED ON:[phone]

## 2020-12-02 NOTE — TELEPHONE ENCOUNTER
PATIENT REQUESTED A REFILL ON hydroCHLOROthiazide (HYDRODIURIL) 12.5 MG tablet  citalopram (CeleXA) 10 MG tablet  celecoxib (CeleBREX) 200 MG capsule    PATIENT CAN BE REACHED ON:[phone]    PHARMACY PREFERRED:[facility] [address] [place] [id number] [facility] AT [facility] & [address] - [phone]  - [phone] FX

## 2021-01-25 ENCOUNTER — TELEPHONE (OUTPATIENT)
Dept: INTERNAL MEDICINE | Facility: CLINIC | Age: 86
End: 2021-01-25

## 2021-01-25 RX ORDER — CELECOXIB 200 MG/1
200 CAPSULE ORAL DAILY
Qty: 90 CAPSULE | Refills: 3 | Status: SHIPPED | OUTPATIENT
Start: 2021-01-25 | End: 2021-11-19 | Stop reason: SDUPTHER

## 2021-01-25 RX ORDER — HYDROCHLOROTHIAZIDE 12.5 MG/1
12.5 TABLET ORAL DAILY
Qty: 90 TABLET | Refills: 3 | Status: SHIPPED | OUTPATIENT
Start: 2021-01-25 | End: 2021-11-19 | Stop reason: SDUPTHER

## 2021-01-25 RX ORDER — CITALOPRAM 10 MG/1
10 TABLET ORAL DAILY
Qty: 90 TABLET | Refills: 3 | Status: SHIPPED | OUTPATIENT
Start: 2021-01-25 | End: 2021-11-19 | Stop reason: SDUPTHER

## 2021-01-25 NOTE — TELEPHONE ENCOUNTER
Caller: Cristela Peralta    Relationship to patient: Self    Best call back number: [phone]     Patient is needing: Patient is requesting a return call from Dr. Lara or her MA.      Please advise.

## 2021-01-28 RX ORDER — ACYCLOVIR 400 MG/1
TABLET ORAL
Qty: 90 TABLET | Refills: 0 | Status: SHIPPED | OUTPATIENT
Start: 2021-01-28 | End: 2021-03-01

## 2021-02-15 ENCOUNTER — IMMUNIZATION (OUTPATIENT)
Dept: VACCINE CLINIC | Facility: HOSPITAL | Age: 86
End: 2021-02-15

## 2021-02-15 PROCEDURE — 91300 HC SARSCOV02 VAC 30MCG/0.3ML IM: CPT | Performed by: INTERNAL MEDICINE

## 2021-02-15 PROCEDURE — 0001A: CPT | Performed by: INTERNAL MEDICINE

## 2021-02-19 ENCOUNTER — APPOINTMENT (OUTPATIENT)
Dept: WOMENS IMAGING | Facility: HOSPITAL | Age: 86
End: 2021-02-19

## 2021-02-19 PROCEDURE — 77063 BREAST TOMOSYNTHESIS BI: CPT | Performed by: RADIOLOGY

## 2021-02-19 PROCEDURE — 77067 SCR MAMMO BI INCL CAD: CPT | Performed by: RADIOLOGY

## 2021-03-01 RX ORDER — ACYCLOVIR 400 MG/1
TABLET ORAL
Qty: 90 TABLET | Refills: 0 | Status: SHIPPED | OUTPATIENT
Start: 2021-03-01 | End: 2021-04-21

## 2021-03-08 ENCOUNTER — IMMUNIZATION (OUTPATIENT)
Dept: VACCINE CLINIC | Facility: HOSPITAL | Age: 86
End: 2021-03-08

## 2021-03-08 PROCEDURE — 91300 HC SARSCOV02 VAC 30MCG/0.3ML IM: CPT | Performed by: INTERNAL MEDICINE

## 2021-03-08 PROCEDURE — 0002A: CPT | Performed by: INTERNAL MEDICINE

## 2021-04-21 RX ORDER — ACYCLOVIR 400 MG/1
TABLET ORAL
Qty: 90 TABLET | Refills: 0 | Status: SHIPPED | OUTPATIENT
Start: 2021-04-21 | End: 2021-10-08

## 2021-10-08 RX ORDER — ACYCLOVIR 400 MG/1
TABLET ORAL
Qty: 90 TABLET | Refills: 0 | Status: SHIPPED | OUTPATIENT
Start: 2021-10-08 | End: 2021-12-14 | Stop reason: SDUPTHER

## 2021-11-04 DIAGNOSIS — E78.5 HYPERLIPIDEMIA, UNSPECIFIED HYPERLIPIDEMIA TYPE: Primary | ICD-10-CM

## 2021-11-04 DIAGNOSIS — E55.9 VITAMIN D DEFICIENCY: ICD-10-CM

## 2021-11-04 DIAGNOSIS — I10 PRIMARY HYPERTENSION: ICD-10-CM

## 2021-11-13 LAB
25(OH)D3+25(OH)D2 SERPL-MCNC: 33.7 NG/ML (ref 30–100)
ALBUMIN SERPL-MCNC: 4 G/DL (ref 3.5–4.6)
ALBUMIN/GLOB SERPL: 1.7 {RATIO} (ref 1.2–2.2)
ALP SERPL-CCNC: 101 IU/L (ref 44–121)
ALT SERPL-CCNC: 6 IU/L (ref 0–32)
APPEARANCE UR: CLEAR
AST SERPL-CCNC: 16 IU/L (ref 0–40)
BACTERIA #/AREA URNS HPF: NORMAL /[HPF]
BASOPHILS # BLD AUTO: 0 X10E3/UL (ref 0–0.2)
BASOPHILS NFR BLD AUTO: 1 %
BILIRUB SERPL-MCNC: 0.4 MG/DL (ref 0–1.2)
BILIRUB UR QL STRIP: NEGATIVE
BUN SERPL-MCNC: 18 MG/DL (ref 10–36)
BUN/CREAT SERPL: 29 (ref 12–28)
CALCIUM SERPL-MCNC: 9.2 MG/DL (ref 8.7–10.3)
CASTS URNS QL MICRO: NORMAL /LPF
CHLORIDE SERPL-SCNC: 99 MMOL/L (ref 96–106)
CHOLEST SERPL-MCNC: 231 MG/DL (ref 100–199)
CO2 SERPL-SCNC: 25 MMOL/L (ref 20–29)
COLOR UR: YELLOW
CREAT SERPL-MCNC: 0.63 MG/DL (ref 0.57–1)
EOSINOPHIL # BLD AUTO: 0.1 X10E3/UL (ref 0–0.4)
EOSINOPHIL NFR BLD AUTO: 3 %
EPI CELLS #/AREA URNS HPF: NORMAL /HPF (ref 0–10)
ERYTHROCYTE [DISTWIDTH] IN BLOOD BY AUTOMATED COUNT: 12.4 % (ref 11.7–15.4)
GLOBULIN SER CALC-MCNC: 2.4 G/DL (ref 1.5–4.5)
GLUCOSE SERPL-MCNC: 85 MG/DL (ref 65–99)
GLUCOSE UR QL: NEGATIVE
HCT VFR BLD AUTO: 37.4 % (ref 34–46.6)
HDLC SERPL-MCNC: 56 MG/DL
HGB BLD-MCNC: 12.4 G/DL (ref 11.1–15.9)
HGB UR QL STRIP: NEGATIVE
IMM GRANULOCYTES # BLD AUTO: 0 X10E3/UL (ref 0–0.1)
IMM GRANULOCYTES NFR BLD AUTO: 0 %
KETONES UR QL STRIP: NEGATIVE
LDLC SERPL CALC-MCNC: 156 MG/DL (ref 0–99)
LEUKOCYTE ESTERASE UR QL STRIP: ABNORMAL
LYMPHOCYTES # BLD AUTO: 1.1 X10E3/UL (ref 0.7–3.1)
LYMPHOCYTES NFR BLD AUTO: 34 %
MCH RBC QN AUTO: 31.8 PG (ref 26.6–33)
MCHC RBC AUTO-ENTMCNC: 33.2 G/DL (ref 31.5–35.7)
MCV RBC AUTO: 96 FL (ref 79–97)
MICRO URNS: ABNORMAL
MONOCYTES # BLD AUTO: 0.4 X10E3/UL (ref 0.1–0.9)
MONOCYTES NFR BLD AUTO: 11 %
NEUTROPHILS # BLD AUTO: 1.7 X10E3/UL (ref 1.4–7)
NEUTROPHILS NFR BLD AUTO: 51 %
NITRITE UR QL STRIP: NEGATIVE
PH UR STRIP: 6 [PH] (ref 5–7.5)
PLATELET # BLD AUTO: 235 X10E3/UL (ref 150–450)
POTASSIUM SERPL-SCNC: 4.3 MMOL/L (ref 3.5–5.2)
PROT SERPL-MCNC: 6.4 G/DL (ref 6–8.5)
PROT UR QL STRIP: NEGATIVE
RBC # BLD AUTO: 3.9 X10E6/UL (ref 3.77–5.28)
RBC #/AREA URNS HPF: NORMAL /HPF (ref 0–2)
SODIUM SERPL-SCNC: 136 MMOL/L (ref 134–144)
SP GR UR: 1.02 (ref 1–1.03)
T4 FREE SERPL-MCNC: 0.96 NG/DL (ref 0.82–1.77)
TRIGL SERPL-MCNC: 107 MG/DL (ref 0–149)
TSH SERPL DL<=0.005 MIU/L-ACNC: 5.33 UIU/ML (ref 0.45–4.5)
UROBILINOGEN UR STRIP-MCNC: 0.2 MG/DL (ref 0.2–1)
VLDLC SERPL CALC-MCNC: 19 MG/DL (ref 5–40)
WBC # BLD AUTO: 3.2 X10E3/UL (ref 3.4–10.8)
WBC #/AREA URNS HPF: NORMAL /HPF (ref 0–5)

## 2021-11-19 ENCOUNTER — OFFICE VISIT (OUTPATIENT)
Dept: INTERNAL MEDICINE | Facility: CLINIC | Age: 86
End: 2021-11-19

## 2021-11-19 VITALS
BODY MASS INDEX: 22.5 KG/M2 | DIASTOLIC BLOOD PRESSURE: 70 MMHG | SYSTOLIC BLOOD PRESSURE: 110 MMHG | OXYGEN SATURATION: 98 % | HEART RATE: 64 BPM | HEIGHT: 66 IN | WEIGHT: 140 LBS

## 2021-11-19 DIAGNOSIS — F32.0 MILD SINGLE CURRENT EPISODE OF MAJOR DEPRESSIVE DISORDER (HCC): ICD-10-CM

## 2021-11-19 DIAGNOSIS — Z00.00 WELL ADULT EXAM: ICD-10-CM

## 2021-11-19 DIAGNOSIS — H61.21 CERUMINOSIS, RIGHT: ICD-10-CM

## 2021-11-19 DIAGNOSIS — I10 PRIMARY HYPERTENSION: ICD-10-CM

## 2021-11-19 DIAGNOSIS — E78.5 HYPERLIPIDEMIA, UNSPECIFIED HYPERLIPIDEMIA TYPE: ICD-10-CM

## 2021-11-19 DIAGNOSIS — Z00.00 MEDICARE ANNUAL WELLNESS VISIT, SUBSEQUENT: Primary | ICD-10-CM

## 2021-11-19 PROCEDURE — 1126F AMNT PAIN NOTED NONE PRSNT: CPT | Performed by: INTERNAL MEDICINE

## 2021-11-19 PROCEDURE — 1159F MED LIST DOCD IN RCRD: CPT | Performed by: INTERNAL MEDICINE

## 2021-11-19 PROCEDURE — 99397 PER PM REEVAL EST PAT 65+ YR: CPT | Performed by: INTERNAL MEDICINE

## 2021-11-19 PROCEDURE — G0439 PPPS, SUBSEQ VISIT: HCPCS | Performed by: INTERNAL MEDICINE

## 2021-11-19 PROCEDURE — 1170F FXNL STATUS ASSESSED: CPT | Performed by: INTERNAL MEDICINE

## 2021-11-19 PROCEDURE — 96160 PT-FOCUSED HLTH RISK ASSMT: CPT | Performed by: INTERNAL MEDICINE

## 2021-11-19 RX ORDER — CELECOXIB 200 MG/1
200 CAPSULE ORAL DAILY
Qty: 90 CAPSULE | Refills: 3 | Status: SHIPPED | OUTPATIENT
Start: 2021-11-19 | End: 2021-11-30 | Stop reason: SDUPTHER

## 2021-11-19 RX ORDER — CITALOPRAM 10 MG/1
10 TABLET ORAL DAILY
Qty: 90 TABLET | Refills: 3 | Status: SHIPPED | OUTPATIENT
Start: 2021-11-19 | End: 2021-11-30 | Stop reason: SDUPTHER

## 2021-11-19 RX ORDER — HYDROCHLOROTHIAZIDE 12.5 MG/1
12.5 TABLET ORAL DAILY
Qty: 90 TABLET | Refills: 3 | Status: SHIPPED | OUTPATIENT
Start: 2021-11-19 | End: 2021-11-30 | Stop reason: SDUPTHER

## 2021-11-19 NOTE — PROGRESS NOTES
The ABCs of the Annual Wellness Visit  Subsequent Medicare Wellness Visit    Chief Complaint   Patient presents with   • Medicare Wellness-subsequent   • Hypertension   • Hyperlipidemia   • Depression   • Annual Exam      Subjective    History of Present Illness:  Cristela Peralta is a 89 y.o. female who presents for a Subsequent Medicare Wellness Visit.    The following data was reviewed by: Arelis Lara MD on 11/19/2021:  Common labs    Common Labsle 11/12/21 11/12/21 11/12/21    1001 1001 1001   Glucose  85    BUN  18    Creatinine  0.63    eGFR Non  Am  78    eGFR African Am  89    Sodium  136    Potassium  4.3    Chloride  99    Calcium  9.2    Total Protein  6.4    Albumin  4.0    Total Bilirubin  0.4    Alkaline Phosphatase  101    AST (SGOT)  16    ALT (SGPT)  6    WBC 3.2 (A)     Hemoglobin 12.4     Hematocrit 37.4     Platelets 235     Total Cholesterol   231 (A)   Triglycerides   107   HDL Cholesterol   56   LDL Cholesterol    156 (A)   (A) Abnormal value       Comments are available for some flowsheets but are not being displayed.             HTN.  Control is good.   HLD.  She is off statin.  Follows a healthy diet.  MDD.  She feels well on Celexa.      The following portions of the patient's history were reviewed and   updated as appropriate: allergies, current medications, past family history, past medical history, past social history, past surgical history and problem list.    Compared to one year ago, the patient feels her physical   health is the same.    Compared to one year ago, the patient feels her mental   health is the same.    Recent Hospitalizations:  She was not admitted to the hospital during the last year.       Current Medical Providers:  Patient Care Team:  Arelis Lara MD as PCP - General    Outpatient Medications Prior to Visit   Medication Sig Dispense Refill   • acyclovir (ZOVIRAX) 400 MG tablet TAKE 1 TABLET TWO TIMES A DAY AS NEEDED FOR OUTBREAK FOR THREE DAYS.  "90 tablet 0   • celecoxib (CeleBREX) 200 MG capsule Take 1 capsule by mouth Daily. 90 capsule 3   • cetirizine (zyrTEC) 10 MG tablet Take 1 tablet by mouth Daily.     • citalopram (CeleXA) 10 MG tablet Take 1 tablet by mouth Daily. 90 tablet 3   • hydroCHLOROthiazide (HYDRODIURIL) 12.5 MG tablet Take 1 tablet by mouth Daily. 90 tablet 3   • Multiple Vitamins-Minerals (CENTRUM SILVER) tablet Take 1 tablet by mouth Daily.     • Omega-3 Fatty Acids (FISH OIL TRIPLE STRENGTH) 1400 MG capsule Take 1 capsule by mouth Daily.     • prednisoLONE acetate (PRED FORTE) 1 % ophthalmic suspension Administer 1 drop into the left eye Daily.     • vitamin C (ASCORBIC ACID) 500 MG tablet Take 500 mg by mouth Daily.       No facility-administered medications prior to visit.       No opioid medication identified on active medication list. I have reviewed chart for other potential  high risk medication/s and harmful drug interactions in the elderly.          Aspirin is not on active medication list.  Aspirin use is not indicated based on review of current medical condition/s. Risk of harm outweighs potential benefits.  .    Patient Active Problem List   Diagnosis   • Diastolic dysfunction   • Hyperlipidemia   • Hypertension   • Chronic low back pain   • Osteoarthritis   • Osteopenia   • Vitamin D deficiency   • Mild single current episode of major depressive disorder (HCC)   • PVD (peripheral vascular disease) ([facility])     Advance Care Planning  Advance Directive is on file.  ACP discussion was held with the patient during this visit. Patient has an advance directive in EMR which is still valid.           Objective    Vitals:    11/19/21 1003   BP: 110/70   Pulse: 64   SpO2: 98%   Weight: 63.5 kg (140 lb)   Height: 167.6 cm (65.98\")   PainSc: 0-No pain     BMI Readings from Last 1 Encounters:   11/19/21 22.61 kg/m²   BMI is within normal parameters. No follow-up required.    Does the patient have evidence of cognitive impairment? " No    Physical Exam  Constitutional:       Appearance: She is well-developed.   HENT:      Head: Normocephalic and atraumatic.      Right Ear: Hearing, tympanic membrane and external ear normal.      Left Ear: Hearing, tympanic membrane and external ear normal.      Nose: Nose normal.   Neck:      Thyroid: No thyromegaly.   Cardiovascular:      Rate and Rhythm: Normal rate and regular rhythm.      Heart sounds: Murmur heard.    Systolic murmur is present with a grade of 2/6.      Pulmonary:      Effort: Pulmonary effort is normal.      Breath sounds: Normal breath sounds.   Chest:   Breasts:      Right: No mass.      Left: No mass.       Abdominal:      General: There is no distension.      Palpations: Abdomen is soft.      Tenderness: There is no abdominal tenderness.   Musculoskeletal:      Cervical back: Neck supple.   Lymphadenopathy:      Cervical: No cervical adenopathy.   Skin:     General: Skin is warm and dry.   Neurological:      Mental Status: She is alert and oriented to person, place, and time.   Psychiatric:         Speech: Speech normal.         Behavior: Behavior normal.         Thought Content: Thought content normal.         Judgment: Judgment normal.       Lab Results   Component Value Date    CHLPL 231 (H) 11/12/2021    TRIG 107 11/12/2021    HDL 56 11/12/2021     (H) 11/12/2021    VLDL 19 11/12/2021            HEALTH RISK ASSESSMENT    Smoking Status:  Social History     Tobacco Use   Smoking Status Never Smoker   Smokeless Tobacco Never Used     Alcohol Consumption:  Social History     Substance and Sexual Activity   Alcohol Use No     Fall Risk Screen:    RENNY Fall Risk Assessment was completed, and patient is at LOW risk for falls.Assessment completed on:11/19/2021    Depression Screening:  PHQ-2/PHQ-9 Depression Screening 11/19/2021   Little interest or pleasure in doing things 0   Feeling down, depressed, or hopeless 0   Trouble falling or staying asleep, or sleeping too much -    Feeling tired or having little energy -   Poor appetite or overeating -   Feeling bad about yourself - or that you are a failure or have let yourself or your family down -   Trouble concentrating on things, such as reading the newspaper or watching television -   Moving or speaking so slowly that other people could have noticed. Or the opposite - being so fidgety or restless that you have been moving around a lot more than usual -   Thoughts that you would be better off dead, or of hurting yourself in some way -   Total Score 0   If you checked off any problems, how difficult have these problems made it for you to do your work, take care of things at home, or get along with other people? -       Health Habits and Functional and Cognitive Screening:  Functional & Cognitive Status 11/19/2021   Do you have difficulty preparing food and eating? No   Do you have difficulty bathing yourself, getting dressed or grooming yourself? No   Do you have difficulty using the toilet? No   Do you have difficulty moving around from place to place? No   Do you have trouble with steps or getting out of a bed or a chair? No   Current Diet Well Balanced Diet   Dental Exam Up to date   Eye Exam Up to date   Exercise (times per week) 3 times per week   Current Exercises Include Cardiovascular Workout   Current Exercise Activities Include -   Do you need help using the phone?  No   Are you deaf or do you have serious difficulty hearing?  No   Do you need help with transportation? No   Do you need help shopping? No   Do you need help preparing meals?  No   Do you need help with housework?  No   Do you need help with laundry? No   Do you need help taking your medications? No   Do you need help managing money? No   Do you ever drive or ride in a car without wearing a seat belt? No   Have you felt unusual stress, anger or loneliness in the last month? No   Who do you live with? Child   If you need help, do you have trouble finding someone  available to you? No   Have you been bothered in the last four weeks by sexual problems? No   Do you have difficulty concentrating, remembering or making decisions? No       Age-appropriate Screening Schedule:  Refer to the list below for future screening recommendations based on patient's age, sex and/or medical conditions. Orders for these recommended tests are listed in the plan section. The patient has been provided with a written plan.    Health Maintenance   Topic Date Due   • TDAP/TD VACCINES (2 - Tdap) 01/01/2017   • LIPID PANEL  11/12/2022   • MAMMOGRAM  02/19/2023   • INFLUENZA VACCINE  Completed   • DXA SCAN  Discontinued   • ZOSTER VACCINE  Discontinued              Assessment/Plan   CMS Preventative Services Quick Reference  Risk Factors Identified During Encounter  None Identified  The above risks/problems have been discussed with the patient.  Follow up actions/plans if indicated are seen below in the Assessment/Plan Section.  Pertinent information has been shared with the patient in the After Visit Summary.    Diagnoses and all orders for this visit:    1. Medicare annual wellness visit, subsequent (Primary)    2. Well adult exam    3. Primary hypertension    4. Hyperlipidemia, unspecified hyperlipidemia type    5. Mild single current episode of major depressive disorder (HCC)    6. Ceruminosis, right    HTN.  Control is good.  The patient is advised to continue current dosage of hctz.  HLD.  Continue healthy diet.  MDD.  Control is good.  The patient is advised to continue current dosage of Celexa.    Right ceruminosis.  Irrigation done today.          Follow Up:   Return in about 1 year (around 11/19/2022) for Medicare Wellness.     An After Visit Summary and PPPS were made available to the patient.

## 2021-11-19 NOTE — PATIENT INSTRUCTIONS
Medicare Wellness  Personal Prevention Plan of Service     Date of Office Visit:  2021  Encounter Provider:  Arelis Lara MD  Place of Service:  [facility]  Patient Name: Cristela Peralta  :  1928    As part of the Medicare Wellness portion of your visit today, we are providing you with this personalized preventive plan of services (PPPS). This plan is based upon recommendations of the [place] Preventive Services Task Force (USPSTF) and the Advisory Committee on Immunization Practices (ACIP).    This lists the preventive care services that should be considered, and provides dates of when you are due. Items listed as completed are up-to-date and do not require any further intervention.    Health Maintenance   Topic Date Due   • TDAP/TD VACCINES (2 - Tdap) 2017   • ANNUAL WELLNESS VISIT  2021   • LIPID PANEL  2022   • MAMMOGRAM  2023   • COVID-19 Vaccine  Completed   • INFLUENZA VACCINE  Completed   • Pneumococcal Vaccine 65+  Completed   • DXA SCAN  Discontinued   • ZOSTER VACCINE  Discontinued       No orders of the defined types were placed in this encounter.      Return in about 1 year (around 2022) for Medicare Wellness.

## 2021-11-30 ENCOUNTER — TELEPHONE (OUTPATIENT)
Dept: INTERNAL MEDICINE | Facility: CLINIC | Age: 86
End: 2021-11-30

## 2021-11-30 RX ORDER — HYDROCHLOROTHIAZIDE 12.5 MG/1
12.5 TABLET ORAL DAILY
Qty: 90 TABLET | Refills: 3 | Status: SHIPPED | OUTPATIENT
Start: 2021-11-30 | End: 2021-11-30 | Stop reason: SDUPTHER

## 2021-11-30 RX ORDER — CITALOPRAM 10 MG/1
10 TABLET ORAL DAILY
Qty: 90 TABLET | Refills: 3 | Status: SHIPPED | OUTPATIENT
Start: 2021-11-30 | End: 2021-12-03

## 2021-11-30 RX ORDER — HYDROCHLOROTHIAZIDE 12.5 MG/1
12.5 TABLET ORAL DAILY
Qty: 90 TABLET | Refills: 3 | Status: SHIPPED | OUTPATIENT
Start: 2021-11-30 | End: 2021-12-03

## 2021-11-30 RX ORDER — CELECOXIB 200 MG/1
200 CAPSULE ORAL DAILY
Qty: 90 CAPSULE | Refills: 3 | Status: SHIPPED | OUTPATIENT
Start: 2021-11-30 | End: 2022-03-17

## 2021-11-30 RX ORDER — CELECOXIB 200 MG/1
200 CAPSULE ORAL DAILY
Qty: 90 CAPSULE | Refills: 3 | Status: SHIPPED | OUTPATIENT
Start: 2021-11-30 | End: 2021-11-30 | Stop reason: SDUPTHER

## 2021-11-30 RX ORDER — CITALOPRAM 10 MG/1
10 TABLET ORAL DAILY
Qty: 90 TABLET | Refills: 3 | Status: SHIPPED | OUTPATIENT
Start: 2021-11-30 | End: 2021-11-30 | Stop reason: SDUPTHER

## 2021-12-03 RX ORDER — CITALOPRAM 10 MG/1
TABLET ORAL
Qty: 90 TABLET | Refills: 3 | Status: SHIPPED | OUTPATIENT
Start: 2021-12-03 | End: 2022-12-01

## 2021-12-03 RX ORDER — HYDROCHLOROTHIAZIDE 12.5 MG/1
TABLET ORAL
Qty: 90 TABLET | Refills: 3 | Status: SHIPPED | OUTPATIENT
Start: 2021-12-03 | End: 2022-12-01

## 2021-12-14 RX ORDER — ACYCLOVIR 400 MG/1
400 TABLET ORAL 2 TIMES DAILY
Qty: 90 TABLET | Refills: 0 | Status: SHIPPED | OUTPATIENT
Start: 2021-12-14 | End: 2022-01-17

## 2022-01-17 RX ORDER — ACYCLOVIR 400 MG/1
TABLET ORAL
Qty: 90 TABLET | Refills: 0 | Status: SHIPPED | OUTPATIENT
Start: 2022-01-17 | End: 2022-02-21

## 2022-02-21 RX ORDER — ACYCLOVIR 400 MG/1
TABLET ORAL
Qty: 90 TABLET | Refills: 0 | Status: SHIPPED | OUTPATIENT
Start: 2022-02-21 | End: 2022-04-05

## 2022-03-03 ENCOUNTER — TELEPHONE (OUTPATIENT)
Dept: INTERNAL MEDICINE | Facility: CLINIC | Age: 87
End: 2022-03-03

## 2022-03-03 NOTE — TELEPHONE ENCOUNTER
Caller: Cristela Peralta    Relationship: Self    Best call back number: [phone]     What is the best time to reach you: ANY TIME    Who are you requesting to speak with (clinical staff, provider,  specific staff member): CLINICAL    What was the call regarding: PATIENT HAS REQUESTED TO SPEAK WITH DR. GAY ABOUT HER RECENT PRESCRIPTION REFILL.    Do you require a callback: YES

## 2022-03-07 ENCOUNTER — APPOINTMENT (OUTPATIENT)
Dept: WOMENS IMAGING | Facility: HOSPITAL | Age: 87
End: 2022-03-07

## 2022-03-07 PROCEDURE — 77063 BREAST TOMOSYNTHESIS BI: CPT | Performed by: RADIOLOGY

## 2022-03-07 PROCEDURE — 77067 SCR MAMMO BI INCL CAD: CPT | Performed by: RADIOLOGY

## 2022-03-17 RX ORDER — CELECOXIB 200 MG/1
CAPSULE ORAL
Qty: 90 CAPSULE | Refills: 3 | Status: SHIPPED | OUTPATIENT
Start: 2022-03-17 | End: 2022-12-02 | Stop reason: SDUPTHER

## 2022-04-05 RX ORDER — ACYCLOVIR 400 MG/1
TABLET ORAL
Qty: 90 TABLET | Refills: 0 | Status: SHIPPED | OUTPATIENT
Start: 2022-04-05 | End: 2022-06-07

## 2022-06-07 RX ORDER — ACYCLOVIR 400 MG/1
TABLET ORAL
Qty: 90 TABLET | Refills: 0 | Status: SHIPPED | OUTPATIENT
Start: 2022-06-07 | End: 2022-08-23

## 2022-08-23 RX ORDER — ACYCLOVIR 400 MG/1
TABLET ORAL
Qty: 90 TABLET | Refills: 0 | Status: SHIPPED | OUTPATIENT
Start: 2022-08-23

## 2022-11-17 DIAGNOSIS — E55.9 VITAMIN D DEFICIENCY: ICD-10-CM

## 2022-11-17 DIAGNOSIS — E78.5 HYPERLIPIDEMIA, UNSPECIFIED HYPERLIPIDEMIA TYPE: Primary | ICD-10-CM

## 2022-11-17 DIAGNOSIS — I10 PRIMARY HYPERTENSION: ICD-10-CM

## 2022-11-22 LAB
25(OH)D3+25(OH)D2 SERPL-MCNC: 37.9 NG/ML (ref 30–100)
ALBUMIN SERPL-MCNC: 4.3 G/DL (ref 3.5–4.6)
ALBUMIN/GLOB SERPL: 1.9 {RATIO} (ref 1.2–2.2)
ALP SERPL-CCNC: 96 IU/L (ref 44–121)
ALT SERPL-CCNC: 9 IU/L (ref 0–32)
APPEARANCE UR: CLEAR
AST SERPL-CCNC: 18 IU/L (ref 0–40)
BACTERIA #/AREA URNS HPF: NORMAL /[HPF]
BASOPHILS # BLD AUTO: 0 X10E3/UL (ref 0–0.2)
BASOPHILS NFR BLD AUTO: 1 %
BILIRUB SERPL-MCNC: 0.5 MG/DL (ref 0–1.2)
BILIRUB UR QL STRIP: NEGATIVE
BUN SERPL-MCNC: 17 MG/DL (ref 10–36)
BUN/CREAT SERPL: 26 (ref 12–28)
CALCIUM SERPL-MCNC: 9.5 MG/DL (ref 8.7–10.3)
CASTS URNS QL MICRO: NORMAL /LPF
CHLORIDE SERPL-SCNC: 100 MMOL/L (ref 96–106)
CHOLEST SERPL-MCNC: 243 MG/DL (ref 100–199)
CO2 SERPL-SCNC: 28 MMOL/L (ref 20–29)
COLOR UR: YELLOW
CREAT SERPL-MCNC: 0.66 MG/DL (ref 0.57–1)
EGFRCR SERPLBLD CKD-EPI 2021: 81 ML/MIN/1.73
EOSINOPHIL # BLD AUTO: 0.1 X10E3/UL (ref 0–0.4)
EOSINOPHIL NFR BLD AUTO: 2 %
EPI CELLS #/AREA URNS HPF: NORMAL /HPF (ref 0–10)
ERYTHROCYTE [DISTWIDTH] IN BLOOD BY AUTOMATED COUNT: 13.1 % (ref 11.7–15.4)
GLOBULIN SER CALC-MCNC: 2.3 G/DL (ref 1.5–4.5)
GLUCOSE SERPL-MCNC: 100 MG/DL (ref 70–99)
GLUCOSE UR QL STRIP: NEGATIVE
HCT VFR BLD AUTO: 37.8 % (ref 34–46.6)
HDLC SERPL-MCNC: 54 MG/DL
HGB BLD-MCNC: 12.8 G/DL (ref 11.1–15.9)
HGB UR QL STRIP: NEGATIVE
IMM GRANULOCYTES # BLD AUTO: 0 X10E3/UL (ref 0–0.1)
IMM GRANULOCYTES NFR BLD AUTO: 0 %
KETONES UR QL STRIP: NEGATIVE
LDLC SERPL CALC-MCNC: 163 MG/DL (ref 0–99)
LEUKOCYTE ESTERASE UR QL STRIP: NEGATIVE
LYMPHOCYTES # BLD AUTO: 1 X10E3/UL (ref 0.7–3.1)
LYMPHOCYTES NFR BLD AUTO: 29 %
MCH RBC QN AUTO: 32.6 PG (ref 26.6–33)
MCHC RBC AUTO-ENTMCNC: 33.9 G/DL (ref 31.5–35.7)
MCV RBC AUTO: 96 FL (ref 79–97)
MICRO URNS: NORMAL
MICRO URNS: NORMAL
MONOCYTES # BLD AUTO: 0.3 X10E3/UL (ref 0.1–0.9)
MONOCYTES NFR BLD AUTO: 9 %
NEUTROPHILS # BLD AUTO: 2.1 X10E3/UL (ref 1.4–7)
NEUTROPHILS NFR BLD AUTO: 59 %
NITRITE UR QL STRIP: NEGATIVE
PH UR STRIP: 7 [PH] (ref 5–7.5)
PLATELET # BLD AUTO: 227 X10E3/UL (ref 150–450)
POTASSIUM SERPL-SCNC: 4.4 MMOL/L (ref 3.5–5.2)
PROT SERPL-MCNC: 6.6 G/DL (ref 6–8.5)
PROT UR QL STRIP: NEGATIVE
RBC # BLD AUTO: 3.93 X10E6/UL (ref 3.77–5.28)
RBC #/AREA URNS HPF: NORMAL /HPF (ref 0–2)
SODIUM SERPL-SCNC: 139 MMOL/L (ref 134–144)
SP GR UR STRIP: 1.02 (ref 1–1.03)
T4 FREE SERPL-MCNC: 0.99 NG/DL (ref 0.82–1.77)
TRIGL SERPL-MCNC: 145 MG/DL (ref 0–149)
TSH SERPL DL<=0.005 MIU/L-ACNC: 5.63 UIU/ML (ref 0.45–4.5)
UROBILINOGEN UR STRIP-MCNC: 0.2 MG/DL (ref 0.2–1)
VLDLC SERPL CALC-MCNC: 26 MG/DL (ref 5–40)
WBC # BLD AUTO: 3.6 X10E3/UL (ref 3.4–10.8)
WBC #/AREA URNS HPF: NORMAL /HPF (ref 0–5)

## 2022-11-29 ENCOUNTER — OFFICE VISIT (OUTPATIENT)
Dept: INTERNAL MEDICINE | Facility: CLINIC | Age: 87
End: 2022-11-29

## 2022-11-29 VITALS
SYSTOLIC BLOOD PRESSURE: 124 MMHG | BODY MASS INDEX: 22.02 KG/M2 | HEIGHT: 66 IN | DIASTOLIC BLOOD PRESSURE: 80 MMHG | WEIGHT: 137 LBS | OXYGEN SATURATION: 99 % | HEART RATE: 97 BPM

## 2022-11-29 DIAGNOSIS — I73.9 PVD (PERIPHERAL VASCULAR DISEASE): ICD-10-CM

## 2022-11-29 DIAGNOSIS — Z78.0 MENOPAUSE: ICD-10-CM

## 2022-11-29 DIAGNOSIS — Z00.00 WELL ADULT EXAM: ICD-10-CM

## 2022-11-29 DIAGNOSIS — Z00.00 MEDICARE ANNUAL WELLNESS VISIT, SUBSEQUENT: Primary | ICD-10-CM

## 2022-11-29 PROCEDURE — 1159F MED LIST DOCD IN RCRD: CPT | Performed by: INTERNAL MEDICINE

## 2022-11-29 PROCEDURE — 1170F FXNL STATUS ASSESSED: CPT | Performed by: INTERNAL MEDICINE

## 2022-11-29 PROCEDURE — 99397 PER PM REEVAL EST PAT 65+ YR: CPT | Performed by: INTERNAL MEDICINE

## 2022-11-29 PROCEDURE — 96160 PT-FOCUSED HLTH RISK ASSMT: CPT | Performed by: INTERNAL MEDICINE

## 2022-11-29 PROCEDURE — 1126F AMNT PAIN NOTED NONE PRSNT: CPT | Performed by: INTERNAL MEDICINE

## 2022-11-29 PROCEDURE — G0439 PPPS, SUBSEQ VISIT: HCPCS | Performed by: INTERNAL MEDICINE

## 2022-11-29 RX ORDER — ASPIRIN 81 MG/1
81 TABLET ORAL DAILY
Qty: 90 TABLET | Refills: 3 | Status: SHIPPED | OUTPATIENT
Start: 2022-11-29

## 2022-11-29 RX ORDER — ROSUVASTATIN CALCIUM 10 MG/1
10 TABLET, COATED ORAL DAILY
Qty: 90 TABLET | Refills: 3 | Status: SHIPPED | OUTPATIENT
Start: 2022-11-29 | End: 2022-12-02 | Stop reason: SDUPTHER

## 2022-11-29 NOTE — PROGRESS NOTES
The ABCs of the Annual Wellness Visit  Subsequent Medicare Wellness Visit    Chief Complaint   Patient presents with   • Medicare Wellness-subsequent   • Annual Exam      Subjective    History of Present Illness:  Cristela Peralta is a 89 y.o. female who presents for a Subsequent Medicare Wellness Visit.    The following data was reviewed by: Arelis Lara MD on 11/29/2022:  Common labs    Common Labs 11/21/22 11/21/22 11/21/22    1104 1104 1104   Glucose  100 (A)    BUN  17    Creatinine  0.66    Sodium  139    Potassium  4.4    Chloride  100    Calcium  9.5    Total Protein  6.6    Albumin  4.3    Total Bilirubin  0.5    Alkaline Phosphatase  96    AST (SGOT)  18    ALT (SGPT)  9    WBC 3.6     Hemoglobin 12.8     Hematocrit 37.8     Platelets 227     Total Cholesterol   243 (A)   Triglycerides   145   HDL Cholesterol   54   LDL Cholesterol    163 (A)   (A) Abnormal value            C/o claudication.  She gets pain in her calves with walking.  Better when she rests.  Known PVD by a screening test in the past.        The following portions of the patient's history were reviewed and   updated as appropriate: allergies, current medications, past family history, past medical history, past social history, past surgical history and problem list.    Compared to one year ago, the patient feels her physical   health is the same.    Compared to one year ago, the patient feels her mental   health is the same.    Recent Hospitalizations:  She was not admitted to the hospital during the last year.       Current Medical Providers:  Patient Care Team:  Arelis Lara MD as PCP - General    Outpatient Medications Prior to Visit   Medication Sig Dispense Refill   • acyclovir (ZOVIRAX) 400 MG tablet TAKE 1 TABLET TWO TIMES A DAY. TAKE NO MORE THAN 5 DOSES A DAY. 90 tablet 0   • celecoxib (CeleBREX) 200 MG capsule TAKE 1 CAPSULE EVERY DAY 90 capsule 3   • cetirizine (zyrTEC) 10 MG tablet Take 1 tablet by mouth Daily.     •  "citalopram (CeleXA) 10 MG tablet TAKE 1 TABLET EVERY DAY 90 tablet 3   • hydroCHLOROthiazide (HYDRODIURIL) 12.5 MG tablet TAKE 1 TABLET EVERY DAY 90 tablet 3   • Multiple Vitamins-Minerals (CENTRUM SILVER) tablet Take 1 tablet by mouth Daily.     • Omega-3 Fatty Acids (FISH OIL TRIPLE STRENGTH) 1400 MG capsule Take 1 capsule by mouth Daily.     • prednisoLONE acetate (PRED FORTE) 1 % ophthalmic suspension Administer 1 drop into the left eye Daily.     • vitamin C (ASCORBIC ACID) 500 MG tablet Take 500 mg by mouth Daily.       No facility-administered medications prior to visit.       No opioid medication identified on active medication list. I have reviewed chart for other potential  high risk medication/s and harmful drug interactions in the elderly.          Aspirin is on active medication list. Aspirin use is indicated based on review of current medical condition/s. Pros and cons of this therapy have been discussed today. Benefits of this medication outweigh potential harm.  Patient has been encouraged to continue taking this medication.  .      Patient Active Problem List   Diagnosis   • Diastolic dysfunction   • Hyperlipidemia   • Hypertension   • Chronic low back pain   • Osteoarthritis   • Osteopenia   • Vitamin D deficiency   • Mild single current episode of major depressive disorder (HCC)   • PVD (peripheral vascular disease) ([facility])     Advance Care Planning  Advance Directive is on file.  ACP discussion was held with the patient during this visit. Patient has an advance directive in EMR which is still valid.     Review of Systems   HENT: Positive for hearing loss.    Respiratory: Negative.    Cardiovascular: Negative.         Objective    Vitals:    11/29/22 1122   BP: 124/80   Pulse: 97   SpO2: 99%   Weight: 62.1 kg (137 lb)   Height: 167.6 cm (65.98\")   PainSc: 0-No pain     Estimated body mass index is 22.12 kg/m² as calculated from the following:    Height as of this encounter: 167.6 cm (65.98\").    " Weight as of this encounter: 62.1 kg (137 lb).    BMI is within normal parameters. No other follow-up for BMI required.      Does the patient have evidence of cognitive impairment? No    Physical Exam  Constitutional:       Appearance: She is well-developed.   HENT:      Head: Normocephalic and atraumatic.      Right Ear: Hearing and tympanic membrane normal.      Left Ear: Hearing and tympanic membrane normal.      Mouth/Throat:      Pharynx: No oropharyngeal exudate or posterior oropharyngeal erythema.   Cardiovascular:      Rate and Rhythm: Normal rate and regular rhythm.      Pulses:           Dorsalis pedis pulses are 0 on the right side and 0 on the left side.        Posterior tibial pulses are 0 on the right side and 0 on the left side.      Heart sounds: Normal heart sounds.   Pulmonary:      Effort: Pulmonary effort is normal.      Breath sounds: Normal breath sounds.   Skin:     General: Skin is warm and dry.   Neurological:      Mental Status: She is alert and oriented to person, place, and time.   Psychiatric:         Behavior: Behavior normal.       Lab Results   Component Value Date    CHLPL 243 (H) 11/21/2022    TRIG 145 11/21/2022    HDL 54 11/21/2022     (H) 11/21/2022    VLDL 26 11/21/2022            HEALTH RISK ASSESSMENT    Smoking Status:  Social History     Tobacco Use   Smoking Status Never   Smokeless Tobacco Never     Alcohol Consumption:  Social History     Substance and Sexual Activity   Alcohol Use No     Fall Risk Screen:    STEADI Fall Risk Assessment was completed, and patient is at LOW risk for falls.Assessment completed on:11/29/2022    Depression Screening:  PHQ-2/PHQ-9 Depression Screening 11/29/2022   Retired PHQ-9 Total Score -   Retired Total Score -   Little Interest or Pleasure in Doing Things 0-->not at all   Feeling Down, Depressed or Hopeless 0-->not at all   PHQ-9: Brief Depression Severity Measure Score 0       Health Habits and Functional and Cognitive  Screening:  Functional & Cognitive Status 11/29/2022   Do you have difficulty preparing food and eating? No   Do you have difficulty bathing yourself, getting dressed or grooming yourself? No   Do you have difficulty using the toilet? No   Do you have difficulty moving around from place to place? No   Do you have trouble with steps or getting out of a bed or a chair? No   Current Diet Well Balanced Diet   Dental Exam Up to date   Eye Exam Up to date   Exercise (times per week) 0 times per week   Current Exercises Include No Regular Exercise   Current Exercise Activities Include -   Do you need help using the phone?  No   Are you deaf or do you have serious difficulty hearing?  No   Do you need help with transportation? No   Do you need help shopping? No   Do you need help preparing meals?  No   Do you need help with housework?  No   Do you need help with laundry? No   Do you need help taking your medications? No   Do you need help managing money? No   Do you ever drive or ride in a car without wearing a seat belt? No   Have you felt unusual stress, anger or loneliness in the last month? No   Who do you live with? Alone   If you need help, do you have trouble finding someone available to you? No   Have you been bothered in the last four weeks by sexual problems? No   Do you have difficulty concentrating, remembering or making decisions? No       Age-appropriate Screening Schedule:  Refer to the list below for future screening recommendations based on patient's age, sex and/or medical conditions. Orders for these recommended tests are listed in the plan section. The patient has been provided with a written plan.    Health Maintenance   Topic Date Due   • TDAP/TD VACCINES (2 - Tdap) 01/01/2017   • LIPID PANEL  11/21/2023   • MAMMOGRAM  03/07/2024   • INFLUENZA VACCINE  Completed   • DXA SCAN  Discontinued   • ZOSTER VACCINE  Discontinued              Assessment & Plan   CMS Preventative Services Quick Reference  Risk  Factors Identified During Encounter  Immunizations Discussed/Encouraged (specific Immunizations; Tdap  The above risks/problems have been discussed with the patient.  Follow up actions/plans if indicated are seen below in the Assessment/Plan Section.  Pertinent information has been shared with the patient in the After Visit Summary.    Diagnoses and all orders for this visit:    1. Medicare annual wellness visit, subsequent (Primary)    2. Well adult exam    3. Menopause  -     DEXA Bone Density Axial; Future    4. PVD (peripheral vascular disease) (HCC)  -     Doppler Arterial Lower Extremity Stress CAR; Future    Other orders  -     rosuvastatin (Crestor) 10 MG tablet; Take 1 tablet by mouth Daily.  Dispense: 90 tablet; Refill: 3  -     aspirin 81 MG EC tablet; Take 1 tablet by mouth Daily.  Dispense: 90 tablet; Refill: 3    Patient presents today with claudication with known PVD.  Arterial stress is ordered to further evaluate.  Add aspirin and statin to her regimen.  F/u after test and in three months to check labs.      Follow Up:   Return in about 3 months (around 2/28/2023) for Recheck.     An After Visit Summary and PPPS were made available to the patient.

## 2022-12-01 ENCOUNTER — TELEPHONE (OUTPATIENT)
Dept: INTERNAL MEDICINE | Facility: CLINIC | Age: 87
End: 2022-12-01

## 2022-12-01 RX ORDER — HYDROCHLOROTHIAZIDE 12.5 MG/1
TABLET ORAL
Qty: 90 TABLET | Refills: 3 | Status: SHIPPED | OUTPATIENT
Start: 2022-12-01 | End: 2022-12-02 | Stop reason: SDUPTHER

## 2022-12-01 RX ORDER — CITALOPRAM 10 MG/1
TABLET ORAL
Qty: 90 TABLET | Refills: 3 | Status: SHIPPED | OUTPATIENT
Start: 2022-12-01 | End: 2022-12-02 | Stop reason: SDUPTHER

## 2022-12-01 NOTE — TELEPHONE ENCOUNTER
"  Caller: Cristela Peralta    Relationship to patient: Self    Best call back number: [phone]    Patient is needing: PATIENT ASKS THAT ALL PRESCRIPTIONS FOR HER \"EVERYDAY MEDICINE\" BE SENT TO   [facility] [address] - [phone]  - [phone]    PATIENT STATES SHE NEEDS REFILLS AND THAT DR GAY KNOWS WHICH MEDICATIONS TO SEND PRESCRIPTIONS FOR             "

## 2022-12-02 RX ORDER — CITALOPRAM 10 MG/1
10 TABLET ORAL DAILY
Qty: 90 TABLET | Refills: 3 | Status: SHIPPED | OUTPATIENT
Start: 2022-12-02

## 2022-12-02 RX ORDER — ROSUVASTATIN CALCIUM 10 MG/1
10 TABLET, COATED ORAL DAILY
Qty: 90 TABLET | Refills: 3 | Status: SHIPPED | OUTPATIENT
Start: 2022-12-02

## 2022-12-02 RX ORDER — HYDROCHLOROTHIAZIDE 12.5 MG/1
12.5 TABLET ORAL DAILY
Qty: 90 TABLET | Refills: 3 | Status: SHIPPED | OUTPATIENT
Start: 2022-12-02

## 2022-12-02 RX ORDER — CELECOXIB 200 MG/1
200 CAPSULE ORAL DAILY
Qty: 90 CAPSULE | Refills: 3 | Status: SHIPPED | OUTPATIENT
Start: 2022-12-02

## 2023-03-02 DIAGNOSIS — I10 PRIMARY HYPERTENSION: ICD-10-CM

## 2023-03-02 DIAGNOSIS — E78.5 HYPERLIPIDEMIA, UNSPECIFIED HYPERLIPIDEMIA TYPE: Primary | ICD-10-CM

## 2023-03-03 LAB
ALBUMIN SERPL-MCNC: 4.5 G/DL (ref 3.5–5.2)
ALBUMIN/GLOB SERPL: 2 G/DL
ALP SERPL-CCNC: 105 U/L (ref 39–117)
ALT SERPL-CCNC: 14 U/L (ref 1–33)
AST SERPL-CCNC: 19 U/L (ref 1–32)
BASOPHILS # BLD AUTO: 0.03 10*3/MM3 (ref 0–0.2)
BASOPHILS NFR BLD AUTO: 0.7 % (ref 0–1.5)
BILIRUB SERPL-MCNC: 0.4 MG/DL (ref 0–1.2)
BUN SERPL-MCNC: 16 MG/DL (ref 8–23)
BUN/CREAT SERPL: 24.6 (ref 7–25)
CALCIUM SERPL-MCNC: 9.6 MG/DL (ref 8.2–9.6)
CHLORIDE SERPL-SCNC: 100 MMOL/L (ref 98–107)
CHOLEST SERPL-MCNC: 156 MG/DL (ref 0–200)
CO2 SERPL-SCNC: 31.1 MMOL/L (ref 22–29)
CREAT SERPL-MCNC: 0.65 MG/DL (ref 0.57–1)
EGFRCR SERPLBLD CKD-EPI 2021: 81.7 ML/MIN/1.73
EOSINOPHIL # BLD AUTO: 0.19 10*3/MM3 (ref 0–0.4)
EOSINOPHIL NFR BLD AUTO: 4.5 % (ref 0.3–6.2)
ERYTHROCYTE [DISTWIDTH] IN BLOOD BY AUTOMATED COUNT: 12.3 % (ref 12.3–15.4)
GLOBULIN SER CALC-MCNC: 2.3 GM/DL
GLUCOSE SERPL-MCNC: 100 MG/DL (ref 65–99)
HCT VFR BLD AUTO: 34.6 % (ref 34–46.6)
HDLC SERPL-MCNC: 70 MG/DL (ref 40–60)
HGB BLD-MCNC: 11.8 G/DL (ref 12–15.9)
IMM GRANULOCYTES # BLD AUTO: 0.01 10*3/MM3 (ref 0–0.05)
IMM GRANULOCYTES NFR BLD AUTO: 0.2 % (ref 0–0.5)
LDLC SERPL CALC-MCNC: 75 MG/DL (ref 0–100)
LYMPHOCYTES # BLD AUTO: 1.03 10*3/MM3 (ref 0.7–3.1)
LYMPHOCYTES NFR BLD AUTO: 24.5 % (ref 19.6–45.3)
MCH RBC QN AUTO: 33.1 PG (ref 26.6–33)
MCHC RBC AUTO-ENTMCNC: 34.1 G/DL (ref 31.5–35.7)
MCV RBC AUTO: 96.9 FL (ref 79–97)
MONOCYTES # BLD AUTO: 0.48 10*3/MM3 (ref 0.1–0.9)
MONOCYTES NFR BLD AUTO: 11.4 % (ref 5–12)
NEUTROPHILS # BLD AUTO: 2.46 10*3/MM3 (ref 1.7–7)
NEUTROPHILS NFR BLD AUTO: 58.7 % (ref 42.7–76)
NRBC BLD AUTO-RTO: 0 /100 WBC (ref 0–0.2)
PLATELET # BLD AUTO: 224 10*3/MM3 (ref 140–450)
POTASSIUM SERPL-SCNC: 4.8 MMOL/L (ref 3.5–5.2)
PROT SERPL-MCNC: 6.8 G/DL (ref 6–8.5)
RBC # BLD AUTO: 3.57 10*6/MM3 (ref 3.77–5.28)
SODIUM SERPL-SCNC: 137 MMOL/L (ref 136–145)
TRIGL SERPL-MCNC: 51 MG/DL (ref 0–150)
VLDLC SERPL CALC-MCNC: 11 MG/DL (ref 5–40)
WBC # BLD AUTO: 4.2 10*3/MM3 (ref 3.4–10.8)

## 2023-03-07 ENCOUNTER — APPOINTMENT (OUTPATIENT)
Dept: CARDIOLOGY | Facility: HOSPITAL | Age: 88
End: 2023-03-07

## 2023-03-07 ENCOUNTER — HOSPITAL ENCOUNTER (OUTPATIENT)
Dept: CARDIOLOGY | Facility: HOSPITAL | Age: 88
Discharge: HOME | End: 2023-03-07
Admitting: INTERNAL MEDICINE
Payer: MEDICARE

## 2023-03-07 DIAGNOSIS — I73.9 PVD (PERIPHERAL VASCULAR DISEASE): ICD-10-CM

## 2023-03-07 PROCEDURE — 93923 UPR/LXTR ART STDY 3+ LVLS: CPT

## 2023-03-08 ENCOUNTER — TRANSCRIBE ORDERS (OUTPATIENT)
Dept: PET IMAGING | Facility: HOSPITAL | Age: 88
End: 2023-03-08
Payer: MEDICARE

## 2023-03-08 ENCOUNTER — OFFICE VISIT (OUTPATIENT)
Dept: INTERNAL MEDICINE | Facility: CLINIC | Age: 88
End: 2023-03-08
Payer: MEDICARE

## 2023-03-08 ENCOUNTER — APPOINTMENT (OUTPATIENT)
Dept: WOMENS IMAGING | Facility: HOSPITAL | Age: 88
End: 2023-03-08
Payer: MEDICARE

## 2023-03-08 ENCOUNTER — HOSPITAL ENCOUNTER (OUTPATIENT)
Dept: PET IMAGING | Facility: HOSPITAL | Age: 88
Discharge: HOME | End: 2023-03-08
Payer: MEDICARE

## 2023-03-08 VITALS
WEIGHT: 138 LBS | HEIGHT: 66 IN | DIASTOLIC BLOOD PRESSURE: 84 MMHG | HEART RATE: 80 BPM | SYSTOLIC BLOOD PRESSURE: 112 MMHG | OXYGEN SATURATION: 98 % | BODY MASS INDEX: 22.18 KG/M2

## 2023-03-08 DIAGNOSIS — E78.5 HYPERLIPIDEMIA, UNSPECIFIED HYPERLIPIDEMIA TYPE: ICD-10-CM

## 2023-03-08 DIAGNOSIS — I73.9 PVD (PERIPHERAL VASCULAR DISEASE): ICD-10-CM

## 2023-03-08 DIAGNOSIS — M81.0 HIGH RISK FOR FRACTURE DUE TO OSTEOPOROSIS BY DEXA SCAN: Primary | ICD-10-CM

## 2023-03-08 DIAGNOSIS — I10 PRIMARY HYPERTENSION: Primary | ICD-10-CM

## 2023-03-08 LAB
BH CV LOWER ARTERIAL LEFT ABI RATIO: 0.51
BH CV LOWER ARTERIAL LEFT CALF RATIO: 0.66
BH CV LOWER ARTERIAL LEFT DORSALIS PEDIS SYS MAX: 83
BH CV LOWER ARTERIAL LEFT GREAT TOE SYS MAX: 46
BH CV LOWER ARTERIAL LEFT HIGH THIGH RATIO: 1.01
BH CV LOWER ARTERIAL LEFT HIGH THIGH SYS MAX: 166
BH CV LOWER ARTERIAL LEFT LOW THIGH RATIO: 0.9
BH CV LOWER ARTERIAL LEFT LOW THIGH SYS MAX: 147
BH CV LOWER ARTERIAL LEFT POPLITEAL SYS MAX: 108
BH CV LOWER ARTERIAL LEFT POST TIBIAL SYS MAX: 81
BH CV LOWER ARTERIAL LEFT TBI RATIO: 0.28
BH CV LOWER ARTERIAL RIGHT ABI RATIO: 0.4
BH CV LOWER ARTERIAL RIGHT CALF RATIO: 0.53
BH CV LOWER ARTERIAL RIGHT DORSALIS PEDIS SYS MAX: 65
BH CV LOWER ARTERIAL RIGHT GREAT TOE SYS MAX: 50
BH CV LOWER ARTERIAL RIGHT HIGH THIGH RATIO: 0.87
BH CV LOWER ARTERIAL RIGHT HIGH THIGH SYS MAX: 142
BH CV LOWER ARTERIAL RIGHT LOW THIGH RATIO: 0.72
BH CV LOWER ARTERIAL RIGHT LOW THIGH SYS MAX: 118
BH CV LOWER ARTERIAL RIGHT POPLITEAL SYS MAX: 87
BH CV LOWER ARTERIAL RIGHT POST TIBIAL SYS MAX: 65
BH CV LOWER ARTERIAL RIGHT TBI RATIO: 0.3
MAXIMAL PREDICTED HEART RATE: 126 BPM
STRESS TARGET HR: 107 BPM
UPPER ARTERIAL LEFT ARM BRACHIAL SYS MAX: 156 MMHG
UPPER ARTERIAL RIGHT ARM BRACHIAL SYS MAX: 164 MMHG

## 2023-03-08 PROCEDURE — 77067 SCR MAMMO BI INCL CAD: CPT | Performed by: RADIOLOGY

## 2023-03-08 PROCEDURE — 77063 BREAST TOMOSYNTHESIS BI: CPT | Performed by: RADIOLOGY

## 2023-03-08 PROCEDURE — 77080 DXA BONE DENSITY AXIAL: CPT

## 2023-03-08 PROCEDURE — 77080 DXA BONE DENSITY AXIAL: CPT | Performed by: RADIOLOGY

## 2023-03-08 PROCEDURE — 99214 OFFICE O/P EST MOD 30 MIN: CPT | Performed by: INTERNAL MEDICINE

## 2023-03-08 PROCEDURE — 1160F RVW MEDS BY RX/DR IN RCRD: CPT | Performed by: INTERNAL MEDICINE

## 2023-03-08 PROCEDURE — 1159F MED LIST DOCD IN RCRD: CPT | Performed by: INTERNAL MEDICINE

## 2023-03-08 NOTE — PROGRESS NOTES
Subjective     Cristela Peralta is a 89 y.o. female who presents with   Chief Complaint   Patient presents with   • Hypertension   • Hyperlipidemia       History of Present Illness     The following data was reviewed by: Arelis Lara MD on 03/08/2023:  Common labs    Common Labs 11/21/22 11/21/22 11/21/22 3/3/23 3/3/23 3/3/23    1104 1104 1104 1025 1025 1025   Glucose  100 (A)   100 (A)    BUN  17   16    Creatinine  0.66   0.65    Sodium  139   137    Potassium  4.4   4.8    Chloride  100   100    Calcium  9.5   9.6    Total Protein  6.6   6.8    Albumin  4.3   4.5    Total Bilirubin  0.5   0.4    Alkaline Phosphatase  96   105    AST (SGOT)  18   19    ALT (SGPT)  9   14    WBC 3.6   4.20     Hemoglobin 12.8   11.8 (A)     Hematocrit 37.8   34.6     Platelets 227   224     Total Cholesterol   243 (A)   156   Triglycerides   145   51   HDL Cholesterol   54   70 (A)   LDL Cholesterol    163 (A)   75   (A) Abnormal value       Comments are available for some flowsheets but are not being displayed.           HTN.  Control is good.  HLD.  Good LDL control.    PVD.  Reviewed recent vascular test.  Leg pain much better after instituting regular exercise and starting an aspirin and statin regimen.     Review of Systems    The following portions of the patient's history were reviewed and updated as appropriate: allergies, current medications and problem list.    Patient Active Problem List    Diagnosis Date Noted   • PVD (peripheral vascular disease) ([facility]) 06/24/2019     Note Last Updated: 6/24/2019     By [employer] screen     • Mild single current episode of major depressive disorder (HCC) 10/24/2018   • Vitamin D deficiency 10/14/2016   • Diastolic dysfunction 05/13/2016   • Hyperlipidemia 05/13/2016   • Hypertension 05/13/2016   • Chronic low back pain 05/13/2016   • Osteoarthritis 05/13/2016   • Osteopenia 05/13/2016     Note Last Updated: 5/13/2016     S/p five years of bisphosphonates.           Current Outpatient  "Medications on File Prior to Visit   Medication Sig Dispense Refill   • acyclovir (ZOVIRAX) 400 MG tablet TAKE 1 TABLET TWO TIMES A DAY. TAKE NO MORE THAN 5 DOSES A DAY. 90 tablet 0   • aspirin 81 MG EC tablet Take 1 tablet by mouth Daily. 90 tablet 3   • celecoxib (CeleBREX) 200 MG capsule Take 1 capsule by mouth Daily. 90 capsule 3   • cetirizine (zyrTEC) 10 MG tablet Take 1 tablet by mouth Daily.     • citalopram (CeleXA) 10 MG tablet Take 1 tablet by mouth Daily. 90 tablet 3   • hydroCHLOROthiazide (HYDRODIURIL) 12.5 MG tablet Take 1 tablet by mouth Daily. 90 tablet 3   • Multiple Vitamins-Minerals (CENTRUM SILVER) tablet Take 1 tablet by mouth Daily.     • Omega-3 Fatty Acids (FISH OIL TRIPLE STRENGTH) 1400 MG capsule Take 1 capsule by mouth Daily.     • prednisoLONE acetate (PRED FORTE) 1 % ophthalmic suspension Administer 1 drop into the left eye Daily.     • rosuvastatin (Crestor) 10 MG tablet Take 1 tablet by mouth Daily. 90 tablet 3   • vitamin C (ASCORBIC ACID) 500 MG tablet Take 1 tablet by mouth Daily.       No current facility-administered medications on file prior to visit.       Objective     /84   Pulse 80   Ht 167.6 cm (65.98\")   Wt 62.6 kg (138 lb)   SpO2 98%   BMI 22.28 kg/m²     Physical Exam  Constitutional:       Appearance: She is well-developed.   HENT:      Head: Normocephalic and atraumatic.   Cardiovascular:      Rate and Rhythm: Normal rate and regular rhythm.      Heart sounds: Normal heart sounds.   Pulmonary:      Effort: Pulmonary effort is normal.      Breath sounds: Normal breath sounds.   Skin:     General: Skin is warm and dry.   Neurological:      Mental Status: She is alert and oriented to person, place, and time.   Psychiatric:         Behavior: Behavior normal.         Assessment & Plan   Diagnoses and all orders for this visit:    1. Primary hypertension (Primary)    2. Hyperlipidemia, unspecified hyperlipidemia type    3. PVD (peripheral vascular disease) " (HCC)  -     Ambulatory Referral to Vascular Surgery        Discussion    HTN.  Control is good.  The patient is advised to continue current dosage of HCTZ.    HLD.  Control is good.  The patient is advised to continue current dosage of Crestor.   PVD.  Severe.  I would like her to see vascular for opinion about any additional non-invasive recommendations.         Future Appointments   Date Time Provider Department Center   6/7/2023 10:20 AM [facility]   6/13/2023  2:30 PM Arelis Lara MD [facility]   11/28/2023 11:00 AM [facility]   12/5/2023 11:15 AM Arelis Lara MD [facility]

## 2023-04-10 DIAGNOSIS — M81.8 OTHER OSTEOPOROSIS WITHOUT CURRENT PATHOLOGICAL FRACTURE: Primary | ICD-10-CM

## 2023-04-14 DIAGNOSIS — M81.0 OSTEOPOROSIS, UNSPECIFIED OSTEOPOROSIS TYPE, UNSPECIFIED PATHOLOGICAL FRACTURE PRESENCE: ICD-10-CM

## 2023-04-14 DIAGNOSIS — M81.8 OTHER OSTEOPOROSIS WITHOUT CURRENT PATHOLOGICAL FRACTURE: Primary | ICD-10-CM

## 2023-04-20 ENCOUNTER — TRANSCRIBE ORDERS (OUTPATIENT)
Dept: ADMINISTRATIVE | Facility: HOSPITAL | Age: 88
End: 2023-04-20
Payer: MEDICARE

## 2023-04-20 DIAGNOSIS — I73.9 PAD (PERIPHERAL ARTERY DISEASE): Primary | ICD-10-CM

## 2023-04-24 ENCOUNTER — HOSPITAL ENCOUNTER (OUTPATIENT)
Dept: CT IMAGING | Facility: HOSPITAL | Age: 88
Discharge: HOME | End: 2023-04-24
Payer: MEDICARE

## 2023-04-24 DIAGNOSIS — I73.9 PAD (PERIPHERAL ARTERY DISEASE): ICD-10-CM

## 2023-04-24 LAB — CREAT BLDA-MCNC: 0.6 MG/DL (ref 0.6–1.3)

## 2023-04-24 PROCEDURE — 82565 ASSAY OF CREATININE: CPT

## 2023-04-24 PROCEDURE — 75635 CT ANGIO ABDOMINAL ARTERIES: CPT

## 2023-04-24 PROCEDURE — 25510000001 IOPAMIDOL PER 1 ML: Performed by: SURGERY

## 2023-04-24 RX ADMIN — IOPAMIDOL 95 ML: 755 INJECTION, SOLUTION INTRAVENOUS at 12:30

## 2023-04-25 DIAGNOSIS — M81.8 OTHER OSTEOPOROSIS WITHOUT CURRENT PATHOLOGICAL FRACTURE: Primary | ICD-10-CM

## 2023-04-28 ENCOUNTER — LAB (OUTPATIENT)
Dept: OTHER | Facility: HOSPITAL | Age: 88
End: 2023-04-28
Payer: MEDICARE

## 2023-04-28 ENCOUNTER — INFUSION (OUTPATIENT)
Dept: ONCOLOGY | Facility: HOSPITAL | Age: 88
End: 2023-04-28
Payer: MEDICARE

## 2023-04-28 VITALS — TEMPERATURE: 97.5 F

## 2023-04-28 DIAGNOSIS — M81.8 OTHER OSTEOPOROSIS WITHOUT CURRENT PATHOLOGICAL FRACTURE: Primary | ICD-10-CM

## 2023-04-28 DIAGNOSIS — M81.8 OTHER OSTEOPOROSIS WITHOUT CURRENT PATHOLOGICAL FRACTURE: ICD-10-CM

## 2023-04-28 LAB
25(OH)D3 SERPL-MCNC: 53.8 NG/ML (ref 30–100)
ALBUMIN SERPL-MCNC: 3.9 G/DL (ref 3.5–5.2)
ALBUMIN/GLOB SERPL: 1.4 G/DL
ALP SERPL-CCNC: 98 U/L (ref 39–117)
ALT SERPL W P-5'-P-CCNC: 12 U/L (ref 1–33)
ANION GAP SERPL CALCULATED.3IONS-SCNC: 12.2 MMOL/L (ref 5–15)
AST SERPL-CCNC: 23 U/L (ref 1–32)
BILIRUB SERPL-MCNC: 0.3 MG/DL (ref 0–1.2)
BUN SERPL-MCNC: 14 MG/DL (ref 8–23)
BUN/CREAT SERPL: 25 (ref 7–25)
CALCIUM SPEC-SCNC: 9.4 MG/DL (ref 8.2–9.6)
CHLORIDE SERPL-SCNC: 97 MMOL/L (ref 98–107)
CO2 SERPL-SCNC: 26.8 MMOL/L (ref 22–29)
CREAT SERPL-MCNC: 0.56 MG/DL (ref 0.57–1)
EGFRCR SERPLBLD CKD-EPI 2021: 84.2 ML/MIN/1.73
GLOBULIN UR ELPH-MCNC: 2.8 GM/DL
GLUCOSE SERPL-MCNC: 101 MG/DL (ref 65–99)
MAGNESIUM SERPL-MCNC: 2 MG/DL (ref 1.7–2.3)
PHOSPHATE SERPL-MCNC: 3.2 MG/DL (ref 2.5–4.5)
POTASSIUM SERPL-SCNC: 4.6 MMOL/L (ref 3.5–5.2)
PROT SERPL-MCNC: 6.7 G/DL (ref 6–8.5)
SODIUM SERPL-SCNC: 136 MMOL/L (ref 136–145)

## 2023-04-28 PROCEDURE — 96372 THER/PROPH/DIAG INJ SC/IM: CPT

## 2023-04-28 PROCEDURE — 25010000002 DENOSUMAB 60 MG/ML SOLUTION PREFILLED SYRINGE: Performed by: INTERNAL MEDICINE

## 2023-04-28 PROCEDURE — 80053 COMPREHEN METABOLIC PANEL: CPT | Performed by: INTERNAL MEDICINE

## 2023-04-28 PROCEDURE — 84100 ASSAY OF PHOSPHORUS: CPT | Performed by: INTERNAL MEDICINE

## 2023-04-28 PROCEDURE — 82306 VITAMIN D 25 HYDROXY: CPT | Performed by: INTERNAL MEDICINE

## 2023-04-28 PROCEDURE — 83735 ASSAY OF MAGNESIUM: CPT | Performed by: INTERNAL MEDICINE

## 2023-04-28 RX ADMIN — DENOSUMAB 60 MG: 60 INJECTION SUBCUTANEOUS at 14:32

## 2023-04-28 NOTE — NURSING NOTE
Arrived for first prolia injection. Indication and side effects reviewed. Denies recent dental work. Labs and medications verified. Prolia administered in R arm without incidence. Instructed to call prescribing MD for any concerns or questions and instructed on how to schedule future appts.  Pt vu. Pt remained for 15 minutes post injection to monitor for and adverse reaction. None noted and pt discharged in stable condition in the company of her daughter.

## 2023-06-06 DIAGNOSIS — E78.5 HYPERLIPIDEMIA, UNSPECIFIED HYPERLIPIDEMIA TYPE: Primary | ICD-10-CM

## 2023-06-06 DIAGNOSIS — I10 PRIMARY HYPERTENSION: ICD-10-CM

## 2023-06-07 LAB
ALBUMIN SERPL-MCNC: 4.2 G/DL (ref 3.5–5.2)
ALBUMIN/GLOB SERPL: 1.8 G/DL
ALP SERPL-CCNC: 108 U/L (ref 39–117)
ALT SERPL-CCNC: 11 U/L (ref 1–33)
AST SERPL-CCNC: 21 U/L (ref 1–32)
BASOPHILS # BLD AUTO: 0.03 10*3/MM3 (ref 0–0.2)
BASOPHILS NFR BLD AUTO: 0.8 % (ref 0–1.5)
BILIRUB SERPL-MCNC: 0.3 MG/DL (ref 0–1.2)
BUN SERPL-MCNC: 20 MG/DL (ref 8–23)
BUN/CREAT SERPL: 29.4 (ref 7–25)
CALCIUM SERPL-MCNC: 9.2 MG/DL (ref 8.2–9.6)
CHLORIDE SERPL-SCNC: 102 MMOL/L (ref 98–107)
CHOLEST SERPL-MCNC: 150 MG/DL (ref 0–200)
CO2 SERPL-SCNC: 30.5 MMOL/L (ref 22–29)
CREAT SERPL-MCNC: 0.68 MG/DL (ref 0.57–1)
EGFRCR SERPLBLD CKD-EPI 2021: 80.3 ML/MIN/1.73
EOSINOPHIL # BLD AUTO: 0.09 10*3/MM3 (ref 0–0.4)
EOSINOPHIL NFR BLD AUTO: 2.3 % (ref 0.3–6.2)
ERYTHROCYTE [DISTWIDTH] IN BLOOD BY AUTOMATED COUNT: 12.4 % (ref 12.3–15.4)
GLOBULIN SER CALC-MCNC: 2.3 GM/DL
GLUCOSE SERPL-MCNC: 79 MG/DL (ref 65–99)
HCT VFR BLD AUTO: 36.2 % (ref 34–46.6)
HDLC SERPL-MCNC: 58 MG/DL (ref 40–60)
HGB BLD-MCNC: 12.2 G/DL (ref 12–15.9)
IMM GRANULOCYTES # BLD AUTO: 0.01 10*3/MM3 (ref 0–0.05)
IMM GRANULOCYTES NFR BLD AUTO: 0.3 % (ref 0–0.5)
LDLC SERPL CALC-MCNC: 73 MG/DL (ref 0–100)
LYMPHOCYTES # BLD AUTO: 0.9 10*3/MM3 (ref 0.7–3.1)
LYMPHOCYTES NFR BLD AUTO: 22.7 % (ref 19.6–45.3)
MCH RBC QN AUTO: 31.9 PG (ref 26.6–33)
MCHC RBC AUTO-ENTMCNC: 33.7 G/DL (ref 31.5–35.7)
MCV RBC AUTO: 94.8 FL (ref 79–97)
MONOCYTES # BLD AUTO: 0.39 10*3/MM3 (ref 0.1–0.9)
MONOCYTES NFR BLD AUTO: 9.8 % (ref 5–12)
NEUTROPHILS # BLD AUTO: 2.55 10*3/MM3 (ref 1.7–7)
NEUTROPHILS NFR BLD AUTO: 64.1 % (ref 42.7–76)
NRBC BLD AUTO-RTO: 0 /100 WBC (ref 0–0.2)
PLATELET # BLD AUTO: 207 10*3/MM3 (ref 140–450)
POTASSIUM SERPL-SCNC: 4.3 MMOL/L (ref 3.5–5.2)
PROT SERPL-MCNC: 6.5 G/DL (ref 6–8.5)
RBC # BLD AUTO: 3.82 10*6/MM3 (ref 3.77–5.28)
SODIUM SERPL-SCNC: 140 MMOL/L (ref 136–145)
TRIGL SERPL-MCNC: 104 MG/DL (ref 0–150)
VLDLC SERPL CALC-MCNC: 19 MG/DL (ref 5–40)
WBC # BLD AUTO: 3.97 10*3/MM3 (ref 3.4–10.8)

## 2023-06-13 ENCOUNTER — OFFICE VISIT (OUTPATIENT)
Dept: INTERNAL MEDICINE | Facility: CLINIC | Age: 88
End: 2023-06-13
Payer: MEDICARE

## 2023-06-13 VITALS
HEIGHT: 65 IN | WEIGHT: 139 LBS | HEART RATE: 81 BPM | OXYGEN SATURATION: 98 % | DIASTOLIC BLOOD PRESSURE: 74 MMHG | BODY MASS INDEX: 23.16 KG/M2 | SYSTOLIC BLOOD PRESSURE: 120 MMHG

## 2023-06-13 DIAGNOSIS — I10 PRIMARY HYPERTENSION: Primary | ICD-10-CM

## 2023-06-13 DIAGNOSIS — E78.5 HYPERLIPIDEMIA, UNSPECIFIED HYPERLIPIDEMIA TYPE: ICD-10-CM

## 2023-06-13 PROCEDURE — 99214 OFFICE O/P EST MOD 30 MIN: CPT | Performed by: INTERNAL MEDICINE

## 2023-06-13 PROCEDURE — 1160F RVW MEDS BY RX/DR IN RCRD: CPT | Performed by: INTERNAL MEDICINE

## 2023-06-13 PROCEDURE — 1159F MED LIST DOCD IN RCRD: CPT | Performed by: INTERNAL MEDICINE

## 2023-06-13 NOTE — PROGRESS NOTES
Subjective     Cristela Peralta is a 89 y.o. female who presents with   Chief Complaint   Patient presents with    Hypertension    Hyperlipidemia       Hypertension    Hyperlipidemia       The following data was reviewed by: Arelis Lara MD on 06/13/2023:  CMP          4/24/2023    12:03 4/28/2023    13:33 6/7/2023    10:17   CMP   Glucose  101  79    BUN  14  20    Creatinine 0.60  0.56  0.68    EGFR  84.2     Sodium  136  140    Potassium  4.6  4.3    Chloride  97  102    Calcium  9.4  9.2    Total Protein   6.5    Total Protein  6.7     Albumin  3.9  4.2    Globulin   2.3    Globulin  2.8     Total Bilirubin  0.3  0.3    Alkaline Phosphatase  98  108    AST (SGOT)  23  21    ALT (SGPT)  12  11    Albumin/Globulin Ratio  1.4     BUN/Creatinine Ratio  25.0  29.4    Anion Gap  12.2       CBC          11/21/2022    11:04 3/3/2023    10:25 6/7/2023    10:17   CBC   WBC 3.6  4.20  3.97    RBC 3.93  3.57  3.82    Hemoglobin 12.8  11.8  12.2    Hematocrit 37.8  34.6  36.2    MCV 96  96.9  94.8    MCH 32.6  33.1  31.9    MCHC 33.9  34.1  33.7    RDW 13.1  12.3  12.4    Platelets 227  224  207      Lipid Panel          11/21/2022    11:04 3/3/2023    10:25 6/7/2023    10:17   Lipid Panel   Total Cholesterol 243  156  150    Triglycerides 145  51  104    HDL Cholesterol 54  70  58    VLDL Cholesterol 26  11  19    LDL Cholesterol  163  75  73      TSH          11/21/2022    11:04   TSH   TSH 5.630      HTN.  Blood pressure is under good control.  HLD.  LDL cholesterol is under good control.        Review of Systems   Respiratory: Negative.     Cardiovascular: Negative.      The following portions of the patient's history were reviewed and updated as appropriate: allergies, current medications and problem list.    Patient Active Problem List    Diagnosis Date Noted    PVD (peripheral vascular disease) 06/24/2019     Note Last Updated: 6/24/2019     By [employer] [facility] screen      Mild single current episode of major depressive  "disorder 10/24/2018    Vitamin D deficiency 10/14/2016    Diastolic dysfunction 05/13/2016    Hyperlipidemia 05/13/2016    Hypertension 05/13/2016    Chronic low back pain 05/13/2016    Osteoarthritis 05/13/2016    Other osteoporosis without current pathological fracture 05/13/2016     Note Last Updated: 6/13/2023     S/p five years of bisphosphonates.  Prolia started in 2023/.           Current Outpatient Medications on File Prior to Visit   Medication Sig Dispense Refill    aspirin 81 MG EC tablet Take 1 tablet by mouth Daily. 90 tablet 3    CALCIUM-MAGNESIUM-VITAMIN D PO Take  by mouth.      celecoxib (CeleBREX) 200 MG capsule Take 1 capsule by mouth Daily. 90 capsule 3    cetirizine (zyrTEC) 10 MG tablet Take 1 tablet by mouth Daily.      citalopram (CeleXA) 10 MG tablet Take 1 tablet by mouth Daily. 90 tablet 3    Coenzyme Q10 (COQ10 PO) Take  by mouth.      hydroCHLOROthiazide (HYDRODIURIL) 12.5 MG tablet Take 1 tablet by mouth Daily. 90 tablet 3    Multiple Vitamins-Minerals (CENTRUM SILVER) tablet Take 1 tablet by mouth Daily.      Omega-3 Fatty Acids (FISH OIL TRIPLE STRENGTH) 1400 MG capsule Take 1 capsule by mouth Daily.      prednisoLONE acetate (PRED FORTE) 1 % ophthalmic suspension Administer 1 drop into the left eye Daily.      rosuvastatin (Crestor) 10 MG tablet Take 1 tablet by mouth Daily. 90 tablet 3    vitamin C (ASCORBIC ACID) 500 MG tablet Take 1 tablet by mouth Daily.      [DISCONTINUED] doxycycline (VIBRAMYCIN) 100 MG capsule Take 1 capsule by mouth 2 (Two) Times a Day. 14 capsule 0    [DISCONTINUED] guaiFENesin-codeine (GUAIFENESIN AC) 100-10 MG/5ML liquid Take 5 ml QHS prn for cough. 90 mL 0     No current facility-administered medications on file prior to visit.       Objective     /74   Pulse 81   Ht 165.1 cm (65\")   Wt 63 kg (139 lb)   LMP  (LMP Unknown) Comment: TOTAL  SpO2 98%   BMI 23.13 kg/m²     Physical Exam  Constitutional:       Appearance: She is well-developed. "   HENT:      Head: Normocephalic and atraumatic.   Cardiovascular:      Rate and Rhythm: Normal rate and regular rhythm.      Heart sounds: Normal heart sounds.   Pulmonary:      Effort: Pulmonary effort is normal.      Breath sounds: Normal breath sounds.   Skin:     General: Skin is warm and dry.   Neurological:      Mental Status: She is alert and oriented to person, place, and time.   Psychiatric:         Behavior: Behavior normal.       Assessment & Plan   Diagnoses and all orders for this visit:    1. Primary hypertension (Primary)    2. Hyperlipidemia, unspecified hyperlipidemia type        Discussion    HTN.  Control is good.  The patient is advised to continue current dosage of HCTZ.    HLD. Control is good.  The patient is advised to continue current dosage of Crestor.           Future Appointments   Date Time Provider Department Center   10/30/2023  2:30 PM REFERRED INJECTION CHAIR [facility]   11/28/2023 11:00 AM [facility]   12/5/2023 11:15 AM Arelis Lara MD [facility]

## 2023-11-02 ENCOUNTER — TRANSCRIBE ORDERS (OUTPATIENT)
Dept: ADMINISTRATIVE | Facility: HOSPITAL | Age: 88
End: 2023-11-02
Payer: MEDICARE

## 2023-11-02 DIAGNOSIS — I65.23 CAROTID STENOSIS, BILATERAL: Primary | ICD-10-CM

## 2023-12-08 ENCOUNTER — HOSPITAL ENCOUNTER (OUTPATIENT)
Dept: CT IMAGING | Facility: HOSPITAL | Age: 88
Discharge: HOME | End: 2023-12-08
Admitting: SURGERY
Payer: MEDICARE

## 2023-12-08 DIAGNOSIS — I65.23 CAROTID STENOSIS, BILATERAL: ICD-10-CM

## 2023-12-08 PROCEDURE — 70498 CT ANGIOGRAPHY NECK: CPT

## 2023-12-08 PROCEDURE — 70496 CT ANGIOGRAPHY HEAD: CPT

## 2023-12-08 PROCEDURE — 82565 ASSAY OF CREATININE: CPT

## 2023-12-08 PROCEDURE — 25510000001 IOPAMIDOL PER 1 ML: Performed by: SURGERY

## 2023-12-08 RX ADMIN — IOPAMIDOL 95 ML: 755 INJECTION, SOLUTION INTRAVENOUS at 12:03

## 2023-12-09 LAB — CREAT BLDA-MCNC: 0.9 MG/DL (ref 0.6–1.3)

## 2023-12-11 DIAGNOSIS — E78.5 HYPERLIPIDEMIA, UNSPECIFIED HYPERLIPIDEMIA TYPE: Primary | ICD-10-CM

## 2023-12-11 DIAGNOSIS — E55.9 VITAMIN D DEFICIENCY: ICD-10-CM

## 2023-12-11 DIAGNOSIS — I10 PRIMARY HYPERTENSION: ICD-10-CM

## 2023-12-12 LAB
25(OH)D3+25(OH)D2 SERPL-MCNC: 34 NG/ML (ref 30–100)
ALBUMIN SERPL-MCNC: 4.2 G/DL (ref 3.6–4.6)
ALBUMIN/GLOB SERPL: 1.7 {RATIO} (ref 1.2–2.2)
ALP SERPL-CCNC: 87 IU/L (ref 44–121)
ALT SERPL-CCNC: 9 IU/L (ref 0–32)
APPEARANCE UR: CLEAR
AST SERPL-CCNC: 20 IU/L (ref 0–40)
BACTERIA #/AREA URNS HPF: NORMAL /[HPF]
BASOPHILS # BLD AUTO: 0 X10E3/UL (ref 0–0.2)
BASOPHILS NFR BLD AUTO: 1 %
BILIRUB SERPL-MCNC: 0.4 MG/DL (ref 0–1.2)
BILIRUB UR QL STRIP: NEGATIVE
BUN SERPL-MCNC: 14 MG/DL (ref 10–36)
BUN/CREAT SERPL: 19 (ref 12–28)
CALCIUM SERPL-MCNC: 9.1 MG/DL (ref 8.7–10.3)
CASTS URNS QL MICRO: NORMAL /LPF
CHLORIDE SERPL-SCNC: 101 MMOL/L (ref 96–106)
CHOLEST SERPL-MCNC: 148 MG/DL (ref 100–199)
CO2 SERPL-SCNC: 26 MMOL/L (ref 20–29)
COLOR UR: YELLOW
CREAT SERPL-MCNC: 0.75 MG/DL (ref 0.57–1)
EGFRCR SERPLBLD CKD-EPI 2021: 73 ML/MIN/1.73
EOSINOPHIL # BLD AUTO: 0.1 X10E3/UL (ref 0–0.4)
EOSINOPHIL NFR BLD AUTO: 4 %
EPI CELLS #/AREA URNS HPF: NORMAL /HPF (ref 0–10)
ERYTHROCYTE [DISTWIDTH] IN BLOOD BY AUTOMATED COUNT: 12.5 % (ref 11.7–15.4)
GLOBULIN SER CALC-MCNC: 2.5 G/DL (ref 1.5–4.5)
GLUCOSE SERPL-MCNC: 96 MG/DL (ref 70–99)
GLUCOSE UR QL STRIP: NEGATIVE
HCT VFR BLD AUTO: 35.6 % (ref 34–46.6)
HDLC SERPL-MCNC: 58 MG/DL
HGB BLD-MCNC: 11.8 G/DL (ref 11.1–15.9)
HGB UR QL STRIP: NEGATIVE
IMM GRANULOCYTES # BLD AUTO: 0 X10E3/UL (ref 0–0.1)
IMM GRANULOCYTES NFR BLD AUTO: 0 %
KETONES UR QL STRIP: NEGATIVE
LDLC SERPL CALC-MCNC: 73 MG/DL (ref 0–99)
LEUKOCYTE ESTERASE UR QL STRIP: NEGATIVE
LYMPHOCYTES # BLD AUTO: 0.9 X10E3/UL (ref 0.7–3.1)
LYMPHOCYTES NFR BLD AUTO: 30 %
MCH RBC QN AUTO: 31.7 PG (ref 26.6–33)
MCHC RBC AUTO-ENTMCNC: 33.1 G/DL (ref 31.5–35.7)
MCV RBC AUTO: 96 FL (ref 79–97)
MICRO URNS: NORMAL
MICRO URNS: NORMAL
MONOCYTES # BLD AUTO: 0.3 X10E3/UL (ref 0.1–0.9)
MONOCYTES NFR BLD AUTO: 10 %
NEUTROPHILS # BLD AUTO: 1.7 X10E3/UL (ref 1.4–7)
NEUTROPHILS NFR BLD AUTO: 55 %
NITRITE UR QL STRIP: NEGATIVE
PH UR STRIP: 7.5 [PH] (ref 5–7.5)
PLATELET # BLD AUTO: 223 X10E3/UL (ref 150–450)
POTASSIUM SERPL-SCNC: 4.5 MMOL/L (ref 3.5–5.2)
PROT SERPL-MCNC: 6.7 G/DL (ref 6–8.5)
PROT UR QL STRIP: NEGATIVE
RBC # BLD AUTO: 3.72 X10E6/UL (ref 3.77–5.28)
RBC #/AREA URNS HPF: NORMAL /HPF (ref 0–2)
SODIUM SERPL-SCNC: 141 MMOL/L (ref 134–144)
SP GR UR STRIP: 1.01 (ref 1–1.03)
T4 FREE SERPL-MCNC: 0.95 NG/DL (ref 0.82–1.77)
TRIGL SERPL-MCNC: 88 MG/DL (ref 0–149)
TSH SERPL DL<=0.005 MIU/L-ACNC: 5.5 UIU/ML (ref 0.45–4.5)
UROBILINOGEN UR STRIP-MCNC: 1 MG/DL (ref 0.2–1)
VLDLC SERPL CALC-MCNC: 17 MG/DL (ref 5–40)
WBC # BLD AUTO: 3.1 X10E3/UL (ref 3.4–10.8)
WBC #/AREA URNS HPF: NORMAL /HPF (ref 0–5)

## 2023-12-15 ENCOUNTER — OFFICE VISIT (OUTPATIENT)
Dept: INTERNAL MEDICINE | Facility: CLINIC | Age: 88
End: 2023-12-15
Payer: MEDICARE

## 2023-12-15 VITALS
HEART RATE: 85 BPM | HEIGHT: 65 IN | DIASTOLIC BLOOD PRESSURE: 80 MMHG | SYSTOLIC BLOOD PRESSURE: 110 MMHG | OXYGEN SATURATION: 98 % | BODY MASS INDEX: 23.32 KG/M2 | WEIGHT: 140 LBS

## 2023-12-15 DIAGNOSIS — Z00.00 WELL ADULT EXAM: ICD-10-CM

## 2023-12-15 DIAGNOSIS — I10 PRIMARY HYPERTENSION: ICD-10-CM

## 2023-12-15 DIAGNOSIS — Z00.00 MEDICARE ANNUAL WELLNESS VISIT, SUBSEQUENT: Primary | ICD-10-CM

## 2023-12-15 DIAGNOSIS — M81.8 OTHER OSTEOPOROSIS WITHOUT CURRENT PATHOLOGICAL FRACTURE: ICD-10-CM

## 2023-12-15 DIAGNOSIS — E78.5 HYPERLIPIDEMIA, UNSPECIFIED HYPERLIPIDEMIA TYPE: ICD-10-CM

## 2023-12-15 DIAGNOSIS — F32.A CHRONIC DEPRESSION: ICD-10-CM

## 2023-12-15 RX ORDER — ROSUVASTATIN CALCIUM 20 MG/1
20 TABLET, COATED ORAL DAILY
Start: 2023-12-15 | End: 2023-12-18 | Stop reason: SDUPTHER

## 2023-12-15 RX ORDER — CLOPIDOGREL BISULFATE 75 MG/1
75 TABLET ORAL DAILY
Start: 2023-12-15

## 2023-12-15 NOTE — PROGRESS NOTES
The ABCs of the Annual Wellness Visit  Subsequent Medicare Wellness Visit    Subjective    Cristela Peralta is a 89 y.o. female who presents for a Subsequent Medicare Wellness Visit.    The following portions of the patient's history were reviewed and   updated as appropriate: allergies, current medications, past family history, past medical history, past social history, past surgical history, and problem list.    Compared to one year ago, the patient feels her physical   health is the same.    Compared to one year ago, the patient feels her mental   health is the same.    Recent Hospitalizations:  She was not admitted to the hospital during the last year.       Current Medical Providers:  Patient Care Team:  Arelis Lara MD as PCP - General    Outpatient Medications Prior to Visit   Medication Sig Dispense Refill    aspirin 81 MG EC tablet Take 1 tablet by mouth Daily. 90 tablet 3    CALCIUM-MAGNESIUM-VITAMIN D PO Take  by mouth.      celecoxib (CeleBREX) 200 MG capsule Take 1 capsule by mouth Daily. 90 capsule 3    cetirizine (zyrTEC) 10 MG tablet Take 1 tablet by mouth Daily.      citalopram (CeleXA) 10 MG tablet Take 1 tablet by mouth Daily. 90 tablet 3    Coenzyme Q10 (COQ10 PO) Take  by mouth.      hydroCHLOROthiazide (HYDRODIURIL) 12.5 MG tablet Take 1 tablet by mouth Daily. 90 tablet 3    Multiple Vitamins-Minerals (CENTRUM SILVER) tablet Take 1 tablet by mouth Daily.      Omega-3 Fatty Acids (FISH OIL TRIPLE STRENGTH) 1400 MG capsule Take 1 capsule by mouth Daily.      prednisoLONE acetate (PRED FORTE) 1 % ophthalmic suspension Administer 1 drop into the left eye Daily.      vitamin C (ASCORBIC ACID) 500 MG tablet Take 1 tablet by mouth Daily.      rosuvastatin (Crestor) 10 MG tablet Take 1 tablet by mouth Daily. 90 tablet 3     No facility-administered medications prior to visit.       No opioid medication identified on active medication list. I have reviewed chart for other potential  high risk  "medication/s and harmful drug interactions in the elderly.        Aspirin is on active medication list. Aspirin use is indicated based on review of current medical condition/s. Pros and cons of this therapy have been discussed today. Benefits of this medication outweigh potential harm.  Patient has been encouraged to continue taking this medication.  .      Patient Active Problem List   Diagnosis    Diastolic dysfunction    Hyperlipidemia    Hypertension    Chronic low back pain    Osteoarthritis    Other osteoporosis without current pathological fracture    Vitamin D deficiency    Chronic depression    PVD (peripheral vascular disease)    Stenosis of right carotid artery     Advance Care Planning   Advance Care Planning     Advance Directive is on file.  ACP discussion was held with the patient during this visit. Patient has an advance directive in EMR which is still valid.      Objective    Vitals:    12/15/23 1407   BP: 110/80   Pulse: 85   SpO2: 98%   Weight: 63.5 kg (140 lb)   Height: 165.1 cm (65\")   PainSc: 0-No pain     Estimated body mass index is 23.3 kg/m² as calculated from the following:    Height as of this encounter: 165.1 cm (65\").    Weight as of this encounter: 63.5 kg (140 lb).    BMI is within normal parameters. No other follow-up for BMI required.      Does the patient have evidence of cognitive impairment? No    Lab Results   Component Value Date    CHLPL 148 2023    TRIG 88 2023    HDL 58 2023    LDL 73 2023    VLDL 17 2023        HEALTH RISK ASSESSMENT    Smoking Status:  Social History     Tobacco Use   Smoking Status Never    Passive exposure: Past   Smokeless Tobacco Never     Alcohol Consumption:  Social History     Substance and Sexual Activity   Alcohol Use No     Fall Risk Screen:    STEADI Fall Risk Assessment was completed, and patient is at MODERATE risk for falls. Assessment completed on:12/15/2023    Depression Screenin/15/2023     2:06 PM "   PHQ-2/PHQ-9 Depression Screening   Little Interest or Pleasure in Doing Things 0-->not at all   Feeling Down, Depressed or Hopeless 0-->not at all   PHQ-9: Brief Depression Severity Measure Score 0       Health Habits and Functional and Cognitive Screenin/15/2023     2:07 PM   Functional & Cognitive Status   Do you have difficulty preparing food and eating? No   Do you have difficulty bathing yourself, getting dressed or grooming yourself? No   Do you have difficulty using the toilet? No   Do you have difficulty moving around from place to place? No   Do you have trouble with steps or getting out of a bed or a chair? No   Current Diet Well Balanced Diet   Dental Exam Up to date   Eye Exam Up to date   Exercise (times per week) 3 times per week   Current Exercises Include Cardiovascular Workout   Do you need help using the phone?  No   Are you deaf or do you have serious difficulty hearing?  No   Do you need help to go to places out of walking distance? No   Do you need help shopping? No   Do you need help preparing meals?  No   Do you need help with housework?  No   Do you need help with laundry? No   Do you need help taking your medications? No   Do you need help managing money? No   Do you ever drive or ride in a car without wearing a seat belt? No   Have you felt unusual stress, anger or loneliness in the last month? No   Who do you live with? Alone   If you need help, do you have trouble finding someone available to you? No   Have you been bothered in the last four weeks by sexual problems? No   Do you have difficulty concentrating, remembering or making decisions? No       Age-appropriate Screening Schedule:  Refer to the list below for future screening recommendations based on patient's age, sex and/or medical conditions. Orders for these recommended tests are listed in the plan section. The patient has been provided with a written plan.    Health Maintenance   Topic Date Due    TDAP/TD VACCINES (2  "- Tdap) 01/01/2017    LIPID PANEL  12/11/2024    ANNUAL WELLNESS VISIT  12/15/2024    DXA SCAN  03/08/2025    COVID-19 Vaccine  Completed    INFLUENZA VACCINE  Completed    Pneumococcal Vaccine 65+  Completed    ZOSTER VACCINE  Completed                  CMS Preventative Services Quick Reference  Risk Factors Identified During Encounter  Fall Risk-High or Moderate: Discussed Fall Prevention in the home  The above risks/problems have been discussed with the patient.  Pertinent information has been shared with the patient in the After Visit Summary.  An After Visit Summary and PPPS were made available to the patient.    Follow Up:   Next Medicare Wellness visit to be scheduled in 1 year.       Additional E&M Note during same encounter follows:  Patient has multiple medical problems which are significant and separately identifiable that require additional work above and beyond the Medicare Wellness Visit.      Chief Complaint  Medicare Wellness-subsequent    Subjective        HPI  Cristela Peralta is also being seen today for CPE    HTN.  Control is good.  HLD.  LDL control is good but carotids have progressed.  Dr. Gibson requests statins to be doubled.    Chronic depression.  She feels well on Celexa.    OP.  She is maintained on calcium and D as well as Prolia.      Review of Systems   Respiratory: Negative.     Cardiovascular: Negative.        Objective   Vital Signs:  /80   Pulse 85   Ht 165.1 cm (65\")   Wt 63.5 kg (140 lb)   SpO2 98%   BMI 23.30 kg/m²     Physical Exam  Constitutional:       Appearance: She is well-developed.   HENT:      Head: Normocephalic and atraumatic.      Right Ear: Hearing and tympanic membrane normal.      Left Ear: Hearing and tympanic membrane normal.      Mouth/Throat:      Pharynx: No oropharyngeal exudate or posterior oropharyngeal erythema.   Cardiovascular:      Rate and Rhythm: Normal rate and regular rhythm.      Heart sounds: Normal heart sounds.   Pulmonary:      " Effort: Pulmonary effort is normal.      Breath sounds: Normal breath sounds.   Skin:     General: Skin is warm and dry.   Neurological:      Mental Status: She is alert and oriented to person, place, and time.   Psychiatric:         Behavior: Behavior normal.          The following data was reviewed by: Arelis Lara MD on 12/15/2023:  Common labs          6/7/2023    10:17 12/8/2023    11:40 12/11/2023    11:00   Common Labs   Glucose 79   96    BUN 20   14    Creatinine 0.68  0.90  0.75    Sodium 140   141    Potassium 4.3   4.5    Chloride 102   101    Calcium 9.2   9.1    Total Protein 6.5   6.7    Albumin 4.2   4.2    Total Bilirubin 0.3   0.4    Alkaline Phosphatase 108   87    AST (SGOT) 21   20    ALT (SGPT) 11   9    WBC 3.97   3.1    Hemoglobin 12.2   11.8    Hematocrit 36.2   35.6    Platelets 207   223    Total Cholesterol 150   148    Triglycerides 104   88    HDL Cholesterol 58   58    LDL Cholesterol  73   73                 Assessment and Plan   Diagnoses and all orders for this visit:    1. Medicare annual wellness visit, subsequent (Primary)    2. Well adult exam    3. Primary hypertension    4. Hyperlipidemia, unspecified hyperlipidemia type    5. Chronic depression    6. Other osteoporosis without current pathological fracture    Other orders  -     rosuvastatin (Crestor) 20 MG tablet; Take 1 tablet by mouth Daily.  -     clopidogrel (Plavix) 75 MG tablet; Take 1 tablet by mouth Daily.  -     COVID-19 F23 (Pfizer) 12yrs+ (COMIRNATY)    Discussed importance of preventative care including vaccinations, age appropriate cancer screening, routine lab work, healthy diet, and active lifestyle.  HTN.  Control is good.  The patient is advised to continue current dosage of HCTZ.  HLD.  LDL is good.  Optimize with increase of Crestor to 20 mg.    Chronic depression.  Control is good.  The patient is advised to continue current dosage of Celexa.    OP.  I recommend to get 1200 mg of calcium and 1000 IUs  of vitamin D through diet and supplements and to participate in a weight based exercise to prevent loss of bone mineral density. Bone mineral will be monitored every two years.  Continue Prolia.    PVD.  She is now on aspirin and Plavix.  Will optimize statin dose.           Follow Up   Return in about 1 year (around 12/15/2024).  Patient was given instructions and counseling regarding her condition or for health maintenance advice. Please see specific information pulled into the AVS if appropriate.

## 2023-12-15 NOTE — PATIENT INSTRUCTIONS
Medicare Wellness  Personal Prevention Plan of Service     Date of Office Visit:    Encounter Provider:  Arelis Lara MD  Place of Service:  [facility]  Patient Name: Cristela Peralta  :  1928    As part of the Medicare Wellness portion of your visit today, we are providing you with this personalized preventive plan of services (PPPS). This plan is based upon recommendations of the [place] Preventive Services Task Force (USPSTF) and the Advisory Committee on Immunization Practices (ACIP).    This lists the preventive care services that should be considered, and provides dates of when you are due. Items listed as completed are up-to-date and do not require any further intervention.    Health Maintenance   Topic Date Due    TDAP/TD VACCINES (2 - Tdap) 2017    COVID-19 Vaccine (2023- season) 2023    ANNUAL WELLNESS VISIT  2023    LIPID PANEL  2024    DXA SCAN  2025    INFLUENZA VACCINE  Completed    Pneumococcal Vaccine 65+  Completed    ZOSTER VACCINE  Completed       No orders of the defined types were placed in this encounter.      No follow-ups on file.        Fall Prevention in the Home, Adult  Falls can cause injuries and affect people of all ages. There are many simple things that you can do to make your home safe and to help prevent falls. Ask for help when making these changes, if needed.  What actions can I take to prevent falls?  General instructions  Use good lighting in all rooms. Replace any light bulbs that burn out, turn on lights if it is dark, and use night-lights.  Place frequently used items in easy-to-reach places. Lower the shelves around your home if necessary.  Set up furniture so that there are clear paths around it. Avoid moving your furniture around.  Remove throw rugs and other tripping hazards from the floor.  Avoid walking on wet floors.  Fix any uneven floor surfaces.  Add color or contrast paint or  tape to grab bars and handrails in your home. Place contrasting color strips on the first and last steps of staircases.  When you use a stepladder, make sure that it is completely opened and that the sides and supports are firmly locked. Have someone hold the ladder while you are using it. Do not climb a closed stepladder.  Know where your pets are when moving through your home.  What can I do in the bathroom?         Keep the floor dry. Immediately clean up any water that is on the floor.  Remove soap buildup in the tub or shower regularly.  Use nonskid mats or decals on the floor of the tub or shower.  Attach bath mats securely with double-sided, nonslip rug tape.  If you need to sit down while you are in the shower, use a plastic, nonslip stool.  Install grab bars by the toilet and in the tub and shower. Do not use towel bars as grab bars.  What can I do in the bedroom?  Make sure that a bedside light is easy to reach.  Do not use oversized bedding that reaches the floor.  Have a firm chair that has side arms to use for getting dressed.  What can I do in the kitchen?  Clean up any spills right away.  If you need to reach for something above you, use a sturdy step stool that has a grab bar.  Keep electrical cables out of the way.  Do not use floor polish or wax that makes floors slippery. If you must use wax, make sure that it is non-skid floor wax.  What can I do with my stairs?  Do not leave any items on the stairs.  Make sure that you have a light switch at the top and the bottom of the stairs. Have them installed if you do not have them.  Make sure that there are handrails on both sides of the stairs. Fix handrails that are broken or loose. Make sure that handrails are as long as the staircases.  Install non-slip stair treads on all stairs in your home.  Avoid having throw rugs at the top or bottom of stairs, or secure the rugs with carpet tape to prevent them from moving.  Choose a carpet design that does not  hide the edge of steps on the stairs.  Check any carpeting to make sure that it is firmly attached to the stairs. Fix any carpet that is loose or worn.  What can I do on the outside of my home?  Use bright outdoor lighting.  Regularly repair the edges of walkways and driveways and fix any cracks.  Remove high doorway thresholds.  Trim any shrubbery on the main path into your home.  Regularly check that handrails are securely fastened and in good repair. Both sides of all steps should have handrails.  Install guardrails along the edges of any raised decks or porches.  Clear walkways of debris and clutter, including tools and rocks.  Have leaves, snow, and ice cleared regularly.  Use sand or salt on walkways during winter months.  In the garage, clean up any spills right away, including grease or oil spills.  What other actions can I take?  Wear closed-toe shoes that fit well and support your feet. Wear shoes that have rubber soles or low heels.  Use mobility aids as needed, such as canes, walkers, scooters, and crutches.  Review your medicines with your health care provider. Some medicines can cause dizziness or changes in blood pressure, which increase your risk of falling.  Talk with your health care provider about other ways that you can decrease your risk of falls. This may include working with a physical therapist or  to improve your strength, balance, and endurance.  Where to find more information  Centers for Disease Control and Prevention, STEADI: www.cdc.gov  National [place] on Aging: www.josue.nih.gov  Contact a health care provider if:  You are afraid of falling at home.  You feel weak, drowsy, or dizzy at home.  You fall at home.  Summary  There are many simple things that you can do to make your home safe and to help prevent falls.  Ways to make your home safe include removing tripping hazards and installing grab bars in the bathroom.  Ask for help when making these changes in your home.  This  information is not intended to replace advice given to you by your health care provider. Make sure you discuss any questions you have with your health care provider.  Document Revised: 09/19/2022 Document Reviewed: 07/21/2021  Elsevier Patient Education © 2023 [employer] Inc[employer]

## 2023-12-16 PROBLEM — F32.A CHRONIC DEPRESSION: Status: ACTIVE | Noted: 2018-10-24

## 2023-12-16 PROBLEM — I65.21 STENOSIS OF RIGHT CAROTID ARTERY: Status: ACTIVE | Noted: 2023-12-16

## 2023-12-18 RX ORDER — HYDROCHLOROTHIAZIDE 12.5 MG/1
12.5 TABLET ORAL DAILY
Qty: 90 TABLET | Refills: 3 | Status: SHIPPED | OUTPATIENT
Start: 2023-12-18

## 2023-12-18 RX ORDER — ROSUVASTATIN CALCIUM 10 MG/1
10 TABLET, COATED ORAL DAILY
Qty: 90 TABLET | Refills: 3 | Status: CANCELLED | OUTPATIENT
Start: 2023-12-18

## 2023-12-18 RX ORDER — CITALOPRAM HYDROBROMIDE 10 MG/1
10 TABLET ORAL DAILY
Qty: 90 TABLET | Refills: 3 | Status: SHIPPED | OUTPATIENT
Start: 2023-12-18

## 2023-12-18 RX ORDER — ROSUVASTATIN CALCIUM 20 MG/1
20 TABLET, COATED ORAL DAILY
Start: 2023-12-18

## 2023-12-18 RX ORDER — CELECOXIB 200 MG/1
200 CAPSULE ORAL DAILY
Qty: 90 CAPSULE | Refills: 3 | Status: SHIPPED | OUTPATIENT
Start: 2023-12-18

## 2024-01-10 ENCOUNTER — HOSPITAL ENCOUNTER (INPATIENT)
Facility: HOSPITAL | Age: 89
LOS: 3 days | Discharge: HOME | DRG: 378 | End: 2024-01-13
Attending: EMERGENCY MEDICINE | Admitting: STUDENT IN AN ORGANIZED HEALTH CARE EDUCATION/TRAINING PROGRAM
Payer: MEDICARE

## 2024-01-10 ENCOUNTER — APPOINTMENT (OUTPATIENT)
Dept: CT IMAGING | Facility: HOSPITAL | Age: 89
DRG: 378 | End: 2024-01-10
Payer: MEDICARE

## 2024-01-10 DIAGNOSIS — K92.2 ACUTE LOWER GI BLEEDING: Primary | ICD-10-CM

## 2024-01-10 DIAGNOSIS — Z79.01 ANTICOAGULATED: ICD-10-CM

## 2024-01-10 LAB
ABO GROUP BLD: NORMAL
ALBUMIN SERPL-MCNC: 4.3 G/DL (ref 3.5–5.2)
ALBUMIN/GLOB SERPL: 1.9 G/DL
ALP SERPL-CCNC: 96 U/L (ref 39–117)
ALT SERPL W P-5'-P-CCNC: 10 U/L (ref 1–33)
ANION GAP SERPL CALCULATED.3IONS-SCNC: 12 MMOL/L (ref 5–15)
APTT PPP: 25.2 SECONDS (ref 22.7–35.4)
AST SERPL-CCNC: 17 U/L (ref 1–32)
BASOPHILS # BLD AUTO: 0.03 10*3/MM3 (ref 0–0.2)
BASOPHILS NFR BLD AUTO: 0.8 % (ref 0–1.5)
BILIRUB SERPL-MCNC: 0.3 MG/DL (ref 0–1.2)
BLD GP AB SCN SERPL QL: NEGATIVE
BUN SERPL-MCNC: 17 MG/DL (ref 8–23)
BUN/CREAT SERPL: 21 (ref 7–25)
CALCIUM SPEC-SCNC: 9.5 MG/DL (ref 8.2–9.6)
CHLORIDE SERPL-SCNC: 100 MMOL/L (ref 98–107)
CO2 SERPL-SCNC: 27 MMOL/L (ref 22–29)
CREAT SERPL-MCNC: 0.81 MG/DL (ref 0.57–1)
DEPRECATED RDW RBC AUTO: 42.6 FL (ref 37–54)
EGFRCR SERPLBLD CKD-EPI 2021: 66.9 ML/MIN/1.73
EOSINOPHIL # BLD AUTO: 0.13 10*3/MM3 (ref 0–0.4)
EOSINOPHIL NFR BLD AUTO: 3.3 % (ref 0.3–6.2)
ERYTHROCYTE [DISTWIDTH] IN BLOOD BY AUTOMATED COUNT: 12.2 % (ref 12.3–15.4)
GLOBULIN UR ELPH-MCNC: 2.3 GM/DL
GLUCOSE SERPL-MCNC: 108 MG/DL (ref 65–99)
HCT VFR BLD AUTO: 35.9 % (ref 34–46.6)
HGB BLD-MCNC: 11.6 G/DL (ref 12–15.9)
HOLD SPECIMEN: NORMAL
HOLD SPECIMEN: NORMAL
IMM GRANULOCYTES # BLD AUTO: 0.01 10*3/MM3 (ref 0–0.05)
IMM GRANULOCYTES NFR BLD AUTO: 0.3 % (ref 0–0.5)
INR PPP: 0.99 (ref 0.9–1.1)
LYMPHOCYTES # BLD AUTO: 1.37 10*3/MM3 (ref 0.7–3.1)
LYMPHOCYTES NFR BLD AUTO: 34.8 % (ref 19.6–45.3)
MCH RBC QN AUTO: 30.9 PG (ref 26.6–33)
MCHC RBC AUTO-ENTMCNC: 32.3 G/DL (ref 31.5–35.7)
MCV RBC AUTO: 95.5 FL (ref 79–97)
MONOCYTES # BLD AUTO: 0.4 10*3/MM3 (ref 0.1–0.9)
MONOCYTES NFR BLD AUTO: 10.2 % (ref 5–12)
NEUTROPHILS NFR BLD AUTO: 2 10*3/MM3 (ref 1.7–7)
NEUTROPHILS NFR BLD AUTO: 50.6 % (ref 42.7–76)
NRBC BLD AUTO-RTO: 0 /100 WBC (ref 0–0.2)
PLATELET # BLD AUTO: 225 10*3/MM3 (ref 140–450)
PMV BLD AUTO: 9.7 FL (ref 6–12)
POTASSIUM SERPL-SCNC: 3.9 MMOL/L (ref 3.5–5.2)
PROT SERPL-MCNC: 6.6 G/DL (ref 6–8.5)
PROTHROMBIN TIME: 13.2 SECONDS (ref 11.7–14.2)
RBC # BLD AUTO: 3.76 10*6/MM3 (ref 3.77–5.28)
RH BLD: POSITIVE
SODIUM SERPL-SCNC: 139 MMOL/L (ref 136–145)
T&S EXPIRATION DATE: NORMAL
WBC NRBC COR # BLD AUTO: 3.94 10*3/MM3 (ref 3.4–10.8)
WHOLE BLOOD HOLD COAG: NORMAL
WHOLE BLOOD HOLD SPECIMEN: NORMAL

## 2024-01-10 PROCEDURE — 86900 BLOOD TYPING SEROLOGIC ABO: CPT

## 2024-01-10 PROCEDURE — 99285 EMERGENCY DEPT VISIT HI MDM: CPT

## 2024-01-10 PROCEDURE — 85025 COMPLETE CBC W/AUTO DIFF WBC: CPT

## 2024-01-10 PROCEDURE — 85730 THROMBOPLASTIN TIME PARTIAL: CPT | Performed by: EMERGENCY MEDICINE

## 2024-01-10 PROCEDURE — 85610 PROTHROMBIN TIME: CPT | Performed by: EMERGENCY MEDICINE

## 2024-01-10 PROCEDURE — 25010000002 ONDANSETRON PER 1 MG: Performed by: EMERGENCY MEDICINE

## 2024-01-10 PROCEDURE — 86850 RBC ANTIBODY SCREEN: CPT

## 2024-01-10 PROCEDURE — 25510000001 IOPAMIDOL 61 % SOLUTION: Performed by: EMERGENCY MEDICINE

## 2024-01-10 PROCEDURE — 80053 COMPREHEN METABOLIC PANEL: CPT

## 2024-01-10 PROCEDURE — 86901 BLOOD TYPING SEROLOGIC RH(D): CPT

## 2024-01-10 PROCEDURE — 74177 CT ABD & PELVIS W/CONTRAST: CPT

## 2024-01-10 RX ORDER — ONDANSETRON 2 MG/ML
4 INJECTION INTRAMUSCULAR; INTRAVENOUS ONCE
Status: COMPLETED | OUTPATIENT
Start: 2024-01-10 | End: 2024-01-10

## 2024-01-10 RX ORDER — SODIUM CHLORIDE 0.9 % (FLUSH) 0.9 %
10 SYRINGE (ML) INJECTION AS NEEDED
Status: DISCONTINUED | OUTPATIENT
Start: 2024-01-10 | End: 2024-01-13 | Stop reason: HOSPADM

## 2024-01-10 RX ORDER — PANTOPRAZOLE SODIUM 40 MG/10ML
80 INJECTION, POWDER, LYOPHILIZED, FOR SOLUTION INTRAVENOUS ONCE
Status: COMPLETED | OUTPATIENT
Start: 2024-01-10 | End: 2024-01-10

## 2024-01-10 RX ORDER — PANTOPRAZOLE SODIUM 40 MG/10ML
40 INJECTION, POWDER, LYOPHILIZED, FOR SOLUTION INTRAVENOUS ONCE
Status: COMPLETED | OUTPATIENT
Start: 2024-01-10 | End: 2024-01-10

## 2024-01-10 RX ORDER — CITALOPRAM HYDROBROMIDE 10 MG/1
10 TABLET ORAL DAILY
Status: DISCONTINUED | OUTPATIENT
Start: 2024-01-11 | End: 2024-01-13 | Stop reason: HOSPADM

## 2024-01-10 RX ORDER — ROSUVASTATIN CALCIUM 20 MG/1
20 TABLET, COATED ORAL DAILY
Status: DISCONTINUED | OUTPATIENT
Start: 2024-01-11 | End: 2024-01-13 | Stop reason: HOSPADM

## 2024-01-10 RX ADMIN — ONDANSETRON HYDROCHLORIDE 4 MG: 2 INJECTION, SOLUTION INTRAMUSCULAR; INTRAVENOUS at 14:04

## 2024-01-10 RX ADMIN — SODIUM CHLORIDE 40 MG: 9 INJECTION, SOLUTION INTRAVENOUS at 17:56

## 2024-01-10 RX ADMIN — PANTOPRAZOLE SODIUM 80 MG: 40 INJECTION, POWDER, FOR SOLUTION INTRAVENOUS at 17:56

## 2024-01-10 RX ADMIN — IOPAMIDOL 85 ML: 612 INJECTION, SOLUTION INTRAVENOUS at 14:59

## 2024-01-10 RX ADMIN — PANTOPRAZOLE SODIUM 40 MG: 40 INJECTION, POWDER, FOR SOLUTION INTRAVENOUS at 14:02

## 2024-01-10 RX ADMIN — SODIUM CHLORIDE 40 MG: 9 INJECTION, SOLUTION INTRAVENOUS at 22:03

## 2024-01-10 NOTE — ED NOTES
Nursing report ED to floor  Cristela Peralta  89 y.o.  female    HPI :   Chief Complaint   Patient presents with    Rectal Bleeding       Admitting doctor:   Elia Almanzar MD    Admitting diagnosis:   The primary encounter diagnosis was Acute lower GI bleeding. A diagnosis of Anticoagulated was also pertinent to this visit.    Code status:   Current Code Status       Date Active Code Status Order ID Comments User Context       Prior            Allergies:   Neomycin-bacitracin zn-polymyx, Atorvastatin, Pravastatin, Alendronate, and Sulfa antibiotics    Isolation:   No active isolations    Intake and Output  No intake or output data in the 24 hours ending 01/10/24 1554    Weight:       01/10/24  1326   Weight: 63.5 kg (140 lb)       Most recent vitals:   Vitals:    01/10/24 1547 01/10/24 1548 01/10/24 1552 01/10/24 1553   BP:       BP Location:       Patient Position:       Pulse: 73 70 73 75   Resp:       Temp:       TempSrc:       SpO2: 100% 97% 99% 93%   Weight:       Height:           Active LDAs/IV Access:   Lines, Drains & Airways       Active LDAs       Name Placement date Placement time Site Days    Peripheral IV 01/10/24 1330 Right Antecubital 01/10/24  1330  Antecubital  less than 1                    Labs (abnormal labs have a star):   Labs Reviewed   COMPREHENSIVE METABOLIC PANEL - Abnormal; Notable for the following components:       Result Value    Glucose 108 (*)     All other components within normal limits    Narrative:     GFR Normal >60  Chronic Kidney Disease <60  Kidney Failure <15    The GFR formula is only valid for adults with stable renal function between ages 18 and 70.   CBC WITH AUTO DIFFERENTIAL - Abnormal; Notable for the following components:    RBC 3.76 (*)     Hemoglobin 11.6 (*)     RDW 12.2 (*)     All other components within normal limits   PROTIME-INR - Normal   APTT - Normal   RAINBOW DRAW    Narrative:     The following orders were created for panel order [place] Draw.  Procedure                                Abnormality         Status                     ---------                               -----------         ------                     Green Top (Gel)[[id number]]                                  Final result               Lavender Top[[id number]]                                     Final result               Gold Top - SST[[id number]]                                   Final result               Light Blue Top[[id number]]                                   Final result                 Please view results for these tests on the individual orders.   OCCULT BLOOD X 1, STOOL   POCT OCCULT BLOOD STOOL (ED ONLY)   TYPE AND SCREEN   CBC AND DIFFERENTIAL    Narrative:     The following orders were created for panel order CBC & Differential.  Procedure                               Abnormality         Status                     ---------                               -----------         ------                     CBC Auto Differential[[id number]]        Abnormal            Final result                 Please view results for these tests on the individual orders.   GREEN TOP   LAVENDER TOP   GOLD TOP - SST   LIGHT BLUE TOP       EKG:   No orders to display       Meds given in ED:   Medications   sodium chloride 0.9 % flush 10 mL (has no administration in time range)   sodium chloride 0.9 % flush 10 mL (has no administration in time range)   pantoprazole (PROTONIX) injection 40 mg (40 mg Intravenous Given 1/10/24 1402)   ondansetron (ZOFRAN) injection 4 mg (4 mg Intravenous Given 1/10/24 1404)   iopamidol (ISOVUE-300) 61 % injection 100 mL (85 mL Intravenous Given by Other 1/10/24 1459)       Imaging results:  CT Abdomen Pelvis With Contrast    Result Date: 1/10/2024  1. Within the left aspect of a redundant sigmoid colon in the left lower pelvis there is increased density material potentially representing a small area of active hemorrhage into the colon though there is no further evidence to suggest  hemorrhage (image 103). No noncontrasted imaging was performed to confirm this dense material was not pre-existing. There is extensive diverticulosis greatest involving the sigmoid colon. No evidence for bowel obstruction. 2. Cholelithiasis. 3. Atherosclerotic disease. Moderate celiac and SMA origin stenoses.  Discussed with Dr. Kearns in the emergency department on 01/10/2024 at 3:30 p.m.  Radiation dose reduction techniques were utilized, including automated exposure control and exposure modulation based on body size.        Ambulatory status:   ax2    Social issues:   Social History     Socioeconomic History    Marital status:    Tobacco Use    Smoking status: Never     Passive exposure: Past    Smokeless tobacco: Never   Vaping Use    Vaping Use: Never used   Substance and Sexual Activity    Alcohol use: No    Drug use: Never    Sexual activity: Not Currently     Partners: Male     Birth control/protection: Post-menopausal       NIH Stroke Scale:       Registered Nurse, RN  01/10/24 15:54 EST

## 2024-01-10 NOTE — H&P
Patient Name:  Cristela Peralta  YOB: 1928  MRN:  [id number]  Admit Date:  1/10/2024  Patient Care Team:  Arelis Lara MD as PCP - General      Subjective   History Present Illness     Chief Complaint   Patient presents with    Rectal Bleeding     This is a 89-year-old woman with past medical history of hypertension, hyperlipidemia, peripheral vascular disease followed by Dr. De La Cruz on Plavix who presents to the hospital with lower GI bleeding.  Started Plavix 6 days ago and has not taken for the last 2 days.  She reported that she had a bowel movement and passed some brown stool but then started to pass gross blood with blood clots.  Upon presentation to the emergency department she had a stable hemoglobin level of 11.6.  CT of the abdomen pelvis was obtained which showed a increased density material within the aspect of the sigmoid colon which could be active hemorrhage.  She was admitted for evaluation and management.    Personal History     Past Medical History:   Diagnosis Date    Cataract     Degeneration of cervical intervertebral disc     Depression     Diverticulosis     GI bleed     H/O bone density study 12/11/2015    History of depression     History of diastolic dysfunction     History of diverticulosis     History of EKG 04/29/2015    History of herpes genitalis     History of mammogram 12/11/2015    History of medical problems     PVD, talia    History of spinal stenosis     History of vertigo     HL (hearing loss)     Hyperlipidemia     Hypertension     Osteoarthritis     Osteopenia     Osteoporosis     Visual impairment      Past Surgical History:   Procedure Laterality Date    APPENDECTOMY      COLONOSCOPY N/A 01/02/2020    Procedure: COLONOSCOPY;  Surgeon: Tung Muir MD;  Location: [facility] ENDOSCOPY;  Service: Gastroenterology    ENDOSCOPY N/A 01/01/2020    Procedure: ESOPHAGOGASTRODUODENOSCOPY;  Surgeon: Yanet Obregon MD;  Location: [facility] ENDOSCOPY;   Service: Gastroenterology    HYSTERECTOMY      TOTAL    JOINT REPLACEMENT      KNEE SURGERY      Replacement    TOTAL ABDOMINAL HYSTERECTOMY WITH SALPINGO OOPHORECTOMY       Family History   Problem Relation Age of Onset    Aneurysm Father     Heart disease Father     Colon cancer Sister     Throat cancer Sister      Social History     Tobacco Use    Smoking status: Never     Passive exposure: Past    Smokeless tobacco: Never   Vaping Use    Vaping Use: Never used   Substance Use Topics    Alcohol use: No    Drug use: Never     No current facility-administered medications on file prior to encounter.     Current Outpatient Medications on File Prior to Encounter   Medication Sig Dispense Refill    aspirin 81 MG EC tablet Take 1 tablet by mouth Daily. 90 tablet 3    CALCIUM-MAGNESIUM-VITAMIN D PO Take  by mouth Daily.      celecoxib (CeleBREX) 200 MG capsule TAKE 1 CAPSULE EVERY DAY 90 capsule 3    cetirizine (zyrTEC) 10 MG tablet Take 1 tablet by mouth Daily.      citalopram (CeleXA) 10 MG tablet TAKE 1 TABLET EVERY DAY 90 tablet 3    clopidogrel (Plavix) 75 MG tablet Take 1 tablet by mouth Daily.      Coenzyme Q10 (COQ10 PO) Take  by mouth Daily.      hydroCHLOROthiazide (HYDRODIURIL) 12.5 MG tablet TAKE 1 TABLET EVERY DAY 90 tablet 3    Multiple Vitamins-Minerals (CENTRUM SILVER) tablet Take 1 tablet by mouth Daily.      Omega-3 Fatty Acids (FISH OIL TRIPLE STRENGTH) 1400 MG capsule Take 1 capsule by mouth Daily.      prednisoLONE acetate (PRED FORTE) 1 % ophthalmic suspension Administer 1 drop into the left eye Daily.      rosuvastatin (Crestor) 20 MG tablet Take 1 tablet by mouth Daily.      vitamin C (ASCORBIC ACID) 500 MG tablet Take 1 tablet by mouth Daily.       Allergies   Allergen Reactions    Neomycin-Bacitracin Zn-Polymyx Rash    Atorvastatin Other (See Comments)     LEG CRAMPS    Pravastatin Other (See Comments)     LEG CRAMPS    Alendronate GI Intolerance    Sulfa Antibiotics Hives and Rash        Objective    Objective     Vital Signs  Temp:  [97.9 °F (36.6 °C)-98.5 °F (36.9 °C)] 98 °F (36.7 °C)  Heart Rate:  [] 79  Resp:  [16-18] 18  BP: (127-177)/(62-88) 175/75  SpO2:  [93 %-100 %] 97 %  on   ;   Device (Oxygen Therapy): room air  Body mass index is 22.6 kg/m².    Physical Exam  Constitutional:       General: She is not in acute distress.  HENT:      Head: Normocephalic and atraumatic.   Eyes:      Extraocular Movements: Extraocular movements intact.      Pupils: Pupils are equal, round, and reactive to light.   Cardiovascular:      Rate and Rhythm: Normal rate and regular rhythm.   Pulmonary:      Effort: Pulmonary effort is normal. No respiratory distress.   Abdominal:      General: There is no distension.      Palpations: Abdomen is soft.      Tenderness: There is no abdominal tenderness.   Skin:     General: Skin is warm and dry.   Neurological:      General: No focal deficit present.      Mental Status: She is alert and oriented to person, place, and time.         Results Review:  I reviewed the patient's new clinical results.  I reviewed the patient's new imaging results and agree with the interpretation.  I reviewed the patient's other test results and agree with the interpretation  I personally viewed and interpreted the patient's EKG/Telemetry data  Discussed with ED provider.    Lab Results (last 24 hours)       Procedure Component Value Units Date/Time    CBC & Differential [[id number]]  (Abnormal) Collected: 01/10/24 1331    Specimen: Blood Updated: 01/10/24 1344    Narrative:      The following orders were created for panel order CBC & Differential.  Procedure                               Abnormality         Status                     ---------                               -----------         ------                     CBC Auto Differential[[id number]]        Abnormal            Final result                 Please view results for these tests on the individual orders.     Comprehensive Metabolic Panel [[id number]]  (Abnormal) Collected: 01/10/24 1331    Specimen: Blood Updated: 01/10/24 1404     Glucose 108 mg/dL      BUN 17 mg/dL      Creatinine 0.81 mg/dL      Sodium 139 mmol/L      Potassium 3.9 mmol/L      Chloride 100 mmol/L      CO2 27.0 mmol/L      Calcium 9.5 mg/dL      Total Protein 6.6 g/dL      Albumin 4.3 g/dL      ALT (SGPT) 10 U/L      AST (SGOT) 17 U/L      Alkaline Phosphatase 96 U/L      Total Bilirubin 0.3 mg/dL      Globulin 2.3 gm/dL      A/G Ratio 1.9 g/dL      BUN/Creatinine Ratio 21.0     Anion Gap 12.0 mmol/L      eGFR 66.9 mL/min/1.73     Narrative:      GFR Normal >60  Chronic Kidney Disease <60  Kidney Failure <15    The GFR formula is only valid for adults with stable renal function between ages 18 and 70.    CBC Auto Differential [[id number]]  (Abnormal) Collected: 01/10/24 1331    Specimen: Blood Updated: 01/10/24 1344     WBC 3.94 10*3/mm3      RBC 3.76 10*6/mm3      Hemoglobin 11.6 g/dL      Hematocrit 35.9 %      MCV 95.5 fL      MCH 30.9 pg      MCHC 32.3 g/dL      RDW 12.2 %      RDW-SD 42.6 fl      MPV 9.7 fL      Platelets 225 10*3/mm3      Neutrophil % 50.6 %      Lymphocyte % 34.8 %      Monocyte % 10.2 %      Eosinophil % 3.3 %      Basophil % 0.8 %      Immature Grans % 0.3 %      Neutrophils, Absolute 2.00 10*3/mm3      Lymphocytes, Absolute 1.37 10*3/mm3      Monocytes, Absolute 0.40 10*3/mm3      Eosinophils, Absolute 0.13 10*3/mm3      Basophils, Absolute 0.03 10*3/mm3      Immature Grans, Absolute 0.01 10*3/mm3      nRBC 0.0 /100 WBC     Protime-INR [[id number]]  (Normal) Collected: 01/10/24 1331    Specimen: Blood Updated: 01/10/24 1408     Protime 13.2 Seconds      INR 0.99    aPTT [[id number]]  (Normal) Collected: 01/10/24 1331    Specimen: Blood Updated: 01/10/24 1408     PTT 25.2 seconds             No results found for this or any previous visit.    Imaging Results (Last 24 Hours)       Procedure Component Value Units Date/Time    CT  Abdomen Pelvis With Contrast [[id number]] Collected: 01/10/24 1532     Updated: 01/10/24 1636    Narrative:      CT ABDOMEN AND PELVIS WITH IV CONTRAST     HISTORY: Rectal bleeding. Bloody stools since yesterday.     TECHNIQUE:  CT includes axial imaging from the lung bases to the  trochanters with intravenous contrast and without use of oral contrast.  Data reconstructed in coronal and sagittal planes. Radiation dose  reduction techniques were utilized, including automated exposure control  and exposure modulation based on body size.     COMPARISON: CT angiogram abdomen and pelvis 04/24/2023.     FINDINGS: Calcified nodule is present in the left lower lobe. Liver,  spleen, adrenal glands, pancreas appear within normal limits. There is a  9 mm posterior right renal low-density lesion which is most likely a  cyst. There is no renal stone or evidence for obstructive uropathy.  Small gallstones are present. There is diffuse atherosclerotic disease  and there appear to be moderate stenoses involving the origins of the  celiac and superior mesenteric arteries.     There is colonic diverticulosis which is severe involving the sigmoid  colon. There is no evidence for diverticulitis. Within the redundant  sigmoid colon within the left lower pelvis there is dense material  within the lumen of the sigmoid colon as demonstrated on axial image  103. No other dense material is present in the colon and this could  represent a small area of active extravasation or hemorrhage into the  colon. There is no hematoma or fluid distention of the colon or further  evidence for hemorrhage.     The bones are osteopenic. There is a scoliotic curvature of the lumbar  spine associated with multilevel degenerative disc disease and facet  arthritis.       Impression:      1. Within the left aspect of a redundant sigmoid colon in the left lower  pelvis there is increased density material potentially representing a  small area of active hemorrhage  into the colon though there is no  further evidence to suggest hemorrhage (image 103). No noncontrasted  imaging was performed to confirm this dense material was pre-existing.  There is extensive diverticulosis greatest involving the sigmoid colon.  No evidence for bowel obstruction.  2. Cholelithiasis.  3. Atherosclerotic disease. Moderate celiac and SMA origin stenoses.     Discussed with Dr. Kearns in the emergency department on 01/10/2024 at  3:30 p.m.      Radiation dose reduction techniques were utilized, including automated  exposure control and exposure modulation based on body size.        This report was finalized on 1/10/2024 4:33 PM by Dr. Sylvain Arizmendi M.D on Workstation: [id number]                   SCANNED - TELEMETRY     Final Result                 Assessment/Plan     Active Hospital Problems    Diagnosis  POA    **Acute lower GI bleeding [K92.2]  Yes    Stenosis of right carotid artery [I65.21]  Yes    PVD (peripheral vascular disease) [I73.9]  Yes    Hyperlipidemia [E78.5]  Yes    Hypertension [I10]  Yes      Resolved Hospital Problems   No resolved problems to display.     Acute GI bleed  Has been given pantoprazole IV x 1 in the emergency department.  Started on PPI ggt  GI consult  Monitor hemoglobin every 8 hours  Hold NSAIDs, aspirin, Plavix    Peripheral vascular disease  Right carotid artery stenosis  Hold aspirin and Plavix for now.  Last dose was on 1/9  She would like to speak to her vascular surgeon, Dr. De La Cruz regarding discontinuation of Plavix.    Hypertension  Hold off on hydrochlorothiazide due to active GI bleed      I discussed the patient's findings and my recommendations with patient, family, and ED provider.    VTE Prophylaxis - SCDs.  Code Status - Full code.       Elia Almanzar MD  [facility]  01/10/24  21:26 EST

## 2024-01-10 NOTE — PLAN OF CARE
Problem: Adult Inpatient Plan of Care  Goal: Plan of Care Review  Outcome: Ongoing, Progressing  Flowsheets (Taken 1/10/2024 1829)  Progress: improving  Plan of Care Reviewed With: patient  Outcome Evaluation: Patient has been pleasant and cooperative during shift. Arrived on [facility] at 1800. No complaints of pain nausea or SOA. BM 1/10. AOx4, assist x1, room air, SR. Daughter and son at bedside. NPO. Full code. Will continue to monitor and assist patient as needed.  Goal: Patient-Specific Goal (Individualized)  Outcome: Ongoing, Progressing  Goal: Absence of Hospital-Acquired Illness or Injury  Outcome: Ongoing, Progressing  Goal: Optimal Comfort and Wellbeing  Outcome: Ongoing, Progressing  Goal: Readiness for Transition of Care  Outcome: Ongoing, Progressing  Intervention: Mutually Develop Transition Plan  Recent Flowsheet Documentation  Taken 1/10/2024 1827 by Rafael Aldridge, RN  Transportation Anticipated: family or friend will provide  Patient/Family Anticipated Services at Transition: none  Patient/Family Anticipates Transition to: home  Taken 1/10/2024 1822 by Rafael Aldridge, RN  Equipment Currently Used at Home:   grab bar   bath bench     Problem: Skin Injury Risk Increased  Goal: Skin Health and Integrity  Outcome: Ongoing, Progressing   Goal Outcome Evaluation:  Plan of Care Reviewed With: patient        Progress: improving  Outcome Evaluation: Patient has been pleasant and cooperative during shift. Arrived on [facility] at 1800. No complaints of pain nausea or SOA. BM 1/10. AOx4, assist x1, room air, SR. Daughter and son at bedside. NPO. Full code. Will continue to monitor and assist patient as needed.

## 2024-01-10 NOTE — ED NOTES
Patient c/o blood in stool x3 today. She reports that it is bright red and dark with small clots.

## 2024-01-10 NOTE — ED PROVIDER NOTES
" EMERGENCY DEPARTMENT ENCOUNTER    Room Number:  E651/1  PCP: Arelis Lara MD  Independent Historians: Patient    HPI:  Chief Complaint: had concerns including Rectal Bleeding.  And new medication    A complete HPI/ROS/PMH/PSH/SH/FH are unobtainable due to: None    Chronic or social conditions impacting patient care (Social Determinants of Health): None      Context: Cristela Peralta is a 89 y.o. female with a medical history of hypertension, hyperlipidemia, chronic back pain, and peripheral vascular disease followed by Dr. Gibson who presents to the ED c/o acute lower GI bleeding.  Patient says her stomach just did not feel right\" yesterday.  She had decreased p.o. intake including no dinner because she did not feel well.  However, she denies any specific abdominal pain or cramping, but pain, nausea, or diarrhea.  Today patient had the urge to have a bowel movement went to the bathroom and passed some brown stool but then started to pass gross blood with blood clots.  She says the toilet water was dyed red with blood and she noted bright red blood and dark blood with blood clots on the toilet paper as well.  Patient denies any abdominal pain, back pain, lightheadedness or syncope.  Patient denies any rectal pain with the bowel movements.  Patient does remark that she just started Plavix per her vascular surgeon to help with \"circulation to my legs\".  She started this medication on January 4.  She skipped her dose yesterday because her stomach felt unwell.  She is also missed her dose today due to the bleeding.  After her initial bloody bowel movement she has had 2 additional bloody bowel movements again filling the toilet bowl with bloody stool and blood clots and dying the toilet water red.  She denies any abdominal pain currently.  She denies any fevers, bad foods, sick contacts.        Review of prior external notes (non-ED) -and- Review of prior external test results outside of this encounter: On record " review patient was admitted January 2020 with acute GI bleeding at that time.  Patient was not on Plavix then but was on aspirin and had been taking NSAIDs for back pain.  At that time she had a few small bowel movements with small streaks of pink-tinged blood on the toilet paper and then developed blood clots.  Patient had hemoglobin that was down to 10.4 from 12.5 prior to that.  Patient did have a colonoscopy planned inpatient but it was incomplete and unsuccessful due to tortuous colon.  Patient's hemoglobin dropped to 8.9 but remained stable there and her bleeding ceased.    Prescription drug monitoring program review:         PAST MEDICAL HISTORY  Active Ambulatory Problems     Diagnosis Date Noted    Diastolic dysfunction 05/13/2016    Hyperlipidemia 05/13/2016    Hypertension 05/13/2016    Chronic low back pain 05/13/2016    Osteoarthritis 05/13/2016    Other osteoporosis without current pathological fracture 05/13/2016    Vitamin D deficiency 10/14/2016    Chronic depression 10/24/2018    PVD (peripheral vascular disease) 06/24/2019    Stenosis of right carotid artery 12/16/2023     Resolved Ambulatory Problems     Diagnosis Date Noted    Depression 05/13/2016    Gastrointestinal hemorrhage with melena 12/30/2019    Acute posthemorrhagic anemia 01/01/2020    Lower GI bleed 12/30/2019     Past Medical History:   Diagnosis Date    Cataract     Degeneration of cervical intervertebral disc     Diverticulosis     GI bleed     H/O bone density study 12/11/2015    History of depression     History of diastolic dysfunction     History of diverticulosis     History of EKG 04/29/2015    History of herpes genitalis     History of mammogram 12/11/2015    History of medical problems     History of spinal stenosis     History of vertigo     HL (hearing loss)     Osteopenia     Osteoporosis     Visual impairment          PAST SURGICAL HISTORY  Past Surgical History:   Procedure Laterality Date    APPENDECTOMY       COLONOSCOPY N/A 01/02/2020    Procedure: COLONOSCOPY;  Surgeon: Tung Muir MD;  Location: [facility] ENDOSCOPY;  Service: Gastroenterology    ENDOSCOPY N/A 01/01/2020    Procedure: ESOPHAGOGASTRODUODENOSCOPY;  Surgeon: Yanet Obregon MD;  Location: [facility] ENDOSCOPY;  Service: Gastroenterology    HYSTERECTOMY      TOTAL    JOINT REPLACEMENT      KNEE SURGERY      Replacement    TOTAL ABDOMINAL HYSTERECTOMY WITH SALPINGO OOPHORECTOMY           FAMILY HISTORY  Family History   Problem Relation Age of Onset    Aneurysm Father     Heart disease Father     Colon cancer Sister     Throat cancer Sister          SOCIAL HISTORY  Social History     Socioeconomic History    Marital status:    Tobacco Use    Smoking status: Never     Passive exposure: Past    Smokeless tobacco: Never   Vaping Use    Vaping Use: Never used   Substance and Sexual Activity    Alcohol use: No    Drug use: Never    Sexual activity: Not Currently     Partners: Male     Birth control/protection: Post-menopausal         ALLERGIES  Neomycin-bacitracin zn-polymyx, Atorvastatin, Pravastatin, Alendronate, and Sulfa antibiotics        REVIEW OF SYSTEMS  Review of Systems  Included in HPI  All systems reviewed and negative except for those discussed in HPI.      PHYSICAL EXAM    I have reviewed the triage vital signs and nursing notes.    ED Triage Vitals   Temp Heart Rate Resp BP SpO2   01/10/24 1319 01/10/24 1319 01/10/24 1319 01/10/24 1326 01/10/24 1319   98.5 °F (36.9 °C) 111 16 156/88 96 %      Temp src Heart Rate Source Patient Position BP Location FiO2 (%)   01/10/24 1319 01/10/24 1319 01/10/24 1326 01/10/24 1326 --   Tympanic Monitor Lying Left arm        Physical Exam  GENERAL: Pleasant cooperative and conversant female, alert, no acute distress  SKIN: Warm, dry  HENT: Normocephalic, atraumatic  EYES: no scleral icterus  CV: regular rhythm, regular rate  RESPIRATORY: normal effort, lungs clear  ABDOMEN: soft, nontender,  nondistended, no rebound, no guarding  MUSCULOSKELETAL: no deformity  NEURO: alert, moves all extremities, follows commands      NIH:                                                             LAB RESULTS  Recent Results (from the past 24 hour(s))   Comprehensive Metabolic Panel    Collection Time: 01/10/24  1:31 PM    Specimen: Blood   Result Value Ref Range    Glucose 108 (H) 65 - 99 mg/dL    BUN 17 8 - 23 mg/dL    Creatinine 0.81 0.57 - 1.00 mg/dL    Sodium 139 136 - 145 mmol/L    Potassium 3.9 3.5 - 5.2 mmol/L    Chloride 100 98 - 107 mmol/L    CO2 27.0 22.0 - 29.0 mmol/L    Calcium 9.5 8.2 - 9.6 mg/dL    Total Protein 6.6 6.0 - 8.5 g/dL    Albumin 4.3 3.5 - 5.2 g/dL    ALT (SGPT) 10 1 - 33 U/L    AST (SGOT) 17 1 - 32 U/L    Alkaline Phosphatase 96 39 - 117 U/L    Total Bilirubin 0.3 0.0 - 1.2 mg/dL    Globulin 2.3 gm/dL    A/G Ratio 1.9 g/dL    BUN/Creatinine Ratio 21.0 7.0 - 25.0    Anion Gap 12.0 5.0 - 15.0 mmol/L    eGFR 66.9 >60.0 mL/min/1.73   Green Top (Gel)    Collection Time: 01/10/24  1:31 PM   Result Value Ref Range    Extra Tube Hold for add-ons.    Lavender Top    Collection Time: 01/10/24  1:31 PM   Result Value Ref Range    Extra Tube hold for add-on    Gold Top - SST    Collection Time: 01/10/24  1:31 PM   Result Value Ref Range    Extra Tube Hold for add-ons.    Light Blue Top    Collection Time: 01/10/24  1:31 PM   Result Value Ref Range    Extra Tube Hold for add-ons.    CBC Auto Differential    Collection Time: 01/10/24  1:31 PM    Specimen: Blood   Result Value Ref Range    WBC 3.94 3.40 - 10.80 10*3/mm3    RBC 3.76 (L) 3.77 - 5.28 10*6/mm3    Hemoglobin 11.6 (L) 12.0 - 15.9 g/dL    Hematocrit 35.9 34.0 - 46.6 %    MCV 95.5 79.0 - 97.0 fL    MCH 30.9 26.6 - 33.0 pg    MCHC 32.3 31.5 - 35.7 g/dL    RDW 12.2 (L) 12.3 - 15.4 %    RDW-SD 42.6 37.0 - 54.0 fl    MPV 9.7 6.0 - 12.0 fL    Platelets 225 140 - 450 10*3/mm3    Neutrophil % 50.6 42.7 - 76.0 %    Lymphocyte % 34.8 19.6 - 45.3 %     Monocyte % 10.2 5.0 - 12.0 %    Eosinophil % 3.3 0.3 - 6.2 %    Basophil % 0.8 0.0 - 1.5 %    Immature Grans % 0.3 0.0 - 0.5 %    Neutrophils, Absolute 2.00 1.70 - 7.00 10*3/mm3    Lymphocytes, Absolute 1.37 0.70 - 3.10 10*3/mm3    Monocytes, Absolute 0.40 0.10 - 0.90 10*3/mm3    Eosinophils, Absolute 0.13 0.00 - 0.40 10*3/mm3    Basophils, Absolute 0.03 0.00 - 0.20 10*3/mm3    Immature Grans, Absolute 0.01 0.00 - 0.05 10*3/mm3    nRBC 0.0 0.0 - 0.2 /100 WBC   Protime-INR    Collection Time: 01/10/24  1:31 PM    Specimen: Blood   Result Value Ref Range    Protime 13.2 11.7 - 14.2 Seconds    INR 0.99 0.90 - 1.10   aPTT    Collection Time: 01/10/24  1:31 PM    Specimen: Blood   Result Value Ref Range    PTT 25.2 22.7 - 35.4 seconds   Type & Screen    Collection Time: 01/10/24  2:05 PM    Specimen: Blood   Result Value Ref Range    ABO Type O     RH type Positive     Antibody Screen Negative     T&S Expiration Date 1/13/2024 11:59:59 PM          RADIOLOGY  CT Abdomen Pelvis With Contrast    Result Date: 1/10/2024  CT ABDOMEN AND PELVIS WITH IV CONTRAST  HISTORY: Rectal bleeding. Bloody stools since yesterday.  TECHNIQUE:  CT includes axial imaging from the lung bases to the trochanters with intravenous contrast and without use of oral contrast. Data reconstructed in coronal and sagittal planes. Radiation dose reduction techniques were utilized, including automated exposure control and exposure modulation based on body size.  COMPARISON: CT angiogram abdomen and pelvis 04/24/2023.  FINDINGS: Calcified nodule is present in the left lower lobe. Liver, spleen, adrenal glands, pancreas appear within normal limits. There is a 9 mm posterior right renal low-density lesion which is most likely a cyst. There is no renal stone or evidence for obstructive uropathy. Small gallstones are present. There is diffuse atherosclerotic disease and there appear to be moderate stenoses involving the origins of the celiac and superior  mesenteric arteries.  There is colonic diverticulosis which is severe involving the sigmoid colon. There is no evidence for diverticulitis. Within the redundant sigmoid colon within the left lower pelvis there is dense material within the lumen of the sigmoid colon as demonstrated on axial image 103. No other dense material is present in the colon and this could represent a small area of active extravasation or hemorrhage into the colon. There is no hematoma or fluid distention of the colon or further evidence for hemorrhage.  The bones are osteopenic. There is a scoliotic curvature of the lumbar spine associated with multilevel degenerative disc disease and facet arthritis.      1. Within the left aspect of a redundant sigmoid colon in the left lower pelvis there is increased density material potentially representing a small area of active hemorrhage into the colon though there is no further evidence to suggest hemorrhage (image 103). No noncontrasted imaging was performed to confirm this dense material was pre-existing. There is extensive diverticulosis greatest involving the sigmoid colon. No evidence for bowel obstruction. 2. Cholelithiasis. 3. Atherosclerotic disease. Moderate celiac and SMA origin stenoses.  Discussed with Dr. Kearns in the emergency department on 01/10/2024 at 3:30 p.m.  Radiation dose reduction techniques were utilized, including automated exposure control and exposure modulation based on body size.   This report was finalized on 1/10/2024 4:33 PM by Dr. Sylvain Arizmendi M.D on Workstation: [id number]         MEDICATIONS GIVEN IN ER  Medications   sodium chloride 0.9 % flush 10 mL (has no administration in time range)   sodium chloride 0.9 % flush 10 mL (has no administration in time range)   pantoprazole (PROTONIX) injection 80 mg (80 mg Intravenous Given 1/10/24 1756)     And   pantoprazole (PROTONIX) 40 mg in 100 mL NS (VTB) (40 mg Intravenous New Bag 1/10/24 1756)   pantoprazole  (PROTONIX) injection 40 mg (40 mg Intravenous Given 1/10/24 1402)   ondansetron (ZOFRAN) injection 4 mg (4 mg Intravenous Given 1/10/24 1404)   iopamidol (ISOVUE-300) 61 % injection 100 mL (85 mL Intravenous Given by Other 1/10/24 1459)         ORDERS PLACED DURING THIS VISIT:  Orders Placed This Encounter   Procedures    CT Abdomen Pelvis With Contrast    [place] Draw    Comprehensive Metabolic Panel    Occult Blood X 1, Stool - Stool, Per Rectum    CBC Auto Differential    Protime-INR    aPTT    NPO Diet NPO Type: Strict NPO    Undress & Gown    Measure Blood Pressure    Vital Signs    Orthostatic Blood Pressure    Code Status and Medical Interventions:    [facility] (on-call MD unless specified) Details    Pulse Oximetry    Oxygen Therapy- Nasal Cannula; Titrate 1-6 LPM Per SpO2; 90 - 95%    POC Occult Blood Stool    Type & Screen    Insert Peripheral IV    Insert Peripheral IV    Inpatient Admission    CBC & Differential    Green Top (Gel)    Lavender Top    Gold Top - SST    Light Blue Top         OUTPATIENT MEDICATION MANAGEMENT:  Current Facility-Administered Medications Ordered in Epic   Medication Dose Route Frequency Provider Last Rate Last Admin    pantoprazole (PROTONIX) 40 mg in 100 mL NS (VTB)  40 mg Intravenous Continuous Elia Almanzar MD 20 mL/hr at 01/10/24 1756 40 mg at 01/10/24 1756    sodium chloride 0.9 % flush 10 mL  10 mL Intravenous PRN Emergency, Triage Protocol, MD        sodium chloride 0.9 % flush 10 mL  10 mL Intravenous PRN Noelle Kearns MD         No current [facility]-ordered outpatient medications on file.         PROCEDURES  Procedures            PROGRESS, DATA ANALYSIS, CONSULTS, AND MEDICAL DECISION MAKING  All labs have been independently interpreted by me.  All radiology studies have been reviewed by me. All EKG's have been independently viewed and interpreted by me.  Discussion below represents my analysis of pertinent findings related to patient's condition, differential diagnosis,  "treatment plan and final disposition.    Differential diagnosis includes but is not limited to:  The differential diagnoses list for this patient with lower GI bleeding includes inflammatory bowel diseases, food intolerances, peptic ulcer or brisk upper gi bleeding, colonic ulcers (including rectal ulcers associated with HIV, Syphilis, STIs), anal abscess or fistula, diverticulitis or colitis, large bleeding polyps, AVM, colon cancer/mass, AAA, rectal foreign body, and even parasitic infections causing bleeding. No evidence of hemorrhagic shock.  Plan to check labs to evaluate the extent of bleeding, including H/H.  Will image patient given history of diverticulosis although she has a benign exam with no abdominal tenderness.  Will continue to reassess.  .    Clinical Scores:                  ED Course as of 01/10/24 1846   Wed Mejia 10, 2024   1522 I viewed patient's CT abdomen pelvis and on my interpretation patient does have a gallstone but no signs of free air or free fluid nor bowel obstruction [AR]   1529 I discussed patient's case with the radiologist on.  He had a concern about possibility of a focal \"blush\" in the sigmoid colon indicating perhaps ongoing bleeding.  There is no other signs of overt bleeding.  Patient has extensive diverticulosis without any signs of diverticulitis any did note the gallstones that patient has as well. [AR]   1531 WBC: 3.94 [AR]   1531 Hemoglobin(!): 11.6 [AR]   1531 Platelets: 225 [AR]   1531 Sodium: 139 [AR]   1531 Potassium: 3.9 [AR]   1531 Chloride: 100 [AR]   1531 CO2: 27.0 [AR]   1531 BUN: 17 [AR]   1531 Creatinine: 0.81 [AR]   1531 Glucose(!): 108 [AR]   1531 Calcium: 9.5 [AR]   1531 Alkaline Phosphatase: 96 [AR]   1531 AST (SGOT): 17 [AR]   1531 ALT (SGPT): 10 [AR]   1531 Protime: 13.2 [AR]   1531 INR: 0.99  Patient's laboratory studies significant for hemoglobin of 11.6.  On prior evaluation December 11 patient's hemoglobin was 11.8 so this appears stable. [AR]   1544 I " discussed patient's case with Dr. Almanzar with A who is amenable to admitting the patient for further care. [AR]      ED Course User Index  [AR] Noelle Kearns MD             AS OF 18:46 EST VITALS:    BP - 164/81  HR - 78  TEMP - 97.9 °F (36.6 °C) (Oral)  O2 SATS - 98%      COMPLEXITY OF CARE  The patient requires admission.    DIAGNOSIS  Final diagnoses:   Acute lower GI bleeding   Anticoagulated         DISPOSITION  ED Disposition       ED Disposition   Decision to Admit    Condition   --    Comment   Level of Care: Telemetry [5]   Diagnosis: Acute lower GI bleeding [212360]   Admitting Physician: VIKKI ALMANZAR [[id number]]   Attending Physician: VIKKI ALMANZAR [[id number]]   Certification: I Certify That Inpatient Hospital Services Are Medically Necessary For Greater Than 2 Midnights                  Please note that portions of this document were completed with a voice recognition program.    Note Disclaimer: At [facility], we believe that sharing information builds trust and better relationships. You are receiving this note because you recently visited [facility]. It is possible you will see health information before a provider has talked with you about it. This kind of information can be easy to misunderstand. To help you fully understand what it means for your health, we urge you to discuss this note with your provider.         Noelle Kearns MD  01/10/24 0391

## 2024-01-11 LAB
HCT VFR BLD AUTO: 27.8 % (ref 34–46.6)
HCT VFR BLD AUTO: 28.5 % (ref 34–46.6)
HCT VFR BLD AUTO: 30.2 % (ref 34–46.6)
HEMOCCULT STL QL: POSITIVE
HGB BLD-MCNC: 9.3 G/DL (ref 12–15.9)
HGB BLD-MCNC: 9.3 G/DL (ref 12–15.9)
HGB BLD-MCNC: 9.9 G/DL (ref 12–15.9)

## 2024-01-11 PROCEDURE — 82272 OCCULT BLD FECES 1-3 TESTS: CPT

## 2024-01-11 PROCEDURE — 85014 HEMATOCRIT: CPT | Performed by: NURSE PRACTITIONER

## 2024-01-11 PROCEDURE — 85018 HEMOGLOBIN: CPT | Performed by: NURSE PRACTITIONER

## 2024-01-11 PROCEDURE — 99222 1ST HOSP IP/OBS MODERATE 55: CPT | Performed by: INTERNAL MEDICINE

## 2024-01-11 RX ADMIN — Medication 10 ML: at 09:19

## 2024-01-11 RX ADMIN — SODIUM CHLORIDE 40 MG: 9 INJECTION, SOLUTION INTRAVENOUS at 15:40

## 2024-01-11 RX ADMIN — SODIUM CHLORIDE 40 MG: 9 INJECTION, SOLUTION INTRAVENOUS at 21:28

## 2024-01-11 RX ADMIN — CITALOPRAM 10 MG: 10 TABLET, FILM COATED ORAL at 09:19

## 2024-01-11 RX ADMIN — SODIUM CHLORIDE: 9 INJECTION, SOLUTION INTRAVENOUS at 10:45

## 2024-01-11 RX ADMIN — SODIUM CHLORIDE 40 MG: 9 INJECTION, SOLUTION INTRAVENOUS at 05:19

## 2024-01-11 NOTE — CONSULTS
Name: Cristela Peralta ADMIT: 1/10/2024   : 1928  PCP: Arelis Lara MD    MRN: [id number] LOS: 1 days   AGE/SEX: 89 y.o. female  ROOM: [address]      Patient Care Team:  Arelis Lara MD as PCP - General  Chief Complaint   Patient presents with    Rectal Bleeding     CC:GI bleed, consult for recs regarding plavix    Subjective     Inpatient Vascular Surgery Consult  Consult performed by: Wojciech Alberto II, MD  Consult ordered by: Elia Almanzar MD        History of Present Illness  Patient is a pleasant 89-year-old lady with history of diverticulosis with previous failed colonoscopy, peripheral arterial disease with claudication approximately quarter mile, and asymptomatic carotid artery stenosis who follows with my partner Dr. Gibson.  The patient had previously been found to have elevated velocities on carotid duplex that was increasing over the course of approximately 6 months and so CTA was ordered that confirmed approximately 70% right ICA stenosis as well as moderate stenosis in the left carotid.  Patient had previously been on aspirin but at her last office visit with  in December was started on Plavix as well due to the high-grade stenosis in the right ICA.  The patient has no prior diagnosis of stroke.  She denies any episodes within the last 2 years of unilateral arm or leg weakness numbness, slurred speech, facial droop, vision loss or any other symptoms concerning for stroke.   She reports yesterday morning she had a bowel movement with some painless blood per rectum.  Initially it was scant/flaky but then large volume of blood.  She reports another bloody bowel movement yesterday evening and still some blood per rectum this morning.  We have been asked to see her since this all started after her having been put on dual antiplatelet therapy.  She reports some mild abdominal discomfort/tenderness but no other complaints.  She denies any dizziness or symptoms concerning  for presyncope.  She reports some tightness in her calves bilaterally with ambulation of long distances.  She reports this starts at approximately 1/4 mile distance.   At the time of my evaluation the patient is resting comfortably in bed in no acute distress.    Review of Systems    Past Medical History:   Diagnosis Date    Cataract     Degeneration of cervical intervertebral disc     Depression     Diverticulosis     GI bleed     H/O bone density study 12/11/2015    History of depression     History of diastolic dysfunction     History of diverticulosis     History of EKG 04/29/2015    History of herpes genitalis     History of mammogram 12/11/2015    History of medical problems     PVD, talia    History of spinal stenosis     History of vertigo     HL (hearing loss)     Hyperlipidemia     Hypertension     Osteoarthritis     Osteopenia     Osteoporosis     Visual impairment      Past Surgical History:   Procedure Laterality Date    APPENDECTOMY      COLONOSCOPY N/A 01/02/2020    Procedure: COLONOSCOPY;  Surgeon: Tung Muir MD;  Location: [facility] ENDOSCOPY;  Service: Gastroenterology    ENDOSCOPY N/A 01/01/2020    Procedure: ESOPHAGOGASTRODUODENOSCOPY;  Surgeon: Yanet Obregon MD;  Location: [facility] ENDOSCOPY;  Service: Gastroenterology    HYSTERECTOMY      TOTAL    JOINT REPLACEMENT      KNEE SURGERY      Replacement    TOTAL ABDOMINAL HYSTERECTOMY WITH SALPINGO OOPHORECTOMY       Family History   Problem Relation Age of Onset    Aneurysm Father     Heart disease Father     Colon cancer Sister     Throat cancer Sister        Social History     Tobacco Use    Smoking status: Never     Passive exposure: Past    Smokeless tobacco: Never   Vaping Use    Vaping Use: Never used   Substance Use Topics    Alcohol use: No    Drug use: Never     Medications Prior to Admission   Medication Sig Dispense Refill Last Dose    aspirin 81 MG EC tablet Take 1 tablet by mouth Daily. 90 tablet 3      CALCIUM-MAGNESIUM-VITAMIN D PO Take  by mouth Daily.       celecoxib (CeleBREX) 200 MG capsule TAKE 1 CAPSULE EVERY DAY 90 capsule 3     cetirizine (zyrTEC) 10 MG tablet Take 1 tablet by mouth Daily.       citalopram (CeleXA) 10 MG tablet TAKE 1 TABLET EVERY DAY 90 tablet 3     clopidogrel (Plavix) 75 MG tablet Take 1 tablet by mouth Daily.       Coenzyme Q10 (COQ10 PO) Take  by mouth Daily.       hydroCHLOROthiazide (HYDRODIURIL) 12.5 MG tablet TAKE 1 TABLET EVERY DAY 90 tablet 3     Multiple Vitamins-Minerals (CENTRUM SILVER) tablet Take 1 tablet by mouth Daily.       Omega-3 Fatty Acids (FISH OIL TRIPLE STRENGTH) 1400 MG capsule Take 1 capsule by mouth Daily.       prednisoLONE acetate (PRED FORTE) 1 % ophthalmic suspension Administer 1 drop into the left eye Daily.       rosuvastatin (Crestor) 20 MG tablet Take 1 tablet by mouth Daily.       vitamin C (ASCORBIC ACID) 500 MG tablet Take 1 tablet by mouth Daily.        citalopram, 10 mg, Oral, Daily  rosuvastatin, 20 mg, Oral, Daily      pantoprazole, 40 mg, Last Rate: 40 mg (01/11/24 0519)        sodium chloride    [COMPLETED] Insert Peripheral IV **AND** sodium chloride  Neomycin-bacitracin zn-polymyx, Atorvastatin, Pravastatin, Alendronate, and Sulfa antibiotics    Objective     Physical Exam:  Physical Exam   NAD  89 year old lady, overall appears quite robust and healthy for her age.   Abdomen soft, non-tender  Palpable radial pulses  Non palpable pedal pulses, no wounds or sores on feet  5/5 strengtch x4, CN II-XII intact    Vital Signs and Labs:  Vital Signs Patient Vitals for the past 24 hrs:   BP Temp Temp src Pulse Resp SpO2 Height Weight   01/11/24 0702 151/57 97.9 °F (36.6 °C) Oral 88 18 94 % -- --   01/10/24 2311 150/80 97.8 °F (36.6 °C) Oral 82 18 94 % -- --   01/10/24 1922 175/75 98 °F (36.7 °C) Oral 79 18 97 % -- --   01/10/24 1840 164/81 97.9 °F (36.6 °C) Oral -- 18 98 % -- --   01/10/24 1743 -- -- -- 78 -- 94 % -- --   01/10/24 1742 -- -- -- 75  "-- 96 % -- --   01/10/24 1730 -- -- -- 75 -- 96 % -- --   01/10/24 1723 -- -- -- 78 -- 100 % -- --   01/10/24 1716 177/87 -- -- 78 -- 97 % -- --   01/10/24 1707 -- -- -- 75 -- 96 % -- --   01/10/24 1706 -- -- -- 78 -- 97 % -- --   01/10/24 1647 -- -- -- 76 -- 95 % -- --   01/10/24 1617 -- -- -- 72 -- 96 % -- --   01/10/24 1616 -- -- -- 77 -- 95 % -- --   01/10/24 1606 -- -- -- 70 -- 96 % -- --   01/10/24 1603 -- -- -- 75 -- 99 % -- --   01/10/24 1601 154/75 -- -- 73 -- 98 % -- --   01/10/24 1553 -- -- -- 75 -- 93 % -- --   01/10/24 1552 -- -- -- 73 -- 99 % -- --   01/10/24 1548 -- -- -- 70 -- 97 % -- --   01/10/24 1547 -- -- -- 73 -- 100 % -- --   01/10/24 1546 -- -- -- 70 -- 99 % -- --   01/10/24 1536 161/80 -- -- 68 -- 98 % -- --   01/10/24 1512 -- -- -- 74 -- 94 % -- --   01/10/24 1511 150/75 -- -- 71 -- 97 % -- --   01/10/24 1446 127/78 -- -- 69 -- 97 % -- --   01/10/24 1437 -- -- -- 70 -- 96 % -- --   01/10/24 1428 -- -- -- 72 -- 98 % -- --   01/10/24 1421 150/62 -- -- 73 -- 96 % -- --   01/10/24 1411 -- -- -- 75 -- 97 % -- --   01/10/24 1404 -- -- -- 77 -- 98 % -- --   01/10/24 1403 -- -- -- 82 -- 97 % -- --   01/10/24 1356 146/75 -- -- 78 -- 97 % -- --   01/10/24 1350 -- -- -- 80 -- 99 % -- --   01/10/24 1343 -- -- -- 87 -- 100 % -- --   01/10/24 1338 -- -- -- 84 -- 97 % -- --   01/10/24 1337 -- -- -- 87 -- 96 % -- --   01/10/24 1331 150/73 -- -- 91 -- 98 % -- --   01/10/24 1327 -- -- -- 98 -- 98 % -- --   01/10/24 1326 156/88 -- -- 97 18 -- 167.6 cm (66\") 63.5 kg (140 lb)   01/10/24 1319 -- 98.5 °F (36.9 °C) Tympanic 111 16 96 % -- --     I/O:  I/O last 3 completed shifts:  In: -   Out: 400 [Urine:400]    CBC    Results from last 7 days   Lab Units 01/11/24  0814 01/11/24  0157 01/10/24  1331   WBC 10*3/mm3  --   --  3.94   HEMOGLOBIN g/dL 9.9* 9.3* 11.6*   PLATELETS 10*3/mm3  --   --  225     BMP   Results from last 7 days   Lab Units 01/10/24  1331   SODIUM mmol/L 139   POTASSIUM mmol/L 3.9   CHLORIDE " "mmol/L 100   CO2 mmol/L 27.0   BUN mg/dL 17   CREATININE mg/dL 0.81   GLUCOSE mg/dL 108*     Cr Clearance Estimated Creatinine Clearance: 41.6 mL/min (by C-G formula based on SCr of 0.81 mg/dL).  Coag   Results from last 7 days   Lab Units 01/10/24  1331   INR  0.99   APTT seconds 25.2     HbA1C No results found for: \"HGBA1C\"  Blood Glucose No results found for: \"POCGLU\"  Infection     CMP   Results from last 7 days   Lab Units 01/10/24  1331   SODIUM mmol/L 139   POTASSIUM mmol/L 3.9   CHLORIDE mmol/L 100   CO2 mmol/L 27.0   BUN mg/dL 17   CREATININE mg/dL 0.81   GLUCOSE mg/dL 108*   ALBUMIN g/dL 4.3   BILIRUBIN mg/dL 0.3   ALK PHOS U/L 96   AST (SGOT) U/L 17   ALT (SGPT) U/L 10     ABG      UA      SLIM  No results found for: \"POCMETH\", \"POCAMPHET\", \"POCBARBITUR\", \"POCBENZO\", \"POCCOCAINE\", \"POCOPIATES\", \"POCOXYCODO\", \"POCPHENCYC\", \"POCPROPOXY\", \"POCTHC\", \"POCTRICYC\"  Lysis Labs   Results from last 7 days   Lab Units 01/11/24  0814 01/11/24  0157 01/10/24  1331   INR   --   --  0.99   APTT seconds  --   --  25.2   HEMOGLOBIN g/dL 9.9* 9.3* 11.6*   PLATELETS 10*3/mm3  --   --  225   CREATININE mg/dL  --   --  0.81     Radiology(recent) CT Abdomen Pelvis With Contrast    Result Date: 1/10/2024  1. Within the left aspect of a redundant sigmoid colon in the left lower pelvis there is increased density material potentially representing a small area of active hemorrhage into the colon though there is no further evidence to suggest hemorrhage (image 103). No noncontrasted imaging was performed to confirm this dense material was pre-existing. There is extensive diverticulosis greatest involving the sigmoid colon. No evidence for bowel obstruction. 2. Cholelithiasis. 3. Atherosclerotic disease. Moderate celiac and SMA origin stenoses.  Discussed with Dr. Kearns in the emergency department on 01/10/2024 at 3:30 p.m.  Radiation dose reduction techniques were utilized, including automated exposure control and exposure " modulation based on body size.   This report was finalized on 1/10/2024 4:33 PM by Dr. Sylvain Arizmendi M.D on Workstation: [id number]       Active Hospital Problems    Diagnosis  POA    **Acute lower GI bleeding [K92.2]  Yes    Stenosis of right carotid artery [I65.21]  Yes    PVD (peripheral vascular disease) [I73.9]  Yes    Hyperlipidemia [E78.5]  Yes    Hypertension [I10]  Yes      Resolved Hospital Problems   No resolved problems to display.     Problem Points:  1:  Patient has an established problem, stable or improving  Total problem points:1    Data Points:  1:  I have reviewed or order clinical lab test  1:  I have reviewed or order radiology test (except heart catheterization or echo)  2:  I have personally and independently review of image, tracing, or specimen  2:  I have reviewed and summation of old records and/or discussed the patients care with another health care provider  Total data points:4 or more    Risk Points:  Moderate:  Acute compliated injury    MDM requires 2/3 (Problem points, Data points and Risk)  MDM Prob point Data point Risk   SF 1 1 Minimal   Low 2 2 Low   Mod 3 3 Moderate   High 4 4 High     Code requires 3/3 (MDM, History and Exam)  Code MDM History Exam Time   96291 SF/Low Detailed Detailed 30   90493 Mod Comprehensive Comprehensive 50   26102 High Comprehensive Comprehensive 70     Detailed history:  4 elements HPI or status of 3 chronic problems; 2-9 system ROS  Comprehensive:  4 elements HPI or status of 3 chronic problems;  10 system ROS    Detailed Exam:  12 findings from any organ system  Comprehensive Exam:  2 findings from each of 9 systems.   05601    Assessment & Plan       Acute lower GI bleeding    Hyperlipidemia    Hypertension    PVD (peripheral vascular disease)    Stenosis of right carotid artery      89 y.o. female admitted with painless lower GI bleeding, with history of peripheral arterial disease and carotid artery stenosis recently started on dual  antiplatelet therapy with aspirin and Plavix.  In the setting of acute GI bleed I would recommend holding the Plavix.  Can hold aspirin as well if felt to be necessary by gastroenterology, however would definitely recommend resuming single antiplatelet with aspirin prior to discharge if at all possible.  With asymptomatic stenosis of 70%, patient could discharge on single antiplatelet therapy with aspirin and her current dose of Crestor and follow-up outpatient with Dr. Gibson to continue ongoing discussion of relative benefit of dual antiplatelet therapy in setting of asymptomatic, but fairly high grade stenosis.   Per my discussion with gastroenterologist Dr. Muir, this is likely secondary to diverticular disease. I suspect at her age there would be no surgical intervention performed to decrease risk of recurrent future GI bleeds.    I discussed the patients findings and my recommendations with patient and consulting provider.    Wojciech Alberto II, MD  01/11/24  09:37 EST    Please call my office with any question: [phone]

## 2024-01-11 NOTE — CONSULTS
[facility]  Initial Inpatient Consult Note    Referring Provider: Dr Almanzar    Reason for Consultation: GI bleed    Subjective     History of present illness:    89 y.o. female presenting with c/o blood [facility].  Sx began yesterday, had BM with red blood and clots.  One small BM with clots overnight since admission. Hb 11.6 on presentation, down to 9.3 today.  Pt started on Plavix about 1 week ago for severe PVD. CT A/P noted below;    Pt had prior similar episode about 3 years ago, had attempt at colonoscopy but was unsuccessful due to severe diverticular disease and angulated distal colon.  Eventually had spontaneous resolution of her sx.      CT A/P with IV contrast  IMPRESSION:  1. Within the left aspect of a redundant sigmoid colon in the left lower  pelvis there is increased density material potentially representing a  small area of active hemorrhage into the colon though there is no  further evidence to suggest hemorrhage (image 103). No noncontrasted  imaging was performed to confirm this dense material was pre-existing.  There is extensive diverticulosis greatest involving the sigmoid colon.  No evidence for bowel obstruction.  2. Cholelithiasis.  3. Atherosclerotic disease. Moderate celiac and SMA origin stenoses.    Past Medical History:  Past Medical History:   Diagnosis Date    Cataract     Degeneration of cervical intervertebral disc     Depression     Diverticulosis     GI bleed     H/O bone density study 12/11/2015    History of depression     History of diastolic dysfunction     History of diverticulosis     History of EKG 04/29/2015    History of herpes genitalis     History of mammogram 12/11/2015    History of medical problems     PVD, talia    History of spinal stenosis     History of vertigo     HL (hearing loss)     Hyperlipidemia     Hypertension     Osteoarthritis     Osteopenia     Osteoporosis     Visual impairment      Past Surgical History:  Past Surgical History:    Procedure Laterality Date    APPENDECTOMY      COLONOSCOPY N/A 01/02/2020    Procedure: COLONOSCOPY;  Surgeon: Tung Muir MD;  Location:  [facility]OSCOPY;  Service: Gastroenterology    ENDOSCOPY N/A 01/01/2020    Procedure: ESOPHAGOGASTRODUODENOSCOPY;  Surgeon: Yanet Obregon MD;  Location:  [facility] ENDOSCOPY;  Service: Gastroenterology    HYSTERECTOMY      TOTAL    JOINT REPLACEMENT      KNEE SURGERY      Replacement    TOTAL ABDOMINAL HYSTERECTOMY WITH SALPINGO OOPHORECTOMY        Social History:   Social History     Tobacco Use    Smoking status: Never     Passive exposure: Past    Smokeless tobacco: Never   Substance Use Topics    Alcohol use: No      Family History:  Family History   Problem Relation Age of Onset    Aneurysm Father     Heart disease Father     Colon cancer Sister     Throat cancer Sister        Home Meds:  Medications Prior to Admission   Medication Sig Dispense Refill Last Dose    aspirin 81 MG EC tablet Take 1 tablet by mouth Daily. 90 tablet 3     CALCIUM-MAGNESIUM-VITAMIN D PO Take  by mouth Daily.       celecoxib (CeleBREX) 200 MG capsule TAKE 1 CAPSULE EVERY DAY 90 capsule 3     cetirizine (zyrTEC) 10 MG tablet Take 1 tablet by mouth Daily.       citalopram (CeleXA) 10 MG tablet TAKE 1 TABLET EVERY DAY 90 tablet 3     clopidogrel (Plavix) 75 MG tablet Take 1 tablet by mouth Daily.       Coenzyme Q10 (COQ10 PO) Take  by mouth Daily.       hydroCHLOROthiazide (HYDRODIURIL) 12.5 MG tablet TAKE 1 TABLET EVERY DAY 90 tablet 3     Multiple Vitamins-Minerals (CENTRUM SILVER) tablet Take 1 tablet by mouth Daily.       Omega-3 Fatty Acids (FISH OIL TRIPLE STRENGTH) 1400 MG capsule Take 1 capsule by mouth Daily.       prednisoLONE acetate (PRED FORTE) 1 % ophthalmic suspension Administer 1 drop into the left eye Daily.       rosuvastatin (Crestor) 20 MG tablet Take 1 tablet by mouth Daily.       vitamin C (ASCORBIC ACID) 500 MG tablet Take 1 tablet by mouth Daily.        Current  "Meds:   citalopram, 10 mg, Oral, Daily  rosuvastatin, 20 mg, Oral, Daily      Allergies:  Allergies   Allergen Reactions    Neomycin-Bacitracin Zn-Polymyx Rash    Atorvastatin Other (See Comments)     LEG CRAMPS    Pravastatin Other (See Comments)     LEG CRAMPS    Alendronate GI Intolerance    Sulfa Antibiotics Hives and Rash       Objective     Vital Signs  Temp:  [97.8 °F (36.6 °C)-98.5 °F (36.9 °C)] 97.9 °F (36.6 °C)  Heart Rate:  [] 88  Resp:  [16-18] 18  BP: (127-177)/(57-88) 151/57  Physical Exam:  General Appearance:     Alert, cooperative, in no acute distress   Abdomen:     Normal bowel sounds, no masses, no organomegaly, soft     nontender, nondistended, no guarding, no rebound                 tenderness   Rectal:     Deferred       Results Review:   I reviewed the patient's new clinical results.  I reviewed the patient's new imaging results and agree with the interpretation.    Results from last 7 days   Lab Units 01/11/24  0157 01/10/24  1331   WBC 10*3/mm3  --  3.94   HEMOGLOBIN g/dL 9.3* 11.6*   HEMATOCRIT % 27.8* 35.9   PLATELETS 10*3/mm3  --  225     Results from last 7 days   Lab Units 01/10/24  1331   SODIUM mmol/L 139   POTASSIUM mmol/L 3.9   CHLORIDE mmol/L 100   CO2 mmol/L 27.0   BUN mg/dL 17   CREATININE mg/dL 0.81   CALCIUM mg/dL 9.5   BILIRUBIN mg/dL 0.3   ALK PHOS U/L 96   ALT (SGPT) U/L 10   AST (SGOT) U/L 17   GLUCOSE mg/dL 108*     Results from last 7 days   Lab Units 01/10/24  1331   INR  0.99     No results found for: \"LIPASE\"    Radiology:  CT Abdomen Pelvis With Contrast   Final Result   1. Within the left aspect of a redundant sigmoid colon in the left lower   pelvis there is increased density material potentially representing a   small area of active hemorrhage into the colon though there is no   further evidence to suggest hemorrhage (image 103). No noncontrasted   imaging was performed to confirm this dense material was pre-existing.   There is extensive diverticulosis " greatest involving the sigmoid colon.   No evidence for bowel obstruction.   2. Cholelithiasis.   3. Atherosclerotic disease. Moderate celiac and SMA origin stenoses.       Discussed with Dr. Kearns in the emergency department on 01/10/2024 at   3:30 p.m.        Radiation dose reduction techniques were utilized, including automated   exposure control and exposure modulation based on body size.           This report was finalized on 1/10/2024 4:33 PM by Dr. Sylvain Arizmendi M.D on Workstation: [id number]              Assessment & Plan   Assessment:    Hematochezia   Diverticular disease   PVD on plavix, held   Prior incomplete colonoscopy due to severely redundant colon    Plan:   Await repeat H/H, if there is further bleeding or significant decline in Hb, will proceed with tagged RBC scan vs CTA  She has had prior failed colonoscopy due to severe diverticular disease and redundant anatomy, I don't think endoscopic evaluation with be successful in her case  Continue to hold Plavix    I discussed the patients findings and my recommendations with patient.         Tung Muir M.D.  [facility]  [address] [place] [address]  Office: [phone]

## 2024-01-11 NOTE — PLAN OF CARE
Goal Outcome Evaluation:  Plan of Care Reviewed With: patient           Outcome Evaluation: Several small dark red stools. Specimen sent to lab. Protonix drip infusing. H/H's noted. Room air. Telemetry SR.

## 2024-01-11 NOTE — CASE MANAGEMENT/SOCIAL WORK
Continued Stay Note  [facility]     Patient Name: Cristela Peralta  MRN: [id number]  Today's Date: 1/11/2024    Admit Date: 1/10/2024    Plan: Plan home.  BRENDA Wong RN   Discharge Plan       Row Name 01/11/24 0822       Plan    Plan Plan home.  BRENDA Wong RN    Patient/Family in Agreement with Plan yes    Plan Comments FACE SHEET VERIFIED/ IM LETTER SIGNED.  Spoke with pt at bedside.  Pt's PCP is Dr. Mili Lara.  Pt lives alone in a two story condo.  Pt is independent with ADLs.  Pt has a bath bench, grab bar and rolling walker for home use if needed.  Pt gets her prescriptions at [facility] in [facility].  Pt denies any issues affording medications. Pt is not current with HH.  Pt has not been in SNF.  Pt denies any discharge needs at present.  Pt states her daughters (Asmita Barraza [phone] and Buffy Dorsey [phone]) and son  ( Jacobo Peralta) will assist her at home if needed and one of them will transport her home.  Plan home.  CHATO Macario                   Discharge Codes    No documentation.                 Expected Discharge Date and Time       Expected Discharge Date Expected Discharge Time    Mejia 15, 2024               Lisset Wong RN

## 2024-01-11 NOTE — CASE MANAGEMENT/SOCIAL WORK
Discharge Planning Assessment  [facility]     Patient Name: Cristela Peralta  MRN: [id number]  Today's Date: 1/11/2024    Admit Date: 1/10/2024    Plan: Plan home.  BRENDA Wong RN   Discharge Needs Assessment       Row Name 01/11/24 0820       Living Environment    People in Home alone    Current Living Arrangements condominium    Potentially Unsafe Housing Conditions none    Primary Care Provided by self    Provides Primary Care For no one    Family Caregiver if Needed child(zeferino), adult    Family Caregiver Names Daughters  (Asmita Barraza [phone] and Buffy Dorsey [phone])    Quality of Family Relationships involved;helpful;supportive    Able to Return to Prior Arrangements yes    Living Arrangement Comments Pt lives alone in a two story condo.       Resource/Environmental Concerns    Resource/Environmental Concerns none    Transportation Concerns none       Transition Planning    Patient/Family Anticipates Transition to home    Patient/Family Anticipated Services at Transition none    Transportation Anticipated family or friend will provide       Discharge Needs Assessment    Readmission Within the Last 30 Days no previous admission in last 30 days    Equipment Currently Used at Home bath bench;grab bar;walker, rolling    Concerns to be Addressed no discharge needs identified;denies needs/concerns at this time    Anticipated Changes Related to Illness none    Equipment Needed After Discharge bath bench;grab bar, tub/shower;walker, rolling                   Discharge Plan       Row Name 01/11/24 0822       Plan    Plan Plan home.  BRENDA Wong RN    Patient/Family in Agreement with Plan yes    Plan Comments FACE SHEET VERIFIED/ IM LETTER SIGNED.  Spoke with pt at bedside.  Pt's PCP is Dr. Mili Lara.  Pt lives alone in a two story condo.  Pt is independent with ADLs.  Pt has a bath bench, grab bar and rolling walker for home use if needed.  Pt gets her prescriptions at [facility] in [place] and  [place].  Pt denies any issues affording medications. Pt is not current with HH.  Pt has not been in SNF.  Pt denies any discharge needs at present.  Pt states her daughters (Asmita Barraza [phone] and Buffy Dorsey [phone]) and son  ( Jacobo Peralta) will assist her at home if needed and one of them will transport her home.  Plan home.  CHATO Macario                  Continued Care and Services - Admitted Since 1/10/2024    Coordination has not been started for this encounter.       Expected Discharge Date and Time       Expected Discharge Date Expected Discharge Time    Mejia 15, 2024            Demographic Summary       Row Name 01/11/24 0820       General Information    Admission Type inpatient    Arrived From emergency department    Required Notices Provided Important Message from Medicare    Referral Source admission list    Reason for Consult discharge planning    Preferred Language English                   Functional Status       Row Name 01/11/24 0820       Functional Status    Usual Activity Tolerance good    Current Activity Tolerance good       Functional Status, IADL    Medications independent    Meal Preparation independent    Housekeeping independent    Laundry independent    Shopping independent       Mental Status    General Appearance WDL WDL                   Psychosocial    No documentation.                  Abuse/Neglect    No documentation.                  Legal    No documentation.                  Substance Abuse    No documentation.                  Patient Forms    No documentation.                     Lisset Wong, RN

## 2024-01-11 NOTE — PLAN OF CARE
Goal Outcome Evaluation:      Pt AOx4. VSS. On RA. Up w/ assist x1. One small BM with few clots. Hemoglobin 9.3 this Am. NSR on tele. No acute distress noted.

## 2024-01-11 NOTE — PROGRESS NOTES
"    Name: Cirstela Peralta ADMIT: 1/10/2024   : 1928  PCP: Arelis Lara MD    MRN: [id number] LOS: 1 days   AGE/SEX: 89 y.o. female  ROOM: [facility]     Subjective   Examined at bedside    Objective   Objective   Vital Signs  Temp:  [97.8 °F (36.6 °C)-98.6 °F (37 °C)] 98.6 °F (37 °C)  Heart Rate:  [70-88] 78  Resp:  [18] 18  BP: (145-177)/(57-87) 145/79  SpO2:  [94 %-100 %] 95 %  on   ;   Device (Oxygen Therapy): room air  Body mass index is 22.6 kg/m².  Physical Exam    General: Alert and oriented x3, no acute distress  HEENT: Normocephalic, atraumatic  CV: Regular rate and rhythm, no murmurs rubs or gallops  Lungs: Clear to auscultation bilaterally, no crackles or wheezes  Abdomen: Soft, nontender, nondistended  Neuro: CN II-XII intact, no FND appreciated     Results Review     I reviewed the patient's new clinical results.  Results from last 7 days   Lab Units 24  0814 24  0157 01/10/24  1331   WBC 10*3/mm3  --   --  3.94   HEMOGLOBIN g/dL 9.9* 9.3* 11.6*   PLATELETS 10*3/mm3  --   --  225     Results from last 7 days   Lab Units 01/10/24  1331   SODIUM mmol/L 139   POTASSIUM mmol/L 3.9   CHLORIDE mmol/L 100   CO2 mmol/L 27.0   BUN mg/dL 17   CREATININE mg/dL 0.81   GLUCOSE mg/dL 108*   Estimated Creatinine Clearance: 41.6 mL/min (by C-G formula based on SCr of 0.81 mg/dL).  Results from last 7 days   Lab Units 01/10/24  1331   ALBUMIN g/dL 4.3   BILIRUBIN mg/dL 0.3   ALK PHOS U/L 96   AST (SGOT) U/L 17   ALT (SGPT) U/L 10     Results from last 7 days   Lab Units 01/10/24  1331   CALCIUM mg/dL 9.5   ALBUMIN g/dL 4.3     No results found for: \"COVID19\"  No results found for: \"HGBA1C\", \"POCGLU\"  No results found for this or any previous visit.      CT Abdomen Pelvis With Contrast  Narrative: CT ABDOMEN AND PELVIS WITH IV CONTRAST     HISTORY: Rectal bleeding. Bloody stools since yesterday.     TECHNIQUE:  CT includes axial imaging from the lung bases to the  trochanters with intravenous " contrast and without use of oral contrast.  Data reconstructed in coronal and sagittal planes. Radiation dose  reduction techniques were utilized, including automated exposure control  and exposure modulation based on body size.     COMPARISON: CT angiogram abdomen and pelvis 04/24/2023.     FINDINGS: Calcified nodule is present in the left lower lobe. Liver,  spleen, adrenal glands, pancreas appear within normal limits. There is a  9 mm posterior right renal low-density lesion which is most likely a  cyst. There is no renal stone or evidence for obstructive uropathy.  Small gallstones are present. There is diffuse atherosclerotic disease  and there appear to be moderate stenoses involving the origins of the  celiac and superior mesenteric arteries.     There is colonic diverticulosis which is severe involving the sigmoid  colon. There is no evidence for diverticulitis. Within the redundant  sigmoid colon within the left lower pelvis there is dense material  within the lumen of the sigmoid colon as demonstrated on axial image  103. No other dense material is present in the colon and this could  represent a small area of active extravasation or hemorrhage into the  colon. There is no hematoma or fluid distention of the colon or further  evidence for hemorrhage.     The bones are osteopenic. There is a scoliotic curvature of the lumbar  spine associated with multilevel degenerative disc disease and facet  arthritis.     Impression: 1. Within the left aspect of a redundant sigmoid colon in the left lower  pelvis there is increased density material potentially representing a  small area of active hemorrhage into the colon though there is no  further evidence to suggest hemorrhage (image 103). No noncontrasted  imaging was performed to confirm this dense material was pre-existing.  There is extensive diverticulosis greatest involving the sigmoid colon.  No evidence for bowel obstruction.  2. Cholelithiasis.  3.  Atherosclerotic disease. Moderate celiac and SMA origin stenoses.     Discussed with Dr. Kearns in the emergency department on 01/10/2024 at  3:30 p.m.      Radiation dose reduction techniques were utilized, including automated  exposure control and exposure modulation based on body size.        This report was finalized on 1/10/2024 4:33 PM by Dr. Sylvain Arizmendi M.D on Workstation: [id number]       Scheduled Medications  citalopram, 10 mg, Oral, Daily  rosuvastatin, 20 mg, Oral, Daily    Infusions  pantoprazole, 40 mg, Last Rate: 40 mg (01/11/24 1540)    Diet  Diet: Regular/House Diet; Texture: Regular Texture (IDDSI 7); Fluid Consistency: Thin (IDDSI 0)       Assessment/Plan     Active Hospital Problems    Diagnosis  POA    **Acute lower GI bleeding [K92.2]  Yes    Stenosis of right carotid artery [I65.21]  Yes    PVD (peripheral vascular disease) [I73.9]  Yes    Hyperlipidemia [E78.5]  Yes    Hypertension [I10]  Yes      Resolved Hospital Problems   No resolved problems to display.       89 y.o. female admitted with Acute lower GI bleeding.    Acute GI bleed  Has been given pantoprazole IV x 1 in the emergency department.  Started on PPI ggt  GI consulted, previous colonoscopy was had failed due to abnormal anatomy and severe diverticular disease.  If her hemoglobin decreases significantly or if there is further bleeding she will need a CT angiogram versus a tagged scan.  Continue holding Plavix.  Monitor hemoglobin every 8 hours  Hold NSAIDs, aspirin, Plavix     Peripheral vascular disease  Right carotid artery stenosis  Hold aspirin and Plavix for now.  Last dose was on 1/9  She would like to speak to her vascular surgeon, Dr. De La Cruz regarding discontinuation of Plavix.   Conversation of the patient had with vascular surgery has been noted.  Recommendation is that she can be off the Plavix, and can discharge on aspirin and current dose of Plavix and follow-up with Dr. Gibson as an  outpatient.    Hypertension  Hold off on hydrochlorothiazide due to active GI bleed    SCDs for DVT prophylaxis.  Full code.  Discussed with patient and nursing staff.  Anticipate discharge home in 1-2 days.      Elia Almanzar MD  [facility]  01/11/24  16:05 EST

## 2024-01-12 LAB
HCT VFR BLD AUTO: 24.4 % (ref 34–46.6)
HCT VFR BLD AUTO: 25.5 % (ref 34–46.6)
HCT VFR BLD AUTO: 28.4 % (ref 34–46.6)
HGB BLD-MCNC: 8 G/DL (ref 12–15.9)
HGB BLD-MCNC: 8.6 G/DL (ref 12–15.9)
HGB BLD-MCNC: 9.6 G/DL (ref 12–15.9)

## 2024-01-12 PROCEDURE — 85014 HEMATOCRIT: CPT | Performed by: NURSE PRACTITIONER

## 2024-01-12 PROCEDURE — 85018 HEMOGLOBIN: CPT | Performed by: NURSE PRACTITIONER

## 2024-01-12 PROCEDURE — 99232 SBSQ HOSP IP/OBS MODERATE 35: CPT

## 2024-01-12 RX ORDER — HYDROCHLOROTHIAZIDE 12.5 MG/1
12.5 TABLET ORAL DAILY
Status: DISCONTINUED | OUTPATIENT
Start: 2024-01-12 | End: 2024-01-13 | Stop reason: HOSPADM

## 2024-01-12 RX ADMIN — HYDROCHLOROTHIAZIDE 12.5 MG: 12.5 TABLET ORAL at 18:44

## 2024-01-12 RX ADMIN — SODIUM CHLORIDE 40 MG: 9 INJECTION, SOLUTION INTRAVENOUS at 12:49

## 2024-01-12 RX ADMIN — SODIUM CHLORIDE 40 MG: 9 INJECTION, SOLUTION INTRAVENOUS at 22:22

## 2024-01-12 RX ADMIN — CITALOPRAM 10 MG: 10 TABLET, FILM COATED ORAL at 09:54

## 2024-01-12 RX ADMIN — Medication 10 ML: at 09:54

## 2024-01-12 RX ADMIN — SODIUM CHLORIDE 40 MG: 9 INJECTION, SOLUTION INTRAVENOUS at 17:20

## 2024-01-12 RX ADMIN — SODIUM CHLORIDE 40 MG: 9 INJECTION, SOLUTION INTRAVENOUS at 07:54

## 2024-01-12 RX ADMIN — SODIUM CHLORIDE 40 MG: 9 INJECTION, SOLUTION INTRAVENOUS at 02:13

## 2024-01-12 NOTE — PROGRESS NOTES
[facility]  Inpatient Progress Note    Reason for Follow Up:  GI bleed     Subjective     Interval History:   Patient seen and examined at bedside this morning with daughter present with patient permission.  She reports that she is feeling well today.  She denies shortness of breath, dizziness, fatigue.  She says that she had 2 bowel movements yesterday each of them had dark red blood but the second bowel movement and less than the first.  She has not had a bowel movement yet today.  She denies abdominal pain, nausea, vomiting.  Reports she has been tolerating diet well.    Labs show hemoglobin has trended 9.9-9.3-8.6-9.6.  Hemodynamically stable on exam today.    Current Facility-Administered Medications:     citalopram (CeleXA) tablet 10 mg, 10 mg, Oral, Daily, Elia Almanzar MD, 10 mg at 01/11/24 0919    [COMPLETED] pantoprazole (PROTONIX) injection 80 mg, 80 mg, Intravenous, Once, 80 mg at 01/10/24 1756 **AND** pantoprazole (PROTONIX) 40 mg in 100 mL NS (VTB), 40 mg, Intravenous, Continuous, Elia Almanzar MD, Last Rate: 20 mL/hr at 01/12/24 0754, 40 mg at 01/12/24 0754    rosuvastatin (CRESTOR) tablet 20 mg, 20 mg, Oral, Daily, Elai Almanzar MD    sodium chloride 0.9 % flush 10 mL, 10 mL, Intravenous, PRN, Emergency, Triage Protocol, MD    [COMPLETED] Insert Peripheral IV, , , Once **AND** sodium chloride 0.9 % flush 10 mL, 10 mL, Intravenous, PRN, Noelle Kearns MD, 10 mL at 01/11/24 0919      Objective     Vital Signs  Temp:  [97.5 °F (36.4 °C)-98.6 °F (37 °C)] 97.9 °F (36.6 °C)  Heart Rate:  [78-88] 88  Resp:  [18] 18  BP: (131-151)/(54-80) 151/54  Body mass index is 22.6 kg/m².  No intake or output data in the 24 hours ending 01/12/24 0836  No intake/output data recorded.     Physical Exam:   General: patient awake, alert and cooperative   Eyes: Normal lids and lashes, no scleral icterus   Neck: supple, normal ROM   Skin: warm and dry, not jaundiced   Cardiovascular: regular  "rhythm and rate   Pulm: regular and unlabored   Abdomen: soft, nontender, nondistended; normal bowel sounds     Results Review:     I reviewed the patient's new clinical results.    Results from last 7 days   Lab Units 01/12/24  0634 01/11/24  2351 01/11/24  1606 01/11/24  0157 01/10/24  1331   WBC 10*3/mm3  --   --   --   --  3.94   HEMOGLOBIN g/dL 9.6* 8.6* 9.3*   < > 11.6*   HEMATOCRIT % 28.4* 25.5* 28.5*   < > 35.9   PLATELETS 10*3/mm3  --   --   --   --  225    < > = values in this interval not displayed.     Results from last 7 days   Lab Units 01/10/24  1331   SODIUM mmol/L 139   POTASSIUM mmol/L 3.9   CHLORIDE mmol/L 100   CO2 mmol/L 27.0   BUN mg/dL 17   CREATININE mg/dL 0.81   CALCIUM mg/dL 9.5   BILIRUBIN mg/dL 0.3   ALK PHOS U/L 96   ALT (SGPT) U/L 10   AST (SGOT) U/L 17   GLUCOSE mg/dL 108*     Results from last 7 days   Lab Units 01/10/24  1331   INR  0.99     No results found for: \"LIPASE\"      Radiology:  CT Abdomen Pelvis With Contrast   Final Result   1. Within the left aspect of a redundant sigmoid colon in the left lower   pelvis there is increased density material potentially representing a   small area of active hemorrhage into the colon though there is no   further evidence to suggest hemorrhage (image 103). No noncontrasted   imaging was performed to confirm this dense material was pre-existing.   There is extensive diverticulosis greatest involving the sigmoid colon.   No evidence for bowel obstruction.   2. Cholelithiasis.   3. Atherosclerotic disease. Moderate celiac and SMA origin stenoses.       Discussed with Dr. Kearns in the emergency department on 01/10/2024 at   3:30 p.m.        Radiation dose reduction techniques were utilized, including automated   exposure control and exposure modulation based on body size.           This report was finalized on 1/10/2024 4:33 PM by Dr. Sylvain Arizmendi M.D on Workstation: [id number]              Assessment & Plan     Active Hospital Problems "    Diagnosis     **Acute lower GI bleeding     Stenosis of right carotid artery     PVD (peripheral vascular disease)     Hyperlipidemia     Hypertension        Assessment:  Hematochezia   Diverticular disease  PVD on Plavix, held  Prior incomplete colonoscopy due to severely redundant colon    All problems are new to me today     Plan:  Hemoglobin appears stable and patient is hemodynamically stable on exam today.  If there is further bleeding or significant decline in hemoglobin recommend tagged RBC scan versus CTA  Patient does have prior failed colonoscopy due to severe diverticular disease and redundant anatomy, per discussion with attending it is unlikely that endoscopic evaluation will be successful in her case  Blood in stool appears to be tapering down.  At this time GI team will sign off.  Please not hesitate to call back if needed    Patient plan of care discussed with attending, Dr. Tung Lim PA-C

## 2024-01-12 NOTE — PLAN OF CARE
Goal Outcome Evaluation:      Pt alert and oriented. VSS. On RA. NSR on tele. Pt's last hemoglobin 8.6. no acute distress noted.

## 2024-01-12 NOTE — PROGRESS NOTES
Name: Cristela Peralta ADMIT: 1/10/2024   : 1928  PCP: Arelis Lara MD    MRN: [id number] LOS: 2 days   AGE/SEX: 89 y.o. female  ROOM: [facility]     Subjective   No new changes. Hb decreased to 8.0 this AM    Objective   Objective   Vital Signs  Temp:  [97.3 °F (36.3 °C)-97.9 °F (36.6 °C)] 97.3 °F (36.3 °C)  Heart Rate:  [79-88] 85  Resp:  [18] 18  BP: (131-154)/(54-80) 154/57  SpO2:  [95 %-99 %] 99 %  on   ;   Device (Oxygen Therapy): room air  Body mass index is 22.6 kg/m².  Physical Exam  Constitutional:       General: She is not in acute distress.  HENT:      Head: Normocephalic and atraumatic.   Cardiovascular:      Rate and Rhythm: Normal rate and regular rhythm.   Pulmonary:      Effort: Pulmonary effort is normal. No respiratory distress.   Abdominal:      General: There is no distension.      Palpations: Abdomen is soft.      Tenderness: There is no abdominal tenderness.   Neurological:      General: No focal deficit present.      Mental Status: She is alert and oriented to person, place, and time.         Results Review     I reviewed the patient's new clinical results.  Results from last 7 days   Lab Units 24  1607 24  0634 24  2351 24  1606 24  0157 01/10/24  1331   WBC 10*3/mm3  --   --   --   --   --  3.94   HEMOGLOBIN g/dL 8.0* 9.6* 8.6* 9.3*   < > 11.6*   PLATELETS 10*3/mm3  --   --   --   --   --  225    < > = values in this interval not displayed.     Results from last 7 days   Lab Units 01/10/24  1331   SODIUM mmol/L 139   POTASSIUM mmol/L 3.9   CHLORIDE mmol/L 100   CO2 mmol/L 27.0   BUN mg/dL 17   CREATININE mg/dL 0.81   GLUCOSE mg/dL 108*   Estimated Creatinine Clearance: 41.6 mL/min (by C-G formula based on SCr of 0.81 mg/dL).  Results from last 7 days   Lab Units 01/10/24  1331   ALBUMIN g/dL 4.3   BILIRUBIN mg/dL 0.3   ALK PHOS U/L 96   AST (SGOT) U/L 17   ALT (SGPT) U/L 10     Results from last 7 days   Lab Units 01/10/24  1331   CALCIUM mg/dL  "9.5   ALBUMIN g/dL 4.3     No results found for: \"COVID19\"  No results found for: \"HGBA1C\", \"POCGLU\"  No results found for this or any previous visit.      CT Abdomen Pelvis With Contrast  Narrative: CT ABDOMEN AND PELVIS WITH IV CONTRAST     HISTORY: Rectal bleeding. Bloody stools since yesterday.     TECHNIQUE:  CT includes axial imaging from the lung bases to the  trochanters with intravenous contrast and without use of oral contrast.  Data reconstructed in coronal and sagittal planes. Radiation dose  reduction techniques were utilized, including automated exposure control  and exposure modulation based on body size.     COMPARISON: CT angiogram abdomen and pelvis 04/24/2023.     FINDINGS: Calcified nodule is present in the left lower lobe. Liver,  spleen, adrenal glands, pancreas appear within normal limits. There is a  9 mm posterior right renal low-density lesion which is most likely a  cyst. There is no renal stone or evidence for obstructive uropathy.  Small gallstones are present. There is diffuse atherosclerotic disease  and there appear to be moderate stenoses involving the origins of the  celiac and superior mesenteric arteries.     There is colonic diverticulosis which is severe involving the sigmoid  colon. There is no evidence for diverticulitis. Within the redundant  sigmoid colon within the left lower pelvis there is dense material  within the lumen of the sigmoid colon as demonstrated on axial image  103. No other dense material is present in the colon and this could  represent a small area of active extravasation or hemorrhage into the  colon. There is no hematoma or fluid distention of the colon or further  evidence for hemorrhage.     The bones are osteopenic. There is a scoliotic curvature of the lumbar  spine associated with multilevel degenerative disc disease and facet  arthritis.     Impression: 1. Within the left aspect of a redundant sigmoid colon in the left lower  pelvis there is " increased density material potentially representing a  small area of active hemorrhage into the colon though there is no  further evidence to suggest hemorrhage (image 103). No noncontrasted  imaging was performed to confirm this dense material was pre-existing.  There is extensive diverticulosis greatest involving the sigmoid colon.  No evidence for bowel obstruction.  2. Cholelithiasis.  3. Atherosclerotic disease. Moderate celiac and SMA origin stenoses.     Discussed with Dr. Kearns in the emergency department on 01/10/2024 at  3:30 p.m.      Radiation dose reduction techniques were utilized, including automated  exposure control and exposure modulation based on body size.        This report was finalized on 1/10/2024 4:33 PM by Dr. Sylvain Arizmendi M.D on Workstation: [id number]       Scheduled Medications  citalopram, 10 mg, Oral, Daily  rosuvastatin, 20 mg, Oral, Daily    Infusions  pantoprazole, 40 mg, Last Rate: 40 mg (01/12/24 1249)    Diet  Diet: Regular/House Diet; Texture: Regular Texture (IDDSI 7); Fluid Consistency: Thin (IDDSI 0)       Assessment/Plan     Active Hospital Problems    Diagnosis  POA    **Acute lower GI bleeding [K92.2]  Yes    Stenosis of right carotid artery [I65.21]  Yes    PVD (peripheral vascular disease) [I73.9]  Yes    Hyperlipidemia [E78.5]  Yes    Hypertension [I10]  Yes      Resolved Hospital Problems   No resolved problems to display.       89 y.o. female admitted with Acute lower GI bleeding.    Acute GI bleed  Has been given pantoprazole IV x 1 in the emergency department.  Started on PPI ggt  GI consulted, previous colonoscopy was had failed due to abnormal anatomy and severe diverticular disease.  If her hemoglobin decreases significantly or if there is further bleeding she will need a CT angiogram versus a tagged scan.  Continue holding Plavix.  Monitor hemoglobin every 8 hours, most recently down to 8.  Hold NSAIDs, aspirin, Plavix     Peripheral vascular  disease  Right carotid artery stenosis  Hold aspirin and Plavix for now.  Last dose was on 1/9  She would like to speak to her vascular surgeon, Dr. De La Cruz regarding discontinuation of Plavix.   Conversation of the patient had with vascular surgery has been noted.  Recommendation is that she can be off the Plavix, and can discharge on aspirin and current dose of Plavix and follow-up with Dr. Gibson as an outpatient.    Hypertension  Hold off on hydrochlorothiazide due to active GI bleed    SCDs for DVT prophylaxis.  Full code.  Discussed with patient and nursing staff.  Anticipate discharge home in 1-2 days.      Elia Almanzar MD  [facility]  01/12/24  16:56 EST

## 2024-01-12 NOTE — PLAN OF CARE
Goal Outcome Evaluation:  Plan of Care Reviewed With: patient           Outcome Evaluation: No visible blood reported with BM but patient states her stool was dark. Denies pain or weakness. H/H's noted. Protonix drip infusing. Room air. Telemetry SR w/ 1*AVB./SR.

## 2024-01-13 ENCOUNTER — READMISSION MANAGEMENT (OUTPATIENT)
Dept: CALL CENTER | Facility: HOSPITAL | Age: 89
End: 2024-01-13
Payer: OTHER GOVERNMENT

## 2024-01-13 VITALS
HEART RATE: 89 BPM | HEIGHT: 66 IN | RESPIRATION RATE: 18 BRPM | BODY MASS INDEX: 22.5 KG/M2 | TEMPERATURE: 98.2 F | DIASTOLIC BLOOD PRESSURE: 61 MMHG | SYSTOLIC BLOOD PRESSURE: 143 MMHG | WEIGHT: 140 LBS | OXYGEN SATURATION: 97 %

## 2024-01-13 LAB
ANION GAP SERPL CALCULATED.3IONS-SCNC: 7.3 MMOL/L (ref 5–15)
BUN SERPL-MCNC: 17 MG/DL (ref 8–23)
BUN/CREAT SERPL: 25.4 (ref 7–25)
CALCIUM SPEC-SCNC: 8.9 MG/DL (ref 8.2–9.6)
CHLORIDE SERPL-SCNC: 104 MMOL/L (ref 98–107)
CO2 SERPL-SCNC: 27.7 MMOL/L (ref 22–29)
CREAT SERPL-MCNC: 0.67 MG/DL (ref 0.57–1)
DEPRECATED RDW RBC AUTO: 42.5 FL (ref 37–54)
EGFRCR SERPLBLD CKD-EPI 2021: 80.6 ML/MIN/1.73
ERYTHROCYTE [DISTWIDTH] IN BLOOD BY AUTOMATED COUNT: 12.2 % (ref 12.3–15.4)
GLUCOSE SERPL-MCNC: 92 MG/DL (ref 65–99)
HCT VFR BLD AUTO: 26.2 % (ref 34–46.6)
HCT VFR BLD AUTO: 27.7 % (ref 34–46.6)
HGB BLD-MCNC: 8.5 G/DL (ref 12–15.9)
HGB BLD-MCNC: 8.9 G/DL (ref 12–15.9)
MCH RBC QN AUTO: 30.5 PG (ref 26.6–33)
MCHC RBC AUTO-ENTMCNC: 32.1 G/DL (ref 31.5–35.7)
MCV RBC AUTO: 94.9 FL (ref 79–97)
PLATELET # BLD AUTO: 184 10*3/MM3 (ref 140–450)
PMV BLD AUTO: 9.7 FL (ref 6–12)
POTASSIUM SERPL-SCNC: 4 MMOL/L (ref 3.5–5.2)
RBC # BLD AUTO: 2.92 10*6/MM3 (ref 3.77–5.28)
SODIUM SERPL-SCNC: 139 MMOL/L (ref 136–145)
WBC NRBC COR # BLD AUTO: 3.69 10*3/MM3 (ref 3.4–10.8)

## 2024-01-13 PROCEDURE — 80048 BASIC METABOLIC PNL TOTAL CA: CPT | Performed by: STUDENT IN AN ORGANIZED HEALTH CARE EDUCATION/TRAINING PROGRAM

## 2024-01-13 PROCEDURE — 85027 COMPLETE CBC AUTOMATED: CPT | Performed by: STUDENT IN AN ORGANIZED HEALTH CARE EDUCATION/TRAINING PROGRAM

## 2024-01-13 RX ORDER — PANTOPRAZOLE SODIUM 40 MG/1
40 TABLET, DELAYED RELEASE ORAL 2 TIMES DAILY
Qty: 60 TABLET | Refills: 0 | Status: SHIPPED | OUTPATIENT
Start: 2024-01-13

## 2024-01-13 RX ADMIN — ROSUVASTATIN CALCIUM 20 MG: 20 TABLET, FILM COATED ORAL at 08:12

## 2024-01-13 RX ADMIN — HYDROCHLOROTHIAZIDE 12.5 MG: 12.5 TABLET ORAL at 08:12

## 2024-01-13 RX ADMIN — SODIUM CHLORIDE 40 MG: 9 INJECTION, SOLUTION INTRAVENOUS at 03:41

## 2024-01-13 RX ADMIN — SODIUM CHLORIDE 40 MG: 9 INJECTION, SOLUTION INTRAVENOUS at 09:21

## 2024-01-13 RX ADMIN — CITALOPRAM 10 MG: 10 TABLET, FILM COATED ORAL at 08:12

## 2024-01-13 NOTE — OUTREACH NOTE
Prep Survey      Flowsheet Row Responses   [facility] patient discharged from? [place]   Is LACE score < 7 ? No   Eligibility [facility]   Date of Admission 01/10/24   Date of Discharge 01/13/24   Discharge Disposition Home or Self Care   Discharge diagnosis Acute lower GI bleeding   Does the patient have one of the following disease processes/diagnoses(primary or secondary)? Other   Does the patient have Home health ordered? No   Is there a DME ordered? No   Prep survey completed? Yes            Jaleesa MÁRQUEZ - Registered Nurse

## 2024-01-13 NOTE — DISCHARGE SUMMARY
Patient Name: Cristela Peralta  : 1928  MRN: [id number]    Date of Admission: 1/10/2024  Date of Discharge:  2024  Primary Care Physician: Arelis Lara MD      Chief Complaint:   Rectal Bleeding      Discharge Diagnoses     Active Hospital Problems    Diagnosis  POA    **Acute lower GI bleeding [K92.2]  Yes    Stenosis of right carotid artery [I65.21]  Yes    PVD (peripheral vascular disease) [I73.9]  Yes    Hyperlipidemia [E78.5]  Yes    Hypertension [I10]  Yes      Resolved Hospital Problems   No resolved problems to display.        Hospital Course       his is a 89-year-old woman with past medical history of hypertension, hyperlipidemia, peripheral vascular disease followed by Dr. De La Cruz on Plavix who presents to the hospital with lower GI bleeding.  Started Plavix 6 days ago and has not taken for the last 2 days.  She reported that she had a bowel movement and passed some brown stool but then started to pass gross blood with blood clots.  Upon presentation to the emergency department she had a stable hemoglobin level of 11.6.  CT of the abdomen pelvis was obtained which showed a increased density material within the aspect of the sigmoid colon which could be active hemorrhage.  GI saw the patient in consultation.  She had failed a colonoscopy previously due to severe diverticular disease and redundant anatomy.  It was recommended that if she experiences significant decline in hemoglobin that she should undergo a tagged red blood cell scan versus CT angiogram.  Fortunately, hemoglobin remained stable over the course of this admission.  Vascular surgery also the patient consultation as Dr. De La Cruz, her vascular surgeon has started the patient on Plavix for right-sided carotid artery stenosis as well as peripheral vascular disease.  The carotid stenosis was asymptomatic and it was recommended that she be discharged on single antiplatelet therapy with aspirin and current dose of atorvastatin.   She should follow-up with her vascular surgeon in the outpatient setting to continue to discuss the ongoing benefit of DAPT in the setting of carotid stenosis/PVD.      Physical Exam:  Temp:  [97.3 °F (36.3 °C)-98.2 °F (36.8 °C)] 98.2 °F (36.8 °C)  Heart Rate:  [79-89] 89  Resp:  [18] 18  BP: (140-154)/(51-61) 143/61  Body mass index is 22.6 kg/m².  Physical Exam    General: Alert and oriented x3, no acute distress  HEENT: Normocephalic, atraumatic  CV: Regular rate and rhythm, no murmurs rubs or gallops  Lungs: Clear to auscultation bilaterally, no crackles or wheezes  Abdomen: Soft, nontender, nondistended  Neuro: CN II-XII intact, no FND appreciated   .  Consultants     Consult Orders (all) (From admission, onward)       Start     Ordered    01/11/24 0207  Inpatient Gastroenterology Consult  Once        Specialty:  Gastroenterology  Provider:  Yanet Obregon MD    01/11/24 0208    01/10/24 2240  Inpatient Vascular Surgery Consult  Once        Specialty:  Vascular Surgery  Provider:  Mark Gibson MD    01/10/24 2240    01/10/24 1523  [facility] (on-call MD unless specified) Details  Once        Specialty:  Hospitalist  Provider:  Elia Almanzar MD    01/10/24 1522                  Procedures     Imaging Results (All)       Procedure Component Value Units Date/Time    CT Abdomen Pelvis With Contrast [[id number]] Collected: 01/10/24 1532     Updated: 01/10/24 1636    Narrative:      CT ABDOMEN AND PELVIS WITH IV CONTRAST     HISTORY: Rectal bleeding. Bloody stools since yesterday.     TECHNIQUE:  CT includes axial imaging from the lung bases to the  trochanters with intravenous contrast and without use of oral contrast.  Data reconstructed in coronal and sagittal planes. Radiation dose  reduction techniques were utilized, including automated exposure control  and exposure modulation based on body size.     COMPARISON: CT angiogram abdomen and pelvis 04/24/2023.     FINDINGS: Calcified nodule is present in the left  lower lobe. Liver,  spleen, adrenal glands, pancreas appear within normal limits. There is a  9 mm posterior right renal low-density lesion which is most likely a  cyst. There is no renal stone or evidence for obstructive uropathy.  Small gallstones are present. There is diffuse atherosclerotic disease  and there appear to be moderate stenoses involving the origins of the  celiac and superior mesenteric arteries.     There is colonic diverticulosis which is severe involving the sigmoid  colon. There is no evidence for diverticulitis. Within the redundant  sigmoid colon within the left lower pelvis there is dense material  within the lumen of the sigmoid colon as demonstrated on axial image  103. No other dense material is present in the colon and this could  represent a small area of active extravasation or hemorrhage into the  colon. There is no hematoma or fluid distention of the colon or further  evidence for hemorrhage.     The bones are osteopenic. There is a scoliotic curvature of the lumbar  spine associated with multilevel degenerative disc disease and facet  arthritis.       Impression:      1. Within the left aspect of a redundant sigmoid colon in the left lower  pelvis there is increased density material potentially representing a  small area of active hemorrhage into the colon though there is no  further evidence to suggest hemorrhage (image 103). No noncontrasted  imaging was performed to confirm this dense material was pre-existing.  There is extensive diverticulosis greatest involving the sigmoid colon.  No evidence for bowel obstruction.  2. Cholelithiasis.  3. Atherosclerotic disease. Moderate celiac and SMA origin stenoses.     Discussed with Dr. Kearns in the emergency department on 01/10/2024 at  3:30 p.m.      Radiation dose reduction techniques were utilized, including automated  exposure control and exposure modulation based on body size.        This report was finalized on 1/10/2024 4:33 PM  "by Dr. Sylvain Arizmendi M.D on Workstation: [id number]               Pertinent Labs     Results from last 7 days   Lab Units 01/13/24  0807 01/12/24  2351 01/12/24  1607 01/12/24  0634 01/11/24  0157 01/10/24  1331   WBC 10*3/mm3 3.69  --   --   --   --  3.94   HEMOGLOBIN g/dL 8.9* 8.5* 8.0* 9.6*   < > 11.6*   PLATELETS 10*3/mm3 184  --   --   --   --  225    < > = values in this interval not displayed.     Results from last 7 days   Lab Units 01/13/24  0807 01/10/24  1331   SODIUM mmol/L 139 139   POTASSIUM mmol/L 4.0 3.9   CHLORIDE mmol/L 104 100   CO2 mmol/L 27.7 27.0   BUN mg/dL 17 17   CREATININE mg/dL 0.67 0.81   GLUCOSE mg/dL 92 108*   Estimated Creatinine Clearance: 50.3 mL/min (by C-G formula based on SCr of 0.67 mg/dL).  Results from last 7 days   Lab Units 01/10/24  1331   ALBUMIN g/dL 4.3   BILIRUBIN mg/dL 0.3   ALK PHOS U/L 96   AST (SGOT) U/L 17   ALT (SGPT) U/L 10     Results from last 7 days   Lab Units 01/13/24  0807 01/10/24  1331   CALCIUM mg/dL 8.9 9.5   ALBUMIN g/dL  --  4.3               Invalid input(s): \"LDLCALC\"        Test Results Pending at Discharge       Discharge Details        Discharge Medications        New Medications        Instructions Start Date   pantoprazole 40 MG EC tablet  Commonly known as: PROTONIX   40 mg, Oral, 2 Times Daily             Continue These Medications        Instructions Start Date   aspirin 81 MG EC tablet   81 mg, Oral, Daily      hydroCHLOROthiazide 12.5 MG tablet  Commonly known as: HYDRODIURIL   12.5 mg, Oral, Daily      rosuvastatin 20 MG tablet  Commonly known as: Crestor   20 mg, Oral, Daily             Stop These Medications      CALCIUM-MAGNESIUM-VITAMIN D PO     celecoxib 200 MG capsule  Commonly known as: CeleBREX     Centrum Silver tablet  Generic drug: multivitamin with minerals     cetirizine 10 MG tablet  Commonly known as: zyrTEC     citalopram 10 MG tablet  Commonly known as: CeleXA     clopidogrel 75 MG tablet  Commonly known as: Plavix   "   COQ10 PO     Fish Oil Triple Strength 1400 MG capsule     prednisoLONE acetate 1 % ophthalmic suspension  Commonly known as: PRED FORTE     vitamin C 500 MG tablet  Commonly known as: ASCORBIC ACID              Allergies   Allergen Reactions    Neomycin-Bacitracin Zn-Polymyx Rash    Atorvastatin Other (See Comments)     LEG CRAMPS    Pravastatin Other (See Comments)     LEG CRAMPS    Alendronate GI Intolerance    Sulfa Antibiotics Hives and Rash         Discharge Disposition:  Home or Self Care    Discharge Diet:  Diet Order   Procedures    Diet: Regular/House Diet; Texture: Regular Texture (IDDSI 7); Fluid Consistency: Thin (IDDSI 0)       Discharge Activity: as tolerated       CODE STATUS:    Code Status and Medical Interventions:   Ordered at: 01/10/24 1838     Code Status (Patient has no pulse and is not breathing):    CPR (Attempt to Resuscitate)     Medical Interventions (Patient has pulse or is breathing):    Full Support       Future Appointments   Date Time Provider Department Center   6/11/2024 10:30 AM [facility]   6/14/2024  2:15 PM Arelis Lara MD [facility]   12/17/2024 10:20 AM [facility]   12/20/2024  2:15 PM Arelis Lara MD [facility]      Follow-up Information       Arelis Lara MD .    Specialty: Internal Medicine  Contact information:  [address] [id number] [phone]                             Time Spent on Discharge:  Greater than 30 minutes      Elia Almanzar MD  [facility]  01/13/24  09:26 EST

## 2024-01-13 NOTE — CASE MANAGEMENT/SOCIAL WORK
Case Management Discharge Note      Final Note: dc home         Selected Continued Care - Discharged on 1/13/2024 Admission date: 1/10/2024 - Discharge disposition: Home or Self Care      Destination    No services have been selected for the patient.                Durable Medical Equipment    No services have been selected for the patient.                Dialysis/Infusion    No services have been selected for the patient.                Home Medical Care    No services have been selected for the patient.                Therapy    No services have been selected for the patient.                Community Resources    No services have been selected for the patient.                Community & DME    No services have been selected for the patient.                         Final Discharge Disposition Code: 01 - home or self-care

## 2024-01-13 NOTE — PLAN OF CARE
Goal Outcome Evaluation:      Patient resting in bed.VSS.Room air. No bloody stools this shift.Protonix gtt.Last Hgb 8.5.NSR

## 2024-01-15 ENCOUNTER — TRANSITIONAL CARE MANAGEMENT TELEPHONE ENCOUNTER (OUTPATIENT)
Dept: CALL CENTER | Facility: HOSPITAL | Age: 89
End: 2024-01-15
Payer: OTHER GOVERNMENT

## 2024-01-15 NOTE — OUTREACH NOTE
Call Center TCM Note      Flowsheet Row Responses   [facility] patient discharged from? [place]   Does the patient have one of the following disease processes/diagnoses(primary or secondary)? Other   TCM attempt successful? Yes   Call start time 0902   Call end time 0913   Discharge diagnosis Acute lower GI bleeding   Person spoke with today (if not patient) and relationship pt   Meds reviewed with patient/caregiver? Yes   Is the patient having any side effects they believe may be caused by any medication additions or changes? No   Does the patient have all medications ordered at discharge? Yes   Is the patient taking all medications as directed (includes completed medication regime)? Yes   Does the patient have an appointment with their PCP within 7-14 days of discharge? Yes  [1/22/2024 at 1:00 PM]   Psychosocial issues? No   Did the patient receive a copy of their discharge instructions? Yes   Nursing interventions Reviewed instructions with patient   What is the patient's perception of their health status since discharge? Improving   Is the patient/caregiver able to teach back signs and symptoms related to disease process for when to call PCP? Yes   Is the patient/caregiver able to teach back signs and symptoms related to disease process for when to call 911? Yes   Is the patient/caregiver able to teach back the hierarchy of who to call/visit for symptoms/problems? PCP, Specialist, Home health nurse, Urgent Care, ED, 911 Yes   If the patient is a current smoker, are they able to teach back resources for cessation? Not a smoker   TCM call completed? Yes   Wrap up additional comments Pt states she is doing better, and reports old blood with stool. Reviewed AVS/meds with pt. Pt advised to call PCP office if she noticed red blood with BMs. JOSE verified PCP fu appt on 1/22/24.   Call end time 0913   Would this patient benefit from a Referral to [facility] Social Work? No   Is the patient interested in additional  calls from an ambulatory ? No            Asmita Webb RN    1/15/2024, 09:15 EST

## 2024-01-22 ENCOUNTER — OFFICE VISIT (OUTPATIENT)
Dept: INTERNAL MEDICINE | Facility: CLINIC | Age: 89
End: 2024-01-22
Payer: MEDICARE

## 2024-01-22 VITALS
OXYGEN SATURATION: 97 % | BODY MASS INDEX: 22.18 KG/M2 | HEART RATE: 92 BPM | HEIGHT: 66 IN | DIASTOLIC BLOOD PRESSURE: 74 MMHG | TEMPERATURE: 97.8 F | WEIGHT: 138 LBS | SYSTOLIC BLOOD PRESSURE: 120 MMHG

## 2024-01-22 DIAGNOSIS — K92.2 ACUTE LOWER GI BLEEDING: Primary | ICD-10-CM

## 2024-01-22 DIAGNOSIS — D64.9 ANEMIA, UNSPECIFIED TYPE: ICD-10-CM

## 2024-01-22 DIAGNOSIS — M19.90 OSTEOARTHRITIS, UNSPECIFIED OSTEOARTHRITIS TYPE, UNSPECIFIED SITE: ICD-10-CM

## 2024-01-22 DIAGNOSIS — I73.9 PVD (PERIPHERAL VASCULAR DISEASE): ICD-10-CM

## 2024-01-22 PROCEDURE — 1159F MED LIST DOCD IN RCRD: CPT | Performed by: INTERNAL MEDICINE

## 2024-01-22 PROCEDURE — 99495 TRANSJ CARE MGMT MOD F2F 14D: CPT | Performed by: INTERNAL MEDICINE

## 2024-01-22 PROCEDURE — 1160F RVW MEDS BY RX/DR IN RCRD: CPT | Performed by: INTERNAL MEDICINE

## 2024-01-22 PROCEDURE — 1111F DSCHRG MED/CURRENT MED MERGE: CPT | Performed by: INTERNAL MEDICINE

## 2024-01-22 RX ORDER — CELECOXIB 200 MG/1
200 CAPSULE ORAL DAILY PRN
COMMUNITY
Start: 2024-01-22

## 2024-01-22 RX ORDER — CITALOPRAM HYDROBROMIDE 10 MG/1
10 TABLET ORAL DAILY
COMMUNITY

## 2024-01-22 RX ORDER — ACETAMINOPHEN 500 MG
500 TABLET ORAL EVERY 6 HOURS PRN
COMMUNITY

## 2024-01-22 RX ORDER — PREDNISOLONE ACETATE 10 MG/ML
1 SUSPENSION/ DROPS OPHTHALMIC 4 TIMES DAILY
COMMUNITY

## 2024-01-22 NOTE — PROGRESS NOTES
Subjective     Cristela Peralta is a 89 y.o. female who presents with   Chief Complaint   Patient presents with    Hospital Follow Up Visit     GI bleeding       History of Present Illness     The following data was reviewed by: Arelis Lara MD on 01/22/2024:  CMP          12/11/2023    11:00 1/10/2024    13:31 1/13/2024    08:07   CMP   Glucose 96  108  92    BUN 14  17  17    Creatinine 0.75  0.81  0.67    EGFR  66.9  80.6    Sodium 141  139  139    Potassium 4.5  3.9  4.0    Chloride 101  100  104    Calcium 9.1  9.5  8.9    Total Protein 6.7      Total Protein  6.6     Albumin 4.2  4.3     Globulin 2.5      Globulin  2.3     Total Bilirubin 0.4  0.3     Alkaline Phosphatase 87  96     AST (SGOT) 20  17     ALT (SGPT) 9  10     Albumin/Globulin Ratio  1.9     BUN/Creatinine Ratio 19  21.0  25.4    Anion Gap  12.0  7.3      CBC          1/11/2024    01:57 1/11/2024    08:14 1/11/2024    16:06 1/11/2024    23:51 1/12/2024    06:34 1/12/2024    16:07 1/12/2024    23:51 1/13/2024    08:07   CBC   WBC        3.69    RBC        2.92    Hemoglobin 9.3  9.9  9.3  8.6  9.6  8.0  8.5  8.9    Hematocrit 27.8  30.2  28.5  25.5  28.4  24.4  26.2  27.7    MCV        94.9    MCH        30.5    MCHC        32.1    RDW        12.2    Platelets        184      INR          1/10/2024    13:31   Common Labsle   INR 0.99      Patient presents in hospital f/u.  She was admitted for acute GI bleeding.  I have reviewed discharge summary, labs, scans, cardiovascular studies and medication changes.  She had recently started Plavix.  She was stopped.  No further bleeding.       Review of Systems   Respiratory: Negative.     Cardiovascular: Negative.        The following portions of the patient's history were reviewed and updated as appropriate: allergies, current medications and problem list.    Patient Active Problem List    Diagnosis Date Noted    Acute lower GI bleeding 01/10/2024    Stenosis of right carotid artery 12/16/2023     "PVD (peripheral vascular disease) 06/24/2019     Note Last Updated: 6/24/2019     By humana screen      Chronic depression 10/24/2018    Vitamin D deficiency 10/14/2016    Diastolic dysfunction 05/13/2016    Hyperlipidemia 05/13/2016    Hypertension 05/13/2016    Chronic low back pain 05/13/2016    Osteoarthritis 05/13/2016    Other osteoporosis without current pathological fracture 05/13/2016     Note Last Updated: 6/13/2023     S/p five years of bisphosphonates.  Prolia started in 2023/.           Current Outpatient Medications on File Prior to Visit   Medication Sig Dispense Refill    acetaminophen (TYLENOL) 500 MG tablet Take 1 tablet by mouth Every 6 (Six) Hours As Needed for Mild Pain.      aspirin 81 MG EC tablet Take 1 tablet by mouth Daily. 90 tablet 3    citalopram (CeleXA) 10 MG tablet Take 1 tablet by mouth Daily.      hydroCHLOROthiazide (HYDRODIURIL) 12.5 MG tablet TAKE 1 TABLET EVERY DAY 90 tablet 3    pantoprazole (PROTONIX) 40 MG EC tablet Take 1 tablet by mouth 2 (Two) Times a Day. 60 tablet 0    prednisoLONE acetate (PRED FORTE) 1 % ophthalmic suspension 1 drop 4 (Four) Times a Day.      celecoxib (CeleBREX) 200 MG capsule Take 1 capsule by mouth Daily As Needed for Moderate Pain.      rosuvastatin (Crestor) 20 MG tablet Take 1 tablet by mouth Daily. (Patient not taking: Reported on 1/22/2024)       No current facility-administered medications on file prior to visit.       Objective     /74   Pulse 92   Temp 97.8 °F (36.6 °C) (Oral)   Ht 167.6 cm (66\")   Wt 62.6 kg (138 lb)   LMP  (LMP Unknown) Comment: TOTAL  SpO2 97%   BMI 22.27 kg/m²     Physical Exam  Constitutional:       Appearance: She is well-developed.   HENT:      Head: Normocephalic and atraumatic.   Pulmonary:      Effort: Pulmonary effort is normal.   Neurological:      Mental Status: She is alert and oriented to person, place, and time.   Psychiatric:         Behavior: Behavior normal.         Assessment & Plan "   Diagnoses and all orders for this visit:    1. Acute lower GI bleeding (Primary)    2. PVD (peripheral vascular disease)    3. Anemia, unspecified type  -     CBC & Differential    4. Osteoarthritis, unspecified osteoarthritis type, unspecified site        Discussion  F/U acute lower GI bleeding.  She will continue off Plavix and continue aspirin for PVD.  She wants to use Celebrex for quality of life but will use sparingly for her chronic arthritis.  Check CBC to follow anemia.         Future Appointments   Date Time Provider Department Center   6/11/2024 10:30 AM [facility]   6/14/2024  2:15 PM Arelis Lara MD [facility]   12/17/2024 10:20 AM [facility]   12/20/2024  2:15 PM Arelis Lara MD [facility]

## 2024-01-23 LAB
BASOPHILS # BLD AUTO: 0.03 10*3/MM3 (ref 0–0.2)
BASOPHILS NFR BLD AUTO: 0.8 % (ref 0–1.5)
EOSINOPHIL # BLD AUTO: 0.09 10*3/MM3 (ref 0–0.4)
EOSINOPHIL NFR BLD AUTO: 2.4 % (ref 0.3–6.2)
ERYTHROCYTE [DISTWIDTH] IN BLOOD BY AUTOMATED COUNT: 12.4 % (ref 12.3–15.4)
HCT VFR BLD AUTO: 28.8 % (ref 34–46.6)
HGB BLD-MCNC: 9.2 G/DL (ref 12–15.9)
IMM GRANULOCYTES # BLD AUTO: 0.01 10*3/MM3 (ref 0–0.05)
IMM GRANULOCYTES NFR BLD AUTO: 0.3 % (ref 0–0.5)
LYMPHOCYTES # BLD AUTO: 1.1 10*3/MM3 (ref 0.7–3.1)
LYMPHOCYTES NFR BLD AUTO: 29.2 % (ref 19.6–45.3)
MCH RBC QN AUTO: 30.4 PG (ref 26.6–33)
MCHC RBC AUTO-ENTMCNC: 31.9 G/DL (ref 31.5–35.7)
MCV RBC AUTO: 95 FL (ref 79–97)
MONOCYTES # BLD AUTO: 0.37 10*3/MM3 (ref 0.1–0.9)
MONOCYTES NFR BLD AUTO: 9.8 % (ref 5–12)
NEUTROPHILS # BLD AUTO: 2.17 10*3/MM3 (ref 1.7–7)
NEUTROPHILS NFR BLD AUTO: 57.5 % (ref 42.7–76)
NRBC BLD AUTO-RTO: 0 /100 WBC (ref 0–0.2)
PLATELET # BLD AUTO: 306 10*3/MM3 (ref 140–450)
RBC # BLD AUTO: 3.03 10*6/MM3 (ref 3.77–5.28)
VIT B12 SERPL-MCNC: 192 PG/ML (ref 211–946)
WBC # BLD AUTO: 3.77 10*3/MM3 (ref 3.4–10.8)

## 2024-01-24 DIAGNOSIS — M81.8 OTHER OSTEOPOROSIS WITHOUT CURRENT PATHOLOGICAL FRACTURE: Primary | ICD-10-CM

## 2024-01-24 DIAGNOSIS — D64.9 ANEMIA, UNSPECIFIED TYPE: Primary | ICD-10-CM

## 2024-01-24 PROBLEM — E53.8 B12 DEFICIENCY: Status: ACTIVE | Noted: 2024-01-24

## 2024-01-24 RX ORDER — CYANOCOBALAMIN 1000 UG/ML
1000 INJECTION, SOLUTION INTRAMUSCULAR; SUBCUTANEOUS
Qty: 10 ML | Refills: 11 | Status: SHIPPED | OUTPATIENT
Start: 2024-01-24

## 2024-01-24 RX ORDER — NEEDLES, SAFETY 22GX1 1/2"
1 NEEDLE, DISPOSABLE MISCELLANEOUS
Qty: 3 EACH | Refills: 3 | Status: SHIPPED | OUTPATIENT
Start: 2024-01-24

## 2024-02-02 ENCOUNTER — INFUSION (OUTPATIENT)
Dept: ONCOLOGY | Facility: HOSPITAL | Age: 89
End: 2024-02-02
Payer: MEDICARE

## 2024-02-02 VITALS — RESPIRATION RATE: 16 BRPM | TEMPERATURE: 97.3 F

## 2024-02-02 DIAGNOSIS — M81.8 OTHER OSTEOPOROSIS WITHOUT CURRENT PATHOLOGICAL FRACTURE: Primary | ICD-10-CM

## 2024-02-02 PROCEDURE — 25010000002 DENOSUMAB 60 MG/ML SOLUTION PREFILLED SYRINGE: Performed by: INTERNAL MEDICINE

## 2024-02-02 PROCEDURE — 96372 THER/PROPH/DIAG INJ SC/IM: CPT

## 2024-02-02 RX ADMIN — DENOSUMAB 60 MG: 60 INJECTION SUBCUTANEOUS at 15:19

## 2024-02-06 LAB
BASOPHILS # BLD AUTO: 0.02 10*3/MM3 (ref 0–0.2)
BASOPHILS NFR BLD AUTO: 0.6 % (ref 0–1.5)
EOSINOPHIL # BLD AUTO: 0.09 10*3/MM3 (ref 0–0.4)
EOSINOPHIL NFR BLD AUTO: 2.9 % (ref 0.3–6.2)
ERYTHROCYTE [DISTWIDTH] IN BLOOD BY AUTOMATED COUNT: 12.4 % (ref 12.3–15.4)
HCT VFR BLD AUTO: 31.5 % (ref 34–46.6)
HGB BLD-MCNC: 10 G/DL (ref 12–15.9)
IMM GRANULOCYTES # BLD AUTO: 0 10*3/MM3 (ref 0–0.05)
IMM GRANULOCYTES NFR BLD AUTO: 0 % (ref 0–0.5)
LYMPHOCYTES # BLD AUTO: 0.94 10*3/MM3 (ref 0.7–3.1)
LYMPHOCYTES NFR BLD AUTO: 30.5 % (ref 19.6–45.3)
MCH RBC QN AUTO: 31 PG (ref 26.6–33)
MCHC RBC AUTO-ENTMCNC: 31.7 G/DL (ref 31.5–35.7)
MCV RBC AUTO: 97.5 FL (ref 79–97)
MONOCYTES # BLD AUTO: 0.29 10*3/MM3 (ref 0.1–0.9)
MONOCYTES NFR BLD AUTO: 9.4 % (ref 5–12)
NEUTROPHILS # BLD AUTO: 1.74 10*3/MM3 (ref 1.7–7)
NEUTROPHILS NFR BLD AUTO: 56.6 % (ref 42.7–76)
NRBC BLD AUTO-RTO: 0 /100 WBC (ref 0–0.2)
PLATELET # BLD AUTO: 218 10*3/MM3 (ref 140–450)
RBC # BLD AUTO: 3.23 10*6/MM3 (ref 3.77–5.28)
WBC # BLD AUTO: 3.08 10*3/MM3 (ref 3.4–10.8)

## 2024-02-13 ENCOUNTER — TELEPHONE (OUTPATIENT)
Dept: INTERNAL MEDICINE | Facility: CLINIC | Age: 89
End: 2024-02-13
Payer: OTHER GOVERNMENT

## 2024-02-13 RX ORDER — PANTOPRAZOLE SODIUM 40 MG/1
40 TABLET, DELAYED RELEASE ORAL 2 TIMES DAILY
Qty: 60 TABLET | Refills: 0 | Status: SHIPPED | OUTPATIENT
Start: 2024-02-13

## 2024-03-04 DIAGNOSIS — I65.23 BILATERAL CAROTID ARTERY STENOSIS: Primary | ICD-10-CM

## 2024-03-06 RX ORDER — NEEDLES, SAFETY 22GX1 1/2"
1 NEEDLE, DISPOSABLE MISCELLANEOUS
Qty: 3 EACH | Refills: 3 | Status: SHIPPED | OUTPATIENT
Start: 2024-03-06

## 2024-03-20 RX ORDER — SYRINGE-NEEDLE,INSULIN,0.5 ML 28GX1/2"
SYRINGE, EMPTY DISPOSABLE MISCELLANEOUS
Qty: 10 EACH | Refills: 1 | Status: SHIPPED | OUTPATIENT
Start: 2024-03-20

## 2024-03-21 ENCOUNTER — OFFICE VISIT (OUTPATIENT)
Age: 89
End: 2024-03-21
Payer: OTHER GOVERNMENT

## 2024-03-21 ENCOUNTER — HOSPITAL ENCOUNTER (OUTPATIENT)
Facility: HOSPITAL | Age: 89
Discharge: HOME | End: 2024-03-21
Payer: MEDICARE

## 2024-03-21 VITALS
HEIGHT: 66 IN | SYSTOLIC BLOOD PRESSURE: 168 MMHG | BODY MASS INDEX: 22.18 KG/M2 | DIASTOLIC BLOOD PRESSURE: 72 MMHG | WEIGHT: 138 LBS

## 2024-03-21 DIAGNOSIS — I65.23 BILATERAL CAROTID ARTERY STENOSIS: Primary | ICD-10-CM

## 2024-03-21 DIAGNOSIS — I65.23 BILATERAL CAROTID ARTERY STENOSIS: ICD-10-CM

## 2024-03-21 DIAGNOSIS — I73.9 PAD (PERIPHERAL ARTERY DISEASE): ICD-10-CM

## 2024-03-21 PROCEDURE — 93880 EXTRACRANIAL BILAT STUDY: CPT

## 2024-03-21 RX ORDER — ROSUVASTATIN CALCIUM 20 MG/1
20 TABLET, COATED ORAL DAILY
Start: 2024-03-21

## 2024-03-21 NOTE — PROGRESS NOTES
"Chief Complaint  Carotid Artery Disease    Subjective        Cristela Peralta presents to [facility] VASCULAR SURGERY  HPI   Cristela Peralta is a 89 y.o. female that has been followed in our office for peripheral arterial disease and carotid artery stenosis.  She returns in 3-month follow-up along with a carotid duplex.  At the last visit, Dr. Gibson had started her on Plavix for dual antiplatelet management of her carotid stenosis.  This was in preparation for possible TCAR.  Unfortunately, the patient took this medication for 3 days and developed a GI bleed and was admitted to the hospital.  She is now back on aspirin alone.  She denies any symptoms consistent with CVA, TIA, amaurosis fugax.  She denies any claudication symptoms, rest pain, tissue loss.    Review of Systems   Constitutional:  Negative for fever.   Eyes:  Negative for visual disturbance.   Cardiovascular:  Negative for leg swelling.   Gastrointestinal:  Negative for abdominal pain.   Musculoskeletal:  Negative for back pain.   Skin:  Negative for color change, pallor and wound.   Neurological:  Negative for dizziness, facial asymmetry, speech difficulty and weakness.        Cristela Peralta  reports that she has never smoked. She has been exposed to tobacco smoke. She has never used smokeless tobacco..        Objective   Vital Signs:  Vitals:    03/21/24 1201   BP: 168/72      Body mass index is 22.27 kg/m².   Estimated body mass index is 22.27 kg/m² as calculated from the following:    Height as of this encounter: 167.6 cm (66\").    Weight as of this encounter: 62.6 kg (138 lb).       BMI is within normal parameters. No other follow-up for BMI required.       Physical Exam  Vitals reviewed.   Constitutional:       Appearance: Normal appearance.   HENT:      Head: Normocephalic.   Cardiovascular:      Rate and Rhythm: Normal rate and regular rhythm.      Pulses:           Dorsalis pedis pulses are detected w/ Doppler on the " right side and detected w/ Doppler on the left side.        Posterior tibial pulses are detected w/ Doppler on the right side and detected w/ Doppler on the left side.   Pulmonary:      Effort: Pulmonary effort is normal.   Skin:     General: Skin is warm.   Neurological:      General: No focal deficit present.      Mental Status: She is alert and oriented to person, place, and time.   Psychiatric:         Mood and Affect: Mood normal.          Result Review :    The following data was reviewed by: DERRICK Magaña on 03/21/2024:  Carotid duplex done today shows a greater than 70% stenosis on the right with a peak systolic velocity of 463 and end-diastolic velocity of 133.  Her ratio was 5.9.  On the left, she has a less than 50% stenosis bilaterally.    Her CT angiogram was reviewed from December 2023.  On the right, she had a 67 to 70% stenosis to the right internal carotid artery.  On the left, she had a 35 to 40% stenosis.    Her ABIs were reviewed from 2023.  On the right, this was 0.45 and the left 0.52.  CT angiogram of her abdomen and pelvis in 2023 that showed extensive atherosclerotic disease in the abdominal aorta as well as multifocal short and long segment severe stenosis throughout the bilateral SFA.  She also had severe celiac artery stenosis.    I reviewed the discharge summary from her hospitalization in January 2024.                     Assessment and Plan     Diagnoses and all orders for this visit:    1. Bilateral carotid artery stenosis (Primary)  -     Duplex Carotid Ultrasound CAR; Future    2. PAD (peripheral artery disease)  -     Doppler Ankle Brachial Index Single Level CAR; Future    Other orders  -     rosuvastatin (Crestor) 20 MG tablet; Take 1 tablet by mouth Daily.    Patient with severe right carotid stenosis.  This is greater than 70% on duplex imaging today.  Dr. Gibson had discussed the possibility of a right TCAR due to her advanced age.  Unfortunately, she not able to  tolerate dual antiplatelet therapy therefore that would not be an option for her.  We discussed open repair, though she is at advanced age and understands this.  She reports she and her daughter will discuss this, though likely we will continue to treat this medically.  He is continue her aspirin.  I have refilled her statin today.  We will plan to check her ABIs next visit with her carotid duplex in 3 months.         Follow Up     Return in about 3 months (around 6/21/2024) for carotid duplex, tamar.  Patient was given instructions and counseling regarding her condition or for health maintenance advice. Please see specific information pulled into the AVS if appropriate.     Tigist Garces, DERRICK

## 2024-03-24 LAB
BH CV XLRA MEAS LEFT CAROTID BULB EDV: 31.4 CM/SEC
BH CV XLRA MEAS LEFT CAROTID BULB PSV: 123 CM/SEC
BH CV XLRA MEAS LEFT DIST CCA EDV: 31.4 CM/SEC
BH CV XLRA MEAS LEFT DIST CCA PSV: 126.5 CM/SEC
BH CV XLRA MEAS LEFT DIST ICA EDV: -35.4 CM/SEC
BH CV XLRA MEAS LEFT DIST ICA PSV: -100.6 CM/SEC
BH CV XLRA MEAS LEFT ICA/CCA RATIO: 1.16
BH CV XLRA MEAS LEFT MID CCA EDV: 33.8 CM/SEC
BH CV XLRA MEAS LEFT MID CCA PSV: 112.4 CM/SEC
BH CV XLRA MEAS LEFT MID ICA EDV: -35.4 CM/SEC
BH CV XLRA MEAS LEFT MID ICA PSV: -105.3 CM/SEC
BH CV XLRA MEAS LEFT PROX CCA EDV: 20.4 CM/SEC
BH CV XLRA MEAS LEFT PROX CCA PSV: 95.9 CM/SEC
BH CV XLRA MEAS LEFT PROX ECA PSV: -173.8 CM/SEC
BH CV XLRA MEAS LEFT PROX ICA EDV: -41.3 CM/SEC
BH CV XLRA MEAS LEFT PROX ICA PSV: -146.3 CM/SEC
BH CV XLRA MEAS LEFT PROX SCLA PSV: 148 CM/SEC
BH CV XLRA MEAS LEFT VERTEBRAL A EDV: -17.3 CM/SEC
BH CV XLRA MEAS LEFT VERTEBRAL A PSV: -77 CM/SEC
BH CV XLRA MEAS RIGHT CAROTID BULB EDV: 105 CM/SEC
BH CV XLRA MEAS RIGHT CAROTID BULB PSV: 320 CM/SEC
BH CV XLRA MEAS RIGHT DIST CCA EDV: 15.2 CM/SEC
BH CV XLRA MEAS RIGHT DIST CCA PSV: 78.6 CM/SEC
BH CV XLRA MEAS RIGHT DIST ICA EDV: -26.7 CM/SEC
BH CV XLRA MEAS RIGHT DIST ICA PSV: -72.3 CM/SEC
BH CV XLRA MEAS RIGHT ICA/CCA RATIO: 5.9
BH CV XLRA MEAS RIGHT MID CCA EDV: 15.8 CM/SEC
BH CV XLRA MEAS RIGHT MID CCA PSV: 68.6 CM/SEC
BH CV XLRA MEAS RIGHT MID ICA EDV: 18.9 CM/SEC
BH CV XLRA MEAS RIGHT MID ICA PSV: 66.8 CM/SEC
BH CV XLRA MEAS RIGHT PROX CCA EDV: 13.5 CM/SEC
BH CV XLRA MEAS RIGHT PROX CCA PSV: 74.5 CM/SEC
BH CV XLRA MEAS RIGHT PROX ECA PSV: -129.6 CM/SEC
BH CV XLRA MEAS RIGHT PROX ICA EDV: -133.6 CM/SEC
BH CV XLRA MEAS RIGHT PROX ICA PSV: -463.6 CM/SEC
BH CV XLRA MEAS RIGHT PROX SCLA PSV: 92.7 CM/SEC
BH CV XLRA MEAS RIGHT VERTEBRAL A EDV: 27.5 CM/SEC
BH CV XLRA MEAS RIGHT VERTEBRAL A PSV: 110 CM/SEC
LEFT ARM BP: NORMAL MMHG
RIGHT ARM BP: NORMAL MMHG

## 2024-03-26 ENCOUNTER — TELEPHONE (OUTPATIENT)
Dept: INTERNAL MEDICINE | Facility: CLINIC | Age: 89
End: 2024-03-26
Payer: OTHER GOVERNMENT

## 2024-03-26 RX ORDER — HYDROCHLOROTHIAZIDE 12.5 MG/1
12.5 TABLET ORAL DAILY
Qty: 90 TABLET | Refills: 1 | Status: SHIPPED | OUTPATIENT
Start: 2024-03-26

## 2024-03-26 RX ORDER — ROSUVASTATIN CALCIUM 20 MG/1
20 TABLET, COATED ORAL DAILY
Qty: 90 TABLET | Refills: 1
Start: 2024-03-26

## 2024-03-26 RX ORDER — CELECOXIB 200 MG/1
200 CAPSULE ORAL DAILY PRN
Qty: 90 CAPSULE | Refills: 1 | Status: SHIPPED | OUTPATIENT
Start: 2024-03-26

## 2024-03-26 RX ORDER — PANTOPRAZOLE SODIUM 40 MG/1
40 TABLET, DELAYED RELEASE ORAL 2 TIMES DAILY
Qty: 60 TABLET | Refills: 5 | Status: SHIPPED | OUTPATIENT
Start: 2024-03-26

## 2024-03-26 RX ORDER — CITALOPRAM HYDROBROMIDE 10 MG/1
10 TABLET ORAL DAILY
Qty: 90 TABLET | Refills: 1 | Status: SHIPPED | OUTPATIENT
Start: 2024-03-26

## 2024-03-27 ENCOUNTER — APPOINTMENT (OUTPATIENT)
Dept: WOMENS IMAGING | Facility: HOSPITAL | Age: 89
End: 2024-03-27
Payer: MEDICARE

## 2024-03-27 PROCEDURE — 77063 BREAST TOMOSYNTHESIS BI: CPT | Performed by: RADIOLOGY

## 2024-03-27 PROCEDURE — 77067 SCR MAMMO BI INCL CAD: CPT | Performed by: RADIOLOGY

## 2024-05-06 ENCOUNTER — HOSPITAL ENCOUNTER (INPATIENT)
Facility: HOSPITAL | Age: 89
LOS: 2 days | Discharge: HOME | DRG: 083 | End: 2024-05-08
Attending: EMERGENCY MEDICINE | Admitting: INTERNAL MEDICINE
Payer: MEDICARE

## 2024-05-06 ENCOUNTER — APPOINTMENT (OUTPATIENT)
Dept: CT IMAGING | Facility: HOSPITAL | Age: 89
DRG: 083 | End: 2024-05-06
Payer: MEDICARE

## 2024-05-06 DIAGNOSIS — I16.1 HYPERTENSIVE EMERGENCY: ICD-10-CM

## 2024-05-06 DIAGNOSIS — S06.5XAA SUBDURAL HEMATOMA: Primary | ICD-10-CM

## 2024-05-06 LAB
ALBUMIN SERPL-MCNC: 4.1 G/DL (ref 3.5–5.2)
ALBUMIN/GLOB SERPL: 1.5 G/DL
ALP SERPL-CCNC: 92 U/L (ref 39–117)
ALT SERPL W P-5'-P-CCNC: 14 U/L (ref 1–33)
ANION GAP SERPL CALCULATED.3IONS-SCNC: 10 MMOL/L (ref 5–15)
ANION GAP SERPL CALCULATED.3IONS-SCNC: 10.7 MMOL/L (ref 5–15)
AST SERPL-CCNC: 24 U/L (ref 1–32)
BASOPHILS # BLD AUTO: 0.03 10*3/MM3 (ref 0–0.2)
BASOPHILS NFR BLD AUTO: 0.7 % (ref 0–1.5)
BILIRUB SERPL-MCNC: 0.3 MG/DL (ref 0–1.2)
BUN SERPL-MCNC: 16 MG/DL (ref 8–23)
BUN SERPL-MCNC: 17 MG/DL (ref 8–23)
BUN/CREAT SERPL: 24.6 (ref 7–25)
BUN/CREAT SERPL: 26.7 (ref 7–25)
CALCIUM SPEC-SCNC: 8.5 MG/DL (ref 8.2–9.6)
CALCIUM SPEC-SCNC: 9 MG/DL (ref 8.2–9.6)
CHLORIDE SERPL-SCNC: 104 MMOL/L (ref 98–107)
CHLORIDE SERPL-SCNC: 98 MMOL/L (ref 98–107)
CO2 SERPL-SCNC: 21 MMOL/L (ref 22–29)
CO2 SERPL-SCNC: 26.3 MMOL/L (ref 22–29)
CREAT SERPL-MCNC: 0.6 MG/DL (ref 0.57–1)
CREAT SERPL-MCNC: 0.69 MG/DL (ref 0.57–1)
DEPRECATED RDW RBC AUTO: 47.5 FL (ref 37–54)
EGFRCR SERPLBLD CKD-EPI 2021: 79.5 ML/MIN/1.73
EGFRCR SERPLBLD CKD-EPI 2021: 82.3 ML/MIN/1.73
EOSINOPHIL # BLD AUTO: 0.06 10*3/MM3 (ref 0–0.4)
EOSINOPHIL NFR BLD AUTO: 1.4 % (ref 0.3–6.2)
ERYTHROCYTE [DISTWIDTH] IN BLOOD BY AUTOMATED COUNT: 13.7 % (ref 12.3–15.4)
GLOBULIN UR ELPH-MCNC: 2.8 GM/DL
GLUCOSE SERPL-MCNC: 105 MG/DL (ref 65–99)
GLUCOSE SERPL-MCNC: 109 MG/DL (ref 65–99)
HCT VFR BLD AUTO: 35.8 % (ref 34–46.6)
HGB BLD-MCNC: 11.4 G/DL (ref 12–15.9)
IMM GRANULOCYTES # BLD AUTO: 0.01 10*3/MM3 (ref 0–0.05)
IMM GRANULOCYTES NFR BLD AUTO: 0.2 % (ref 0–0.5)
INR PPP: 1.06 (ref 0.9–1.1)
LYMPHOCYTES # BLD AUTO: 1.11 10*3/MM3 (ref 0.7–3.1)
LYMPHOCYTES NFR BLD AUTO: 25.2 % (ref 19.6–45.3)
MAGNESIUM SERPL-MCNC: 2.2 MG/DL (ref 1.7–2.3)
MCH RBC QN AUTO: 30.2 PG (ref 26.6–33)
MCHC RBC AUTO-ENTMCNC: 31.8 G/DL (ref 31.5–35.7)
MCV RBC AUTO: 95 FL (ref 79–97)
MONOCYTES # BLD AUTO: 0.44 10*3/MM3 (ref 0.1–0.9)
MONOCYTES NFR BLD AUTO: 10 % (ref 5–12)
NEUTROPHILS NFR BLD AUTO: 2.75 10*3/MM3 (ref 1.7–7)
NEUTROPHILS NFR BLD AUTO: 62.5 % (ref 42.7–76)
NRBC BLD AUTO-RTO: 0 /100 WBC (ref 0–0.2)
PLATELET # BLD AUTO: 205 10*3/MM3 (ref 140–450)
PMV BLD AUTO: 10 FL (ref 6–12)
POTASSIUM SERPL-SCNC: 4.1 MMOL/L (ref 3.5–5.2)
POTASSIUM SERPL-SCNC: 4.1 MMOL/L (ref 3.5–5.2)
PROT SERPL-MCNC: 6.9 G/DL (ref 6–8.5)
PROTHROMBIN TIME: 14 SECONDS (ref 11.7–14.2)
RBC # BLD AUTO: 3.77 10*6/MM3 (ref 3.77–5.28)
SODIUM SERPL-SCNC: 135 MMOL/L (ref 136–145)
SODIUM SERPL-SCNC: 135 MMOL/L (ref 136–145)
WBC NRBC COR # BLD AUTO: 4.4 10*3/MM3 (ref 3.4–10.8)

## 2024-05-06 PROCEDURE — 80053 COMPREHEN METABOLIC PANEL: CPT | Performed by: EMERGENCY MEDICINE

## 2024-05-06 PROCEDURE — 85025 COMPLETE CBC W/AUTO DIFF WBC: CPT | Performed by: EMERGENCY MEDICINE

## 2024-05-06 PROCEDURE — 85610 PROTHROMBIN TIME: CPT | Performed by: EMERGENCY MEDICINE

## 2024-05-06 PROCEDURE — 25810000003 SODIUM CHLORIDE 0.9 % SOLUTION: Performed by: EMERGENCY MEDICINE

## 2024-05-06 PROCEDURE — 99291 CRITICAL CARE FIRST HOUR: CPT

## 2024-05-06 PROCEDURE — 83735 ASSAY OF MAGNESIUM: CPT | Performed by: EMERGENCY MEDICINE

## 2024-05-06 PROCEDURE — 25810000003 SODIUM CHLORIDE 0.9 % SOLUTION: Performed by: INTERNAL MEDICINE

## 2024-05-06 PROCEDURE — 25010000002 NICARDIPINE 2.5 MG/ML SOLUTION: Performed by: EMERGENCY MEDICINE

## 2024-05-06 PROCEDURE — 70450 CT HEAD/BRAIN W/O DYE: CPT

## 2024-05-06 PROCEDURE — 70486 CT MAXILLOFACIAL W/O DYE: CPT

## 2024-05-06 PROCEDURE — 72125 CT NECK SPINE W/O DYE: CPT

## 2024-05-06 RX ORDER — SODIUM CHLORIDE 9 MG/ML
75 INJECTION, SOLUTION INTRAVENOUS CONTINUOUS
Status: DISCONTINUED | OUTPATIENT
Start: 2024-05-06 | End: 2024-05-08

## 2024-05-06 RX ORDER — NITROGLYCERIN 0.4 MG/1
0.4 TABLET SUBLINGUAL
Status: DISCONTINUED | OUTPATIENT
Start: 2024-05-06 | End: 2024-05-08 | Stop reason: HOSPADM

## 2024-05-06 RX ORDER — SODIUM CHLORIDE 0.9 % (FLUSH) 0.9 %
10 SYRINGE (ML) INJECTION AS NEEDED
Status: DISCONTINUED | OUTPATIENT
Start: 2024-05-06 | End: 2024-05-08 | Stop reason: HOSPADM

## 2024-05-06 RX ORDER — BISACODYL 5 MG/1
5 TABLET, DELAYED RELEASE ORAL DAILY PRN
Status: DISCONTINUED | OUTPATIENT
Start: 2024-05-06 | End: 2024-05-08 | Stop reason: HOSPADM

## 2024-05-06 RX ORDER — SODIUM CHLORIDE 0.9 % (FLUSH) 0.9 %
10 SYRINGE (ML) INJECTION EVERY 12 HOURS SCHEDULED
Status: DISCONTINUED | OUTPATIENT
Start: 2024-05-06 | End: 2024-05-08 | Stop reason: HOSPADM

## 2024-05-06 RX ORDER — SODIUM CHLORIDE 9 MG/ML
40 INJECTION, SOLUTION INTRAVENOUS AS NEEDED
Status: DISCONTINUED | OUTPATIENT
Start: 2024-05-06 | End: 2024-05-08 | Stop reason: HOSPADM

## 2024-05-06 RX ORDER — BISACODYL 10 MG
10 SUPPOSITORY, RECTAL RECTAL DAILY PRN
Status: DISCONTINUED | OUTPATIENT
Start: 2024-05-06 | End: 2024-05-08 | Stop reason: HOSPADM

## 2024-05-06 RX ORDER — ACETAMINOPHEN 500 MG
1000 TABLET ORAL ONCE
Status: COMPLETED | OUTPATIENT
Start: 2024-05-06 | End: 2024-05-06

## 2024-05-06 RX ORDER — POLYETHYLENE GLYCOL 3350 17 G/17G
17 POWDER, FOR SOLUTION ORAL DAILY PRN
Status: DISCONTINUED | OUTPATIENT
Start: 2024-05-06 | End: 2024-05-08 | Stop reason: HOSPADM

## 2024-05-06 RX ORDER — AMOXICILLIN 250 MG
2 CAPSULE ORAL 2 TIMES DAILY
Status: DISCONTINUED | OUTPATIENT
Start: 2024-05-06 | End: 2024-05-08 | Stop reason: HOSPADM

## 2024-05-06 RX ADMIN — ACETAMINOPHEN 1000 MG: 500 TABLET ORAL at 20:49

## 2024-05-06 RX ADMIN — SODIUM CHLORIDE 75 ML/HR: 9 INJECTION, SOLUTION INTRAVENOUS at 21:02

## 2024-05-06 RX ADMIN — NICARDIPINE HYDROCHLORIDE 5 MG/HR: 25 INJECTION, SOLUTION INTRAVENOUS at 17:56

## 2024-05-06 RX ADMIN — ACETAMINOPHEN 1000 MG: 500 TABLET ORAL at 17:21

## 2024-05-06 RX ADMIN — SENNOSIDES AND DOCUSATE SODIUM 2 TABLET: 50; 8.6 TABLET ORAL at 20:48

## 2024-05-06 NOTE — ED PROVIDER NOTES
EMERGENCY DEPARTMENT ENCOUNTER    Room Number:  33/33  Date of encounter:  5/6/2024  PCP: Arelis Lara MD  Historian: Patient and daughter  Relevant information and history provided by sources other than the patient will be included below and in the ED Course.  Review of pertinent past medical records may also be included in record below and ED Course.    HPI:  Chief Complaint: Fall on treadmill and injury to face  A complete HPI/ROS/PMH/PSH/SH/FH are unobtainable due to: Not applicable  Context: Cristela Peralta is a 89 y.o. female who presents to the ED c/o this patient was on a treadmill prior to [facility] the [facility].  She thought she hit the stop button but she thinks she inadvertently hit something to increase the speed.  The treadmill belt increased and could not keep up with it she then fell forward.  She hit her face mainly the right side against the treadmill track there was no loss of consciousness.  She also scraped up her knees.  Complains of some pain in her right cheek and periorbital region with significant swelling located there.  Denies any loss of consciousness.  Denies any new visual change.  She is status post in the distant past left corneal transplant.  She chronically does have vision impairment.  Denies any new speech change or any new weakness to arms or legs.  She does take an aspirin and Celebrex.  She is not on any other blood thinning medicines.  She does have hypertension and has been compliant with her hypertensive medicine.  Last time her blood pressure was checked was about 3 weeks ago.  Normally she runs about 150 systolic.  She is able to move all extremities with no obvious pain or deformity.  No chest pain, shortness of breath or abdominal pain.        Previous Episodes: No  Current Symptoms: See above    MEDICAL HISTORY REVIEWED  History of hypertension, hyperlipidemia history of chronic low back pain, peripheral vascular disease I did review her medicine  list.      PAST MEDICAL HISTORY  Active Ambulatory Problems     Diagnosis Date Noted    Diastolic dysfunction 05/13/2016    Hyperlipidemia 05/13/2016    Hypertension 05/13/2016    Chronic low back pain 05/13/2016    Osteoarthritis 05/13/2016    Other osteoporosis without current pathological fracture 05/13/2016    Vitamin D deficiency 10/14/2016    Chronic depression 10/24/2018    PVD (peripheral vascular disease) 06/24/2019    Stenosis of right carotid artery 12/16/2023    Acute lower GI bleeding 01/10/2024    B12 deficiency 01/24/2024     Resolved Ambulatory Problems     Diagnosis Date Noted    Depression 05/13/2016    Gastrointestinal hemorrhage with melena 12/30/2019    Acute posthemorrhagic anemia 01/01/2020    Lower GI bleed 12/30/2019     Past Medical History:   Diagnosis Date    Anxiety     Cataract     Clotting disorder     Degeneration of cervical intervertebral disc     Diverticulosis     GI bleed     H/O bone density study 12/11/2015    History of depression     History of diastolic dysfunction     History of diverticulosis     History of EKG 04/29/2015    History of herpes genitalis     History of mammogram 12/11/2015    History of medical problems     History of spinal stenosis     History of vertigo     HL (hearing loss)     Osteopenia     Osteoporosis     Visual impairment          PAST SURGICAL HISTORY  Past Surgical History:   Procedure Laterality Date    APPENDECTOMY      COLONOSCOPY N/A 01/02/2020    Procedure: COLONOSCOPY;  Surgeon: Tung Muir MD;  Location: [facility] ENDOSCOPY;  Service: Gastroenterology    ENDOSCOPY N/A 01/01/2020    Procedure: ESOPHAGOGASTRODUODENOSCOPY;  Surgeon: Yanet Obregon MD;  Location: [facility] ENDOSCOPY;  Service: Gastroenterology    EYE SURGERY      Desima replacement    HYSTERECTOMY      TOTAL    JOINT REPLACEMENT      KNEE SURGERY      Replacement    TOTAL ABDOMINAL HYSTERECTOMY WITH SALPINGO OOPHORECTOMY           FAMILY HISTORY  Family History    Problem Relation Age of Onset    Aneurysm Father     Heart disease Father         Multiple MI    Arthritis Father     Colon cancer Sister     Throat cancer Sister     Anxiety disorder Sister     Arthritis Sister         RA and osteoarthritis    Anxiety disorder Sister     Arthritis Sister          SOCIAL HISTORY  Social History     Socioeconomic History    Marital status:    Tobacco Use    Smoking status: Never     Passive exposure: Past    Smokeless tobacco: Never   Vaping Use    Vaping status: Never Used   Substance and Sexual Activity    Alcohol use: No    Drug use: Never    Sexual activity: Not Currently     Partners: Male     Birth control/protection: Post-menopausal         ALLERGIES  Neomycin-bacitracin zn-polymyx, Atorvastatin, Pravastatin, Alendronate, and Sulfa antibiotics        REVIEW OF SYSTEMS  Review of Systems     All systems reviewed and negative except for those discussed in HPI.       PHYSICAL EXAM    I have reviewed the triage vital signs and nursing notes.    ED Triage Vitals [05/06/24 1607]   Temp Heart Rate Resp BP SpO2   97.9 °F (36.6 °C) 98 18 (!) 224/120 98 %      Temp src Heart Rate Source Patient Position BP Location FiO2 (%)   -- -- -- -- --       GENERAL: Elderly female.  No acute cardiovascular, respiratory, or neurologic distress.Vital signs on my initial evaluation initial blood pressure is extremely elevated 220s over 120s.  Will get a go ahead and continue to trend blood pressure.  Heart rate is normal normal oxygen saturation and no respiratory distress with normal rate  HENT: nares patent  Patient has a significant amount of swelling to her right maxillary cheek area as well as to her right periorbital area with ecchymosis and bruising.  There is no active bleeding there is no laceration.  I do not feel any obvious crepitance.  EYES: Patient has a subconjunctival hemorrhage to the right lateral aspect in her right eye.  Pupil is reactive to light.  Patient's extraocular  muscles are intact.  There is no diplopia and no obvious extraocular muscle entrapment on exam.  She does have again very fair amount of periorbital swelling and bruising.  There is no obvious malocclusion of the jaw.  lNECK: Supple, no meningismus.  Normal inspection no obvious tenderness on palpation.  CV: regular rhythm, regular rate with intact distal pulses.  RESPIRATORY: normal effort and no respiratory distress.  Clear to auscultation.  Normal inspection and no pain on palpation  ABDOMEN: soft and nontender.  No pain on diffuse palpation  MUSCULOSKELETAL: Patient has mild superficial abrasion to knees bilaterally.  She is got good range of motion to all extremities with no pain in passive or active range of motion.  No pain with external or internal rotation of hips bilaterally.  There is no active bleeding.  NEURO: alert and appropriate, moves all extremities, follows commands.  No focal motor or sensory changes  SKIN: warm, dry    Vital signs and nursing notes reviewed.        LAB RESULTS  Recent Results (from the past 24 hour(s))   Comprehensive Metabolic Panel    Collection Time: 05/06/24  5:50 PM    Specimen: Blood   Result Value Ref Range    Glucose 105 (H) 65 - 99 mg/dL    BUN 17 8 - 23 mg/dL    Creatinine 0.69 0.57 - 1.00 mg/dL    Sodium 135 (L) 136 - 145 mmol/L    Potassium 4.1 3.5 - 5.2 mmol/L    Chloride 98 98 - 107 mmol/L    CO2 26.3 22.0 - 29.0 mmol/L    Calcium 9.0 8.2 - 9.6 mg/dL    Total Protein 6.9 6.0 - 8.5 g/dL    Albumin 4.1 3.5 - 5.2 g/dL    ALT (SGPT) 14 1 - 33 U/L    AST (SGOT) 24 1 - 32 U/L    Alkaline Phosphatase 92 39 - 117 U/L    Total Bilirubin 0.3 0.0 - 1.2 mg/dL    Globulin 2.8 gm/dL    A/G Ratio 1.5 g/dL    BUN/Creatinine Ratio 24.6 7.0 - 25.0    Anion Gap 10.7 5.0 - 15.0 mmol/L    eGFR 79.5 >60.0 mL/min/1.73   Magnesium    Collection Time: 05/06/24  5:50 PM    Specimen: Blood   Result Value Ref Range    Magnesium 2.2 1.7 - 2.3 mg/dL   CBC Auto Differential    Collection Time:  05/06/24  5:50 PM    Specimen: Blood   Result Value Ref Range    WBC 4.40 3.40 - 10.80 10*3/mm3    RBC 3.77 3.77 - 5.28 10*6/mm3    Hemoglobin 11.4 (L) 12.0 - 15.9 g/dL    Hematocrit 35.8 34.0 - 46.6 %    MCV 95.0 79.0 - 97.0 fL    MCH 30.2 26.6 - 33.0 pg    MCHC 31.8 31.5 - 35.7 g/dL    RDW 13.7 12.3 - 15.4 %    RDW-SD 47.5 37.0 - 54.0 fl    MPV 10.0 6.0 - 12.0 fL    Platelets 205 140 - 450 10*3/mm3    Neutrophil % 62.5 42.7 - 76.0 %    Lymphocyte % 25.2 19.6 - 45.3 %    Monocyte % 10.0 5.0 - 12.0 %    Eosinophil % 1.4 0.3 - 6.2 %    Basophil % 0.7 0.0 - 1.5 %    Immature Grans % 0.2 0.0 - 0.5 %    Neutrophils, Absolute 2.75 1.70 - 7.00 10*3/mm3    Lymphocytes, Absolute 1.11 0.70 - 3.10 10*3/mm3    Monocytes, Absolute 0.44 0.10 - 0.90 10*3/mm3    Eosinophils, Absolute 0.06 0.00 - 0.40 10*3/mm3    Basophils, Absolute 0.03 0.00 - 0.20 10*3/mm3    Immature Grans, Absolute 0.01 0.00 - 0.05 10*3/mm3    nRBC 0.0 0.0 - 0.2 /100 WBC   Protime-INR    Collection Time: 05/06/24  7:35 PM    Specimen: Arm, Left; Blood   Result Value Ref Range    Protime 14.0 11.7 - 14.2 Seconds    INR 1.06 0.90 - 1.10       Ordered the above labs and independently reviewed the results.        RADIOLOGY  CT Facial Bones Without Contrast, CT Cervical Spine Without Contrast    Result Date: 5/6/2024  CT FACIAL BONES, CERVICAL SPINE AND CT HEAD WITHOUT CONTRAST  HISTORY: Fall, facial pain, neck pain.  COMPARISON: CT angiogram neck and head 12/08/2023.  CT FACIAL BONES WITHOUT CONTRAST: The frontal, ethmoid, sphenoid and maxillary sinuses are clear. A large premaxillary and periorbital hematoma is appreciated on the right. There is no evidence of fracture.  CT EXAMINATION OF THE CERVICAL SPINE WITHOUT CONTRAST:  There is fusion of the facets to the right at C2-3. There is moderate to severe loss of disc height at C3-4, C4-5, C5-6 and C6-7. There is grade 1 retrolisthesis C3 upon C4 estimated to be approximately 3 mm. There is no evidence of fracture.   C2-3: There is no evidence of disc bulge or herniation.  C3-4: A central disc osteophyte complex is present with no evidence of herniation. Mild foraminal stenosis is present on the right secondary to loss of disc height, retrolisthesis of C3 upon C4 and extension of the disc osteophyte complex into the neural foramen.  C4-5: A central disc osteophyte complex is present resulting in mild flattening of the ventral surface of the thecal sac.  C5-6: A small central disc osteophyte complex is present with no evidence of herniation.  C6-7: Small central disc osteophyte complex is present with no evidence of herniation.  C7-T1: There is mild to moderate foraminal stenosis on the right secondary to uncovertebral degenerative disease.      Multilevel degenerative disease involving the cervical spine is noted as described above. There is no evidence of fracture. See above.   The above information was called to and discussed with Dr. Chou.    Radiation dose reduction techniques were utilized, including automated exposure control and exposure modulation based on body size.   This report was finalized on 5/6/2024 8:07 PM by Dr. Rojelio Campbell M.D on Workstation: [id number]      CT Head Without Contrast    Result Date: 5/6/2024  CT HEAD WO CONTRAST-  INDICATION: Fall, right-sided head injury and pain.  COMPARISON: CT head December 8, 2023  TECHNIQUE: Routine CT head without IV contrast. Coronal and sagittal reformats. Radiation dose reduction techniques were utilized, including automated exposure control and exposure modulation based on body size.  FINDINGS:  Brain: No intraparenchymal hemorrhage. Chronic appearing lacunar type infarction seen in the right basal ganglia. Chronic small vessel ischemic changes. Preserved gray-white differentiation.  Ventricles: No intraventricular hemorrhage. No hydrocephalus.  Extra-axial spaces: Suspicious for small subdural hematoma along the right lateral convexity, series 2, axial mage  28 and series 5, coronal image 28, measuring 2 to 3 mm. Scalp: Right periorbital hematoma, extends into the right face.  Orbits: Cataract extractions. Symmetric globes.  Osseous structures: No fracture.  Sinuses: Patent mastoid air cells and middle ears. Mucosal thickening in the right maxillary sinus and bilateral ethmoid air cells.      Suspicious for small right lateral convexity acute subdural hematoma. Short follow-up recommended to reevaluate.  Call Report: Dr. Chou was notified by telephone of the above findings on May 6, 2024 at 5:35 p.m.  This report was finalized on 5/6/2024 5:37 PM by Dr. Rafael Luong M.D on Workstation: [id number]       I ordered the above noted radiological studies. Reviewed by me and discussed with radiologist.  See dictation for official radiology interpretation.      PROCEDURES    Critical Care    Performed by: Brian Chou MD  Authorized by: Brian Chou MD    Critical care provider statement:     Critical care time (minutes): 45.    Critical care time was exclusive of:  Separately billable procedures and treating other patients    Critical care was necessary to treat or prevent imminent or life-threatening deterioration of the following conditions:  CNS failure or compromise (Hypertensive emergency with an acute traumatic subdural)    Critical care was time spent personally by me on the following activities:  Blood draw for specimens, development of treatment plan with patient or surrogate, discussions with consultants, evaluation of patient's response to treatment, examination of patient, obtaining history from patient or surrogate, review of old charts, re-evaluation of patient's condition, pulse oximetry, ordering and review of radiographic studies, ordering and review of laboratory studies and ordering and performing treatments and interventions    I assumed direction of critical care for this patient from another provider in my specialty: no      Care discussed  with: admitting provider          MEDICATIONS GIVEN IN ER    Medications   sodium chloride 0.9 % flush 10 mL (has no administration in time range)   niCARdipine (CARDENE) 25 mg in 250 mL NS infusion kit (5 mg/hr Intravenous New Bag 5/6/24 1756)   nitroglycerin (NITROSTAT) SL tablet 0.4 mg (has no administration in time range)   sodium chloride 0.9 % flush 10 mL (has no administration in time range)   sodium chloride 0.9 % flush 10 mL (has no administration in time range)   sodium chloride 0.9 % infusion 40 mL (has no administration in time range)   sennosides-docusate (PERICOLACE) 8.6-50 MG per tablet 2 tablet (2 tablets Oral Given 5/6/24 2048)     And   polyethylene glycol (MIRALAX) packet 17 g (has no administration in time range)     And   bisacodyl (DULCOLAX) EC tablet 5 mg (has no administration in time range)     And   bisacodyl (DULCOLAX) suppository 10 mg (has no administration in time range)   sodium chloride 0.9 % infusion (75 mL/hr Intravenous New Bag 5/6/24 2102)   acetaminophen (TYLENOL) tablet 1,000 mg (1,000 mg Oral Given 5/6/24 1721)   acetaminophen (TYLENOL) tablet 1,000 mg (1,000 mg Oral Given 5/6/24 2049)         All labs have been independently reviewed by me.  All radiology studies have been reviewed by me and I discussed with radiologist dictating the report when indicated below.  All EKG's independently viewed and interpreted by me.  Discussion below represents my analysis of pertinent findings related to patient's condition, differential diagnosis, treatment plan and final disposition.        PROGRESS, DATA ANALYSIS, CONSULTS, AND MEDICAL DECISION MAKING    This patient had a mechanical fall with significant fusion to the right periorbital and right maxillary region.  Will get a CT her face, head and neck.  She has no visual impairment and no obvious signs of extraocular muscle entrapment on my exam.  She is able to move all extremities without any pain or deformity.  I do not think she needs  any extremities x-rayed at this time.  Her blood pressure is extremely elevated.  It very well could be due to the recent stress from the fall.  Blood pressure normally runs about 150.  Will continue to monitor it closely.  Right now her pain is well-controlled she does agree to some Tylenol.  She did take a Celebrex earlier today for her chronic osteoarthritis.  Talked with the patient as well as daughter at length.  All questions answered at this time.      ED Course as of 05/06/24 2211   Mon May 06, 2024   1743 I did discuss the case with the radiologist Dr. Kash Luong.  He is concerned that there is an area of small subdural hematoma in the right cerebral hemisphere that is about 2 to 3 mm.  There is no obvious fracture.  Please see his complete dictated report concerning the CT scan of the head [MM]   1743 I also did communicate with Dr. Campbell who is on for radiology.  He looked at the CT scan of the facial bones as well as CT scan of the cervical spine.  There is a large hematoma to the right periorbital region and right maxillary region but no obvious fracture.  No cervical spine fracture or dislocation. [MM]   1744   He is not can to formally dictate the CT scan of the head but he did mention that he was concerned again that there is a small subdural hematoma that is about 2 and half centimeters in the right cerebral hemisphere. [MM]   1749 I did talk with the patient and family and reassessed her.  Blood pressure has stayed persistently high.  Informed them of the results of the CT scans and my conversations with the radiologist.  We need to put her on some IV Cardene to bring her blood pressure down.  Patient will need to be admitted to the hospital.  She has developed some mild soreness in her neck from the fall.  Denies really any other pain.  No chest pain abdominal pain.  Maybe a mild headache.  I given her a Tylenol does not want anything more for pain.  Informed the patient and family of the  seriousness of the condition.  One of her daughters is a nurse.  All questions answered at this time. [MM]   1801 I did discuss the case with the neurosurgeon Dr. Vu.  He looked at the CT scan.  Agrees with Cardene.  Would like repeat CT scan in 6 hours and agree with ICU admission.  All questions answered. [MM]   1842 I did discuss the case with Dr. Case who is on for the intensive care unit.  Informed him of the patient's presenting symptoms and results of the CT scan and my conversation with the neurosurgeon.  Agrees to admit the patient to the ICU [MM]   2013 Blood pressure has responded well with the Cardene. [MM]      ED Course User Index  [MM] Brian Chou MD       AS OF 22:11 EDT VITALS:    BP - 101/57  HR - 87  TEMP - 97.9 °F (36.6 °C)  02 SATS - 92%    SOCIAL DETERMINANTS OF HEALTH THAT IMPACT OR LIMIT CARE (For example..Homelessness,safe discharge, inability to obtain care, follow up, or prescriptions):      DIAGNOSIS  Final diagnoses:   Subdural hematoma   Hypertensive emergency         DISPOSITION  Patient will be admitted to the intensive care unit.          DICTATED UTILIZING DRAGON DICTATION    Note Disclaimer: At [facility], we believe that sharing information builds trust and better relationships. You are receiving this note because you recently visited [facility]. It is possible you will see health information before a provider has talked with you about it. This kind of information can be easy to misunderstand. To help you fully understand what it means for your health, we urge you to discuss this note with your provider.       Brian Chou MD  05/06/24 3005

## 2024-05-06 NOTE — PROGRESS NOTES
Clinical Pharmacy Services: Medication History    Cristela Peralta is a 89 y.o. female presenting to [facility] for   Chief Complaint   Patient presents with    Fall    Facial Injury    Hypertension       She  has a past medical history of Anxiety, Cataract, Clotting disorder, Degeneration of cervical intervertebral disc, Depression, Diverticulosis, GI bleed, H/O bone density study (12/11/2015), History of depression, History of diastolic dysfunction, History of diverticulosis, History of EKG (04/29/2015), History of herpes genitalis, History of mammogram (12/11/2015), History of medical problems, History of spinal stenosis, History of vertigo, HL (hearing loss), Hyperlipidemia, Hypertension, Osteoarthritis, Osteopenia, Osteoporosis, and Visual impairment.    Allergies as of 05/06/2024 - Reviewed 05/06/2024   Allergen Reaction Noted    Neomycin-bacitracin zn-polymyx Rash 05/13/2016    Atorvastatin Other (See Comments) 05/13/2016    Pravastatin Other (See Comments) 05/13/2016    Alendronate GI Intolerance 05/13/2016    Sulfa antibiotics Hives and Rash 05/13/2016       Medication information was obtained from: [employer]   Pharmacy and Phone Number:     Prior to Admission Medications       Prescriptions Last Dose Informant Patient Reported? Taking?    aspirin 81 MG EC tablet  Other No Yes    Take 1 tablet by mouth Daily.    celecoxib (CeleBREX) 200 MG capsule  Pharmacy No Yes    Take 1 capsule by mouth Daily As Needed for Moderate Pain.    citalopram (CeleXA) 10 MG tablet  Pharmacy No Yes    Take 1 tablet by mouth Daily.    Coenzyme Q10 (CoQ-10) 100 MG capsule  Other Yes Yes    Take 1 capsule by mouth Daily.    cyanocobalamin 1000 MCG/ML injection  Pharmacy No Yes    Inject 1 mL under the skin into the appropriate area as directed Every 28 (Twenty-Eight) Days.    hydroCHLOROthiazide 12.5 MG tablet  Pharmacy No Yes    Take 1 tablet by mouth Daily.    pantoprazole (PROTONIX) 40 MG EC tablet  Pharmacy No  "Yes    Take 1 tablet by mouth 2 (Two) Times a Day.    rosuvastatin (Crestor) 20 MG tablet  Pharmacy No Yes    Take 1 tablet by mouth Daily.    acetaminophen (TYLENOL) 500 MG tablet  Other Yes No    Take 1 tablet by mouth Every 6 (Six) Hours As Needed for Mild Pain.    Insulin Syringe/Needle (SURE COMFORT INS SYR .5CC/28G) 28G X 1/2\" 0.5 ML misc   No No    USE ONCE MONTHLY FOR SUB-Q B-12 INJECTIONS    prednisoLONE acetate (PRED FORTE) 1 % ophthalmic suspension   Yes No    1 drop 4 (Four) Times a Day.    Syringe/Needle, Disp, (BD SafetyGlide Syringe/Needle) 27G X 5/8\" 1 ML misc   No No    Use 1 each Every 30 (Thirty) Days.              Medication notes:     This medication list is complete to the best of my knowledge as of 5/6/2024    Please call if questions.    Roverto Albright  Medication History Technician   [phone]    5/6/2024 19:12 EDT      "

## 2024-05-06 NOTE — H&P
H&P NOTE    Patient Identification:  Cristela Peralta  89 y.o.  female  4/14/1928  [id number]                CC: Fall on the treadmill with injury to face    History of Present Illness:  89-year-old female with history of anxiety depression presented to the emergency room status post fall on the treadmill.  Apparently she was trying to stop the treadmill but inadvertently hit something to increase the speed of the treadmill.  She fell forward hitting her face mainly on the right side against the treadmill track.  No loss of consciousness was reported.  She complained of pain on her right cheek and periorbital region with significant swelling located there.  No visual change was reported.  History of left corneal transplant.  Not on any blood thinners other than baby aspirin at this time.  Currently in the ER complains of pain on the right side of her face where she has significant bruising.  No chest pain no nausea vomiting or headache as such reported.  Further workup in the emergency room with CT head revealed Suspicious for small right lateral convexity acute subdural hematoma.  We were asked to admit to the ICU.      Review of Systems  As above rest is negative  Past Medical History:  Past Medical History:   Diagnosis Date    Anxiety     After death of spouse    Cataract     Clotting disorder     2020 and 2024    Degeneration of cervical intervertebral disc     Depression     Diverticulosis     GI bleed     H/O bone density study 12/11/2015    History of depression     History of diastolic dysfunction     History of diverticulosis     History of EKG 04/29/2015    History of herpes genitalis     History of mammogram 12/11/2015    History of medical problems     PVD, talia    History of spinal stenosis     History of vertigo     HL (hearing loss)     Hyperlipidemia     Hypertension     Osteoarthritis     Osteopenia     Osteoporosis     Visual impairment        Past Surgical History:  Past Surgical History:  "  Procedure Laterality Date    APPENDECTOMY      COLONOSCOPY N/A 01/02/2020    Procedure: COLONOSCOPY;  Surgeon: Tung Muir MD;  Location:  [facility];  Service: Gastroenterology    ENDOSCOPY N/A 01/01/2020    Procedure: ESOPHAGOGASTRODUODENOSCOPY;  Surgeon: Yanet Obregon MD;  Location:  [facility];  Service: Gastroenterology    EYE SURGERY      Desima replacement    HYSTERECTOMY      TOTAL    JOINT REPLACEMENT      KNEE SURGERY      Replacement    TOTAL ABDOMINAL HYSTERECTOMY WITH SALPINGO OOPHORECTOMY          Home Meds:  (Not in a hospital admission)      Allergies:  Allergies   Allergen Reactions    Neomycin-Bacitracin Zn-Polymyx Rash    Atorvastatin Other (See Comments)     LEG CRAMPS    Pravastatin Other (See Comments)     LEG CRAMPS    Alendronate GI Intolerance    Sulfa Antibiotics Hives and Rash       Social History:   Social History     Socioeconomic History    Marital status:    Tobacco Use    Smoking status: Never     Passive exposure: Past    Smokeless tobacco: Never   Vaping Use    Vaping status: Never Used   Substance and Sexual Activity    Alcohol use: No    Drug use: Never    Sexual activity: Not Currently     Partners: Male     Birth control/protection: Post-menopausal       Family History:  Family History   Problem Relation Age of Onset    Aneurysm Father     Heart disease Father         Multiple MI    Arthritis Father     Colon cancer Sister     Throat cancer Sister     Anxiety disorder Sister     Arthritis Sister         RA and osteoarthritis    Anxiety disorder Sister     Arthritis Sister        Physical Exam:  /73 (BP Location: Left arm, Patient Position: Lying)   Pulse 96   Temp 97.9 °F (36.6 °C)   Resp 18   Ht 167.6 cm (66\")   Wt 62.6 kg (138 lb 0.1 oz)   LMP  (LMP Unknown) Comment: TOTAL  SpO2 95%   BMI 22.28 kg/m²  Body mass index is 22.28 kg/m². 95% 62.6 kg (138 lb 0.1 oz)  Physical Exam  Patient is examined using the personal protective " "equipment as per guidelines from infection control for this particular patient as enacted.  Hand hygiene was performed before and after patient interaction.  Well-developed normal body habitus  Eyes normal conjunctivae and pupils reactive to light  HEENT significant amount of swelling to the right maxillary cheek area and right periorbital ecchymosis bruising no active bleeding.  No deformities noted.  Neck midline trachea no thyromegaly  Chest no labored breathing clear  CVS regular rate and rhythm no lower extremity edema  Abdomen soft nontender no hepatosplenomegaly  CNS intact normal sensory exam  Skin no rashes no nodules  Psych oriented to time place and person normal memory  Musculoskeletal no cyanosis no clubbing normal range of motion        LABS:  Lab Results   Component Value Date    CALCIUM 9.0 05/06/2024    PHOS 3.2 04/28/2023     Results from last 7 days   Lab Units 05/06/24  1750   MAGNESIUM mg/dL 2.2   SODIUM mmol/L 135*   POTASSIUM mmol/L 4.1   CHLORIDE mmol/L 98   CO2 mmol/L 26.3   BUN mg/dL 17   CREATININE mg/dL 0.69   GLUCOSE mg/dL 105*   CALCIUM mg/dL 9.0   WBC 10*3/mm3 4.40   HEMOGLOBIN g/dL 11.4*   PLATELETS 10*3/mm3 205   ALT (SGPT) U/L 14   AST (SGOT) U/L 24     No results found for: \"CKTOTAL\", \"CKMB\", \"CKMBINDEX\", \"TROPONINI\", \"TROPONINT\"                              Lab Results   Component Value Date    TSH 5.500 (H) 12/11/2023     Estimated Creatinine Clearance: 47.1 mL/min (by C-G formula based on SCr of 0.69 mg/dL).         Imaging: I personally visualized the images of scans/x-rays performed within last 3 days.      Assessment:  Subdural hematoma  Right sided facial bruising  Hypertensive urgency on Cardene drip  Hyponatremia      Recommendations:  Patient be admitted to the ICU for neurochecks per protocol  Neurosurgery has been consulted in the emergency room  Neurochecks every hour per protocol  Pain control with as needed Tylenol at this time  Normal saline at 75 cc an hour  Blood " pressure management keep systolic less than 150.  Currently on Cardene drip  Repeat CT head in the morning  ICU core measures          Michelle Burger MD  5/6/2024  19:26 EDT      Much of this encounter note is an electronic transcription/translation of spoken language to printed text using Dragon Software.

## 2024-05-06 NOTE — ED TRIAGE NOTES
[facility], was walking on a treadmill when it sped up causing her to fall, hematoma to rt cheek/eye/jaw, abrasion to both knees

## 2024-05-07 ENCOUNTER — TELEPHONE (OUTPATIENT)
Dept: NEUROSURGERY | Facility: CLINIC | Age: 89
End: 2024-05-07
Payer: OTHER GOVERNMENT

## 2024-05-07 ENCOUNTER — APPOINTMENT (OUTPATIENT)
Dept: CT IMAGING | Facility: HOSPITAL | Age: 89
DRG: 083 | End: 2024-05-07
Payer: MEDICARE

## 2024-05-07 DIAGNOSIS — S06.5XAA SUBDURAL HEMATOMA: Primary | ICD-10-CM

## 2024-05-07 LAB
ANION GAP SERPL CALCULATED.3IONS-SCNC: 6 MMOL/L (ref 5–15)
BASOPHILS # BLD AUTO: 0.02 10*3/MM3 (ref 0–0.2)
BASOPHILS NFR BLD AUTO: 0.5 % (ref 0–1.5)
BUN SERPL-MCNC: 14 MG/DL (ref 8–23)
BUN/CREAT SERPL: 25 (ref 7–25)
CALCIUM SPEC-SCNC: 8 MG/DL (ref 8.2–9.6)
CHLORIDE SERPL-SCNC: 107 MMOL/L (ref 98–107)
CO2 SERPL-SCNC: 25 MMOL/L (ref 22–29)
CREAT SERPL-MCNC: 0.56 MG/DL (ref 0.57–1)
DEPRECATED RDW RBC AUTO: 45.7 FL (ref 37–54)
EGFRCR SERPLBLD CKD-EPI 2021: 83.6 ML/MIN/1.73
EOSINOPHIL # BLD AUTO: 0.11 10*3/MM3 (ref 0–0.4)
EOSINOPHIL NFR BLD AUTO: 3 % (ref 0.3–6.2)
ERYTHROCYTE [DISTWIDTH] IN BLOOD BY AUTOMATED COUNT: 13.6 % (ref 12.3–15.4)
GLUCOSE SERPL-MCNC: 83 MG/DL (ref 65–99)
HCT VFR BLD AUTO: 32.5 % (ref 34–46.6)
HGB BLD-MCNC: 10.6 G/DL (ref 12–15.9)
IMM GRANULOCYTES # BLD AUTO: 0.01 10*3/MM3 (ref 0–0.05)
IMM GRANULOCYTES NFR BLD AUTO: 0.3 % (ref 0–0.5)
LYMPHOCYTES # BLD AUTO: 1.23 10*3/MM3 (ref 0.7–3.1)
LYMPHOCYTES NFR BLD AUTO: 33.7 % (ref 19.6–45.3)
MCH RBC QN AUTO: 30.5 PG (ref 26.6–33)
MCHC RBC AUTO-ENTMCNC: 32.6 G/DL (ref 31.5–35.7)
MCV RBC AUTO: 93.4 FL (ref 79–97)
MONOCYTES # BLD AUTO: 0.49 10*3/MM3 (ref 0.1–0.9)
MONOCYTES NFR BLD AUTO: 13.4 % (ref 5–12)
NEUTROPHILS NFR BLD AUTO: 1.79 10*3/MM3 (ref 1.7–7)
NEUTROPHILS NFR BLD AUTO: 49.1 % (ref 42.7–76)
NRBC BLD AUTO-RTO: 0 /100 WBC (ref 0–0.2)
PLATELET # BLD AUTO: 210 10*3/MM3 (ref 140–450)
PMV BLD AUTO: 10.8 FL (ref 6–12)
POTASSIUM SERPL-SCNC: 3.6 MMOL/L (ref 3.5–5.2)
RBC # BLD AUTO: 3.48 10*6/MM3 (ref 3.77–5.28)
SODIUM SERPL-SCNC: 138 MMOL/L (ref 136–145)
WBC NRBC COR # BLD AUTO: 3.65 10*3/MM3 (ref 3.4–10.8)

## 2024-05-07 PROCEDURE — 25010000002 NICARDIPINE 2.5 MG/ML SOLUTION: Performed by: EMERGENCY MEDICINE

## 2024-05-07 PROCEDURE — 25010000002 LABETALOL 5 MG/ML SOLUTION: Performed by: INTERNAL MEDICINE

## 2024-05-07 PROCEDURE — 25810000003 SODIUM CHLORIDE 0.9 % SOLUTION: Performed by: EMERGENCY MEDICINE

## 2024-05-07 PROCEDURE — 25810000003 SODIUM CHLORIDE 0.9 % SOLUTION: Performed by: INTERNAL MEDICINE

## 2024-05-07 PROCEDURE — 85025 COMPLETE CBC W/AUTO DIFF WBC: CPT | Performed by: INTERNAL MEDICINE

## 2024-05-07 PROCEDURE — 80048 BASIC METABOLIC PNL TOTAL CA: CPT | Performed by: INTERNAL MEDICINE

## 2024-05-07 PROCEDURE — 70450 CT HEAD/BRAIN W/O DYE: CPT

## 2024-05-07 RX ORDER — ACETAMINOPHEN 325 MG/1
650 TABLET ORAL EVERY 6 HOURS PRN
Status: DISCONTINUED | OUTPATIENT
Start: 2024-05-07 | End: 2024-05-08 | Stop reason: HOSPADM

## 2024-05-07 RX ORDER — LABETALOL HYDROCHLORIDE 5 MG/ML
10 INJECTION, SOLUTION INTRAVENOUS EVERY 6 HOURS PRN
Status: DISCONTINUED | OUTPATIENT
Start: 2024-05-07 | End: 2024-05-08 | Stop reason: HOSPADM

## 2024-05-07 RX ORDER — CITALOPRAM HYDROBROMIDE 10 MG/1
10 TABLET ORAL DAILY
Status: DISCONTINUED | OUTPATIENT
Start: 2024-05-07 | End: 2024-05-08 | Stop reason: HOSPADM

## 2024-05-07 RX ORDER — PREDNISOLONE ACETATE 10 MG/ML
1 SUSPENSION/ DROPS OPHTHALMIC 4 TIMES DAILY
Status: DISCONTINUED | OUTPATIENT
Start: 2024-05-07 | End: 2024-05-08

## 2024-05-07 RX ORDER — ROSUVASTATIN CALCIUM 20 MG/1
20 TABLET, COATED ORAL DAILY
Status: DISCONTINUED | OUTPATIENT
Start: 2024-05-07 | End: 2024-05-08 | Stop reason: HOSPADM

## 2024-05-07 RX ORDER — HYDROCHLOROTHIAZIDE 12.5 MG/1
12.5 TABLET ORAL DAILY
Status: DISCONTINUED | OUTPATIENT
Start: 2024-05-07 | End: 2024-05-08 | Stop reason: HOSPADM

## 2024-05-07 RX ORDER — PANTOPRAZOLE SODIUM 40 MG/1
40 TABLET, DELAYED RELEASE ORAL 2 TIMES DAILY
Status: DISCONTINUED | OUTPATIENT
Start: 2024-05-07 | End: 2024-05-08 | Stop reason: HOSPADM

## 2024-05-07 RX ADMIN — Medication 10 ML: at 05:37

## 2024-05-07 RX ADMIN — NICARDIPINE HYDROCHLORIDE 5 MG/HR: 25 INJECTION, SOLUTION INTRAVENOUS at 05:31

## 2024-05-07 RX ADMIN — CITALOPRAM 10 MG: 20 TABLET, FILM COATED ORAL at 15:56

## 2024-05-07 RX ADMIN — PANTOPRAZOLE SODIUM 40 MG: 40 TABLET, DELAYED RELEASE ORAL at 19:57

## 2024-05-07 RX ADMIN — ACETAMINOPHEN 325MG 650 MG: 325 TABLET ORAL at 09:25

## 2024-05-07 RX ADMIN — ROSUVASTATIN CALCIUM 20 MG: 20 TABLET, FILM COATED ORAL at 16:57

## 2024-05-07 RX ADMIN — ACETAMINOPHEN 325MG 650 MG: 325 TABLET ORAL at 17:26

## 2024-05-07 RX ADMIN — Medication 10 ML: at 09:27

## 2024-05-07 RX ADMIN — SODIUM CHLORIDE 75 ML/HR: 9 INJECTION, SOLUTION INTRAVENOUS at 10:06

## 2024-05-07 RX ADMIN — Medication 10 ML: at 20:00

## 2024-05-07 RX ADMIN — NICARDIPINE HYDROCHLORIDE 5 MG/HR: 25 INJECTION, SOLUTION INTRAVENOUS at 10:04

## 2024-05-07 RX ADMIN — HYDROCHLOROTHIAZIDE 12.5 MG: 12.5 TABLET ORAL at 16:57

## 2024-05-07 RX ADMIN — PREDNISOLONE ACETATE 1 DROP: 10 SUSPENSION/ DROPS OPHTHALMIC at 17:21

## 2024-05-07 RX ADMIN — SENNOSIDES AND DOCUSATE SODIUM 2 TABLET: 50; 8.6 TABLET ORAL at 09:25

## 2024-05-07 RX ADMIN — LABETALOL HYDROCHLORIDE 10 MG: 5 INJECTION, SOLUTION INTRAVENOUS at 21:33

## 2024-05-07 NOTE — ED NOTES
Nursing report ED to floor  Cristela Peralta  89 y.o.  female    HPI :  HPI (Adult)  Stated Reason for Visit: fall  History Obtained From: EMS    Chief Complaint  Chief Complaint   Patient presents with    Fall    Facial Injury    Hypertension       Admitting doctor:   Lizandro Case MD    Admitting diagnosis:   The primary encounter diagnosis was Subdural hematoma. A diagnosis of Hypertensive emergency was also pertinent to this visit.    Code status:   Current Code Status       Date Active Code Status Order ID Comments User Context       5/6/2024 1932 CPR (Attempt to Resuscitate) [id number]  Michelle Burger MD ED        Question Answer    Code Status (Patient has no pulse and is not breathing) CPR (Attempt to Resuscitate)    Medical Interventions (Patient has pulse or is breathing) Full Support                    Allergies:   Neomycin-bacitracin zn-polymyx, Atorvastatin, Pravastatin, Alendronate, and Sulfa antibiotics    Isolation:   No active isolations    Intake and Output  No intake or output data in the 24 hours ending 05/06/24 2058    Weight:       05/06/24  1634   Weight: 62.6 kg (138 lb 0.1 oz)       Most recent vitals:   Vitals:    05/06/24 1841 05/06/24 1846 05/06/24 1856 05/06/24 2031   BP: 143/87 155/71 139/73 101/57   BP Location:   Left arm    Patient Position:   Lying    Pulse: 93 96  87   Resp:       Temp:       SpO2: 95%   92%   Weight:       Height:           Active LDAs/IV Access:   Lines, Drains & Airways       Active LDAs       Name Placement date Placement time Site Days    Peripheral IV 05/06/24 1755 Right Antecubital 05/06/24 1755  Antecubital  less than 1                    Labs (abnormal labs have a star):   Labs Reviewed   COMPREHENSIVE METABOLIC PANEL - Abnormal; Notable for the following components:       Result Value    Glucose 105 (*)     Sodium 135 (*)     All other components within normal limits    Narrative:     GFR Normal >60  Chronic Kidney Disease <60  Kidney Failure <15    The  GFR formula is only valid for adults with stable renal function between ages 18 and 70.   CBC WITH AUTO DIFFERENTIAL - Abnormal; Notable for the following components:    Hemoglobin 11.4 (*)     All other components within normal limits   MAGNESIUM - Normal   PROTIME-INR - Normal   BASIC METABOLIC PANEL   CBC AND DIFFERENTIAL    Narrative:     The following orders were created for panel order CBC & Differential.  Procedure                               Abnormality         Status                     ---------                               -----------         ------                     CBC Auto Differential[[id number]]        Abnormal            Final result                 Please view results for these tests on the individual orders.       EKG:   No orders to display       Meds given in ED:   Medications   sodium chloride 0.9 % flush 10 mL (has no administration in time range)   niCARdipine (CARDENE) 25 mg in 250 mL NS infusion kit (5 mg/hr Intravenous New Bag 5/6/24 1756)   nitroglycerin (NITROSTAT) SL tablet 0.4 mg (has no administration in time range)   sodium chloride 0.9 % flush 10 mL (has no administration in time range)   sodium chloride 0.9 % flush 10 mL (has no administration in time range)   sodium chloride 0.9 % infusion 40 mL (has no administration in time range)   sennosides-docusate (PERICOLACE) 8.6-50 MG per tablet 2 tablet (2 tablets Oral Given 5/6/24 2048)     And   polyethylene glycol (MIRALAX) packet 17 g (has no administration in time range)     And   bisacodyl (DULCOLAX) EC tablet 5 mg (has no administration in time range)     And   bisacodyl (DULCOLAX) suppository 10 mg (has no administration in time range)   sodium chloride 0.9 % infusion (has no administration in time range)   acetaminophen (TYLENOL) tablet 1,000 mg (1,000 mg Oral Given 5/6/24 1721)   acetaminophen (TYLENOL) tablet 1,000 mg (1,000 mg Oral Given 5/6/24 2049)       Imaging results:  CT Facial Bones Without Contrast    Result Date:  5/6/2024  Multilevel degenerative disease involving the cervical spine is noted as described above. There is no evidence of fracture. See above.   The above information was called to and discussed with Dr. Chou.    Radiation dose reduction techniques were utilized, including automated exposure control and exposure modulation based on body size.   This report was finalized on 5/6/2024 8:07 PM by Dr. Rojelio Campbell M.D on Workstation: [id number]      CT Cervical Spine Without Contrast    Result Date: 5/6/2024  Multilevel degenerative disease involving the cervical spine is noted as described above. There is no evidence of fracture. See above.   The above information was called to and discussed with Dr. Chou.    Radiation dose reduction techniques were utilized, including automated exposure control and exposure modulation based on body size.   This report was finalized on 5/6/2024 8:07 PM by Dr. Rojelio Campbell M.D on Workstation: [id number]      CT Head Without Contrast    Result Date: 5/6/2024  Suspicious for small right lateral convexity acute subdural hematoma. Short follow-up recommended to reevaluate.  Call Report: Dr. Chou was notified by telephone of the above findings on May 6, 2024 at 5:35 p.m.  This report was finalized on 5/6/2024 5:37 PM by Dr. Rafael Luong M.D on Workstation: [id number]       Ambulatory status:   - bedrest    Social issues:   Social History     Socioeconomic History    Marital status:    Tobacco Use    Smoking status: Never     Passive exposure: Past    Smokeless tobacco: Never   Vaping Use    Vaping status: Never Used   Substance and Sexual Activity    Alcohol use: No    Drug use: Never    Sexual activity: Not Currently     Partners: Male     Birth control/protection: Post-menopausal       Peripheral Neurovascular  Peripheral Neurovascular (Adult)  Peripheral Neurovascular WDL: WDL    Neuro Cognitive  Neuro Cognitive (Adult)  Cognitive/Neuro/Behavioral WDL:  WDL  [place] Coma Scale  Best Eye Response: 4-->(E4) spontaneous  Best Motor Response: 6-->(M6) obeys commands  Best Verbal Response: 5-->(V5) oriented  [place] Coma Scale Score: 15    Learning  Learning Assessment (Adult)  Learning Readiness and Ability: no barriers identified  Education Provided  Person Taught: patient, family member/friend    Respiratory  Respiratory WDL  Respiratory WDL: WDL    Abdominal Pain       Pain Assessments  Pain (Adult)  (0-10) Pain Rating: Rest: 3  Pain Management Interventions: see MAR  Response to Pain Interventions: interventions effective per patient, nonverbal indicators absent/decreased    NIH Stroke Scale       Shannon Clark RN  05/06/24 20:58 EDT

## 2024-05-07 NOTE — CASE MANAGEMENT/SOCIAL WORK
Discharge Planning Assessment  [facility]     Patient Name: Cristela Peralta  MRN: [id number]  Today's Date: 5/7/2024    Admit Date: 5/6/2024    Plan: Patient plans to return home upon discharge, where she lives alone in private patio home. She is IND with IADL's. Family to transport. No needs anticipated. Encouraged to contact CCP should needs change.   Discharge Needs Assessment       Row Name 05/07/24 1403       Living Environment    People in Home alone    Current Living Arrangements condominium  1 level patio home with 0 SO.    Potentially Unsafe Housing Conditions none    Primary Care Provided by self;child(zeferino)    Provides Primary Care For no one    Family Caregiver if Needed child(zeferino), adult    Family Caregiver Names Adult children.    Quality of Family Relationships helpful;involved;supportive    Able to Return to Prior Arrangements yes    Living Arrangement Comments Patient resides alone in private patio home with 0 SO.       Resource/Environmental Concerns    Resource/Environmental Concerns none    Transportation Concerns none       Transition Planning    Patient/Family Anticipates Transition to home    Patient/Family Anticipated Services at Transition none    Transportation Anticipated family or friend will provide       Discharge Needs Assessment    Readmission Within the Last 30 Days no previous admission in last 30 days    Equipment Currently Used at Home none  Patient states she owns a walker and cane, but does not need to use them at baseline.    Concerns to be Addressed no discharge needs identified;denies needs/concerns at this time    Anticipated Changes Related to Illness none    Equipment Needed After Discharge none    Current Discharge Risk lives alone                   Discharge Plan       Row Name 05/07/24 0205       Plan    Plan Patient plans to return home upon discharge, where she lives alone in private patio home. She is IND with IADL's. Family to transport. No needs anticipated.  Encouraged to contact CCP should needs change.    Patient/Family in Agreement with Plan yes    Plan Comments Spoke with patient and her son at bedside regarding discharge planning. Patient plans to return home upon discharge, where she lives in private 1 level patio home with 0 steps to enter. At baseline, she is IND with IADL's with no use of DME. She reports that she does own a walker and cane, but has no baseline need for them. Her family provides transportation as needed. She denies any recent mechanical falls, other than the fall from treadmill that caused her to present to the hospital yesterday. She reports that she uses [facility] in [place] for her immediate prescription needs, but typically uses [facility], which is available to her through her [employer] Medicare Replacement plan. She denies any issues obtaining/affording medications. She would like us to fill any new prescription from this hospitalization here (Meds to Beds). She denies current HH or therapies. Family plans on providing discharge transportation. No needs anticipated at this time. Encouraged patient to contact CCP should her needs change.                  Continued Care and Services - Admitted Since 5/6/2024    No active coordination exists for this encounter.          Demographic Summary       Row Name 05/07/24 1401       General Information    Admission Type inpatient    Arrived From home    Required Notices Provided Important Message from Medicare    Referral Source admission list    Reason for Consult discharge planning    Preferred Language English    General Information Comments Facesheet verified. Role of CCP explained. Appropriate PPE worn at all times.                   Functional Status       Row Name 05/07/24 1402       Functional Status    Usual Activity Tolerance good    Current Activity Tolerance good       Functional Status, IADL    Medications independent    Meal Preparation independent    Housekeeping  independent    Laundry independent    Shopping independent    IADL Comments IND with IADL's.       Mental Status    General Appearance WDL WDL                   Psychosocial       Row Name 05/07/24 1402       Behavior WDL    Behavior WDL WDL       Emotion Mood WDL    Emotion/Mood/Affect WDL WDL       Speech WDL    Speech WDL WDL       Perceptual State WDL    Perceptual State WDL WDL       Thought Process WDL    Thought Process WDL WDL       Intellectual Performance WDL    Intellectual Performance WDL WDL    Level of Consciousness Alert       Coping/Stress    Patient Personal Strengths expressive of needs;expressive of emotions;self-reliant;strong support system    Sources of Support adult child(zeferino)    Reaction to Health Status accepting;adjusting       Developmental Stage (Eriksson's)    Developmental Stage Stage 8 (65 years-death/Late Adulthood) Integrity vs. Despair                   Abuse/Neglect       Row Name 05/07/24 1403       Personal Safety    Physical Signs of Abuse Present no                   Legal    No documentation.                  Substance Abuse    No documentation.                  Patient Forms    No documentation.                     Rafael Christian, RN

## 2024-05-07 NOTE — CONSULTS
[facility] NEUROSURGERY CONSULT NOTE    Patient name: Cristela Peralta  Referring Provider: Dr. Chou  Reason for Consultation: Subdural hematoma    Patient Care Team:  Arelis Lara MD as PCP - General    Chief complaint: Fall, head and facial injury    Subjective .   History of present illness:    Patient is a 89 y.o. right handed female with past medical history of HTN, hyperlipidemia, chronic back pain, PVD.  Patient reports that yesterday around 3 PM she was on the treadmill, she thought she was hitting the stop button when she actually hit a button that increased the speed.  Patient could not keep up resulting in her falling forward and face planting on the treadmill.  Patient denies LOC.  She denies nausea, vomiting or headache.  She does complain of soreness in her neck and shoulders.  She has significant bruising to the right side of her face/cheek.  She denies extremity injury.  She currently is on aspirin and Celebrex but no other anticoagulating or antiplatelet medication.  She reports that she does get on the treadmill least 3 days a week.  CT head performed in the ER was suspicious for small right lateral acute subdural hematoma.      SOCIAL HISTORY  Social History     Tobacco Use    Smoking status: Never     Passive exposure: Past    Smokeless tobacco: Never   Vaping Use    Vaping status: Never Used   Substance Use Topics    Alcohol use: No    Drug use: Never         Retired    History  PAST MEDICAL HISTORY  Past Medical History:   Diagnosis Date    Anxiety     After death of spouse    Cataract     Clotting disorder     2020 and 2024    Degeneration of cervical intervertebral disc     Depression     Diverticulosis     GI bleed     H/O bone density study 12/11/2015    History of depression     History of diastolic dysfunction     History of diverticulosis     History of EKG 04/29/2015    History of herpes genitalis     History of mammogram 12/11/2015    History of medical problems     PVD,  talia    History of spinal stenosis     History of vertigo     HL (hearing loss)     Hyperlipidemia     Hypertension     Osteoarthritis     Osteopenia     Osteoporosis     Visual impairment        PAST SURGICAL HISTORY  Past Surgical History:   Procedure Laterality Date    APPENDECTOMY      COLONOSCOPY N/A 01/02/2020    Procedure: COLONOSCOPY;  Surgeon: Tung Muir MD;  Location:  [facility];  Service: Gastroenterology    ENDOSCOPY N/A 01/01/2020    Procedure: ESOPHAGOGASTRODUODENOSCOPY;  Surgeon: Yanet Obregon MD;  Location: [facility] END[facility]COPY;  Service: Gastroenterology    EYE SURGERY      Desima replacement    HYSTERECTOMY      TOTAL    JOINT REPLACEMENT      KNEE SURGERY      Replacement    TOTAL ABDOMINAL HYSTERECTOMY WITH SALPINGO OOPHORECTOMY         FAMILY HISTORY  Family History   Problem Relation Age of Onset    Aneurysm Father     Heart disease Father         Multiple MI    Arthritis Father     Colon cancer Sister     Throat cancer Sister     Anxiety disorder Sister     Arthritis Sister         RA and osteoarthritis    Anxiety disorder Sister     Arthritis Sister        Allergies:  Neomycin-bacitracin zn-polymyx, Atorvastatin, Pravastatin, Alendronate, and Sulfa antibiotics    MEDICATIONS:  (Not in a hospital admission)      Current Facility-Administered Medications:     acetaminophen (TYLENOL) tablet 650 mg, 650 mg, Oral, Q6H PRN, Apolinar Flores MD, 650 mg at 05/07/24 0925    sennosides-docusate (PERICOLACE) 8.6-50 MG per tablet 2 tablet, 2 tablet, Oral, BID, 2 tablet at 05/07/24 0925 **AND** polyethylene glycol (MIRALAX) packet 17 g, 17 g, Oral, Daily PRN **AND** bisacodyl (DULCOLAX) EC tablet 5 mg, 5 mg, Oral, Daily PRN **AND** bisacodyl (DULCOLAX) suppository 10 mg, 10 mg, Rectal, Daily PRN, Michelle Burger MD    niCARdipine (CARDENE) 25 mg in 250 mL NS infusion kit, 5-15 mg/hr, Intravenous, Titrated, Brian Chou MD, Stopped at 05/07/24 1104    nitroglycerin (NITROSTAT) SL  "tablet 0.4 mg, 0.4 mg, Sublingual, Q5 Min PRN, Michelle Burger MD    [COMPLETED] Insert Peripheral IV, , , Once **AND** sodium chloride 0.9 % flush 10 mL, 10 mL, Intravenous, PRN, Brian Chou MD    sodium chloride 0.9 % flush 10 mL, 10 mL, Intravenous, Q12H, Michelle Burger MD, 10 mL at 05/07/24 0927    sodium chloride 0.9 % flush 10 mL, 10 mL, Intravenous, PRN, Michelle Burger MD    sodium chloride 0.9 % infusion 40 mL, 40 mL, Intravenous, PRN, Michelle Burger MD    sodium chloride 0.9 % infusion, 75 mL/hr, Intravenous, Continuous, Michelle Burger MD, Last Rate: 75 mL/hr at 05/07/24 1006, 75 mL/hr at 05/07/24 1006    Current Outpatient Medications:     aspirin 81 MG EC tablet, Take 1 tablet by mouth Daily., Disp: 90 tablet, Rfl: 3    celecoxib (CeleBREX) 200 MG capsule, Take 1 capsule by mouth Daily As Needed for Moderate Pain., Disp: 90 capsule, Rfl: 1    citalopram (CeleXA) 10 MG tablet, Take 1 tablet by mouth Daily., Disp: 90 tablet, Rfl: 1    Coenzyme Q10 (CoQ-10) 100 MG capsule, Take 1 capsule by mouth Daily., Disp: , Rfl:     cyanocobalamin 1000 MCG/ML injection, Inject 1 mL under the skin into the appropriate area as directed Every 28 (Twenty-Eight) Days., Disp: 10 mL, Rfl: 11    hydroCHLOROthiazide 12.5 MG tablet, Take 1 tablet by mouth Daily., Disp: 90 tablet, Rfl: 1    pantoprazole (PROTONIX) 40 MG EC tablet, Take 1 tablet by mouth 2 (Two) Times a Day., Disp: 60 tablet, Rfl: 5    rosuvastatin (Crestor) 20 MG tablet, Take 1 tablet by mouth Daily., Disp: 90 tablet, Rfl: 1    acetaminophen (TYLENOL) 500 MG tablet, Take 1 tablet by mouth Every 6 (Six) Hours As Needed for Mild Pain., Disp: , Rfl:     Insulin Syringe/Needle (SURE COMFORT INS SYR .5CC/28G) 28G X 1/2\" 0.5 ML misc, USE ONCE MONTHLY FOR SUB-Q B-12 INJECTIONS, Disp: 10 each, Rfl: 1    prednisoLONE acetate (PRED FORTE) 1 % ophthalmic suspension, 1 drop 4 (Four) Times a Day., Disp: , Rfl:     Syringe/Needle, Disp, (BD SafetyGlide Syringe/Needle) " "27G X 5/8\" 1 ML misc, Use 1 each Every 30 (Thirty) Days., Disp: 3 each, Rfl: 3    Review of Systems  Review of Systems   Constitutional:  Negative for chills and fever.   Gastrointestinal:  Negative for nausea and vomiting.   Musculoskeletal:  Positive for neck pain. Negative for back pain.   Skin:  Positive for color change (right sided facial bruising).   Neurological:  Negative for weakness, numbness and headaches.   Psychiatric/Behavioral:  The patient is not nervous/anxious.        Objective     Physical Exam:  Physical Exam  Vitals reviewed.   Pulmonary:      Effort: Pulmonary effort is normal.   Musculoskeletal:      Cervical back: Normal.      Thoracic back: Normal.      Lumbar back: Normal.   Skin:     General: Skin is warm and dry.      Findings: Bruising (right cheek, periorbital) present.   Neurological:      Mental Status: She is alert and oriented to person, place, and time.      Cranial Nerves: Cranial nerves 2-12 are intact.      Motor: Motor strength is normal.     Coordination: Finger-Nose-Finger Test normal.   Psychiatric:         Speech: Speech normal.       Neurologic Exam     Mental Status   Oriented to person, place, and time.   Speech: speech is normal   Level of consciousness: alert  Knowledge: good.     Cranial Nerves   Cranial nerves II through XII intact.     Motor Exam   Muscle bulk: normal  Overall muscle tone: normal    Strength   Strength 5/5 throughout.     Sensory Exam   Light touch normal.     Gait, Coordination, and Reflexes     Coordination   Finger to nose coordination: normal  Gait not tested d/t fall risk         Results Review:  LABS:    Admission on 05/06/2024   Component Date Value Ref Range Status    Glucose 05/06/2024 105 (H)  65 - 99 mg/dL Final    BUN 05/06/2024 17  8 - 23 mg/dL Final    Creatinine 05/06/2024 0.69  0.57 - 1.00 mg/dL Final    Sodium 05/06/2024 135 (L)  136 - 145 mmol/L Final    Potassium 05/06/2024 4.1  3.5 - 5.2 mmol/L Final    Slight hemolysis detected " by analyzer. Result may be falsely elevated.    Chloride 05/06/2024 98  98 - 107 mmol/L Final    CO2 05/06/2024 26.3  22.0 - 29.0 mmol/L Final    Calcium 05/06/2024 9.0  8.2 - 9.6 mg/dL Final    Total Protein 05/06/2024 6.9  6.0 - 8.5 g/dL Final    Albumin 05/06/2024 4.1  3.5 - 5.2 g/dL Final    ALT (SGPT) 05/06/2024 14  1 - 33 U/L Final    AST (SGOT) 05/06/2024 24  1 - 32 U/L Final    Alkaline Phosphatase 05/06/2024 92  39 - 117 U/L Final    Total Bilirubin 05/06/2024 0.3  0.0 - 1.2 mg/dL Final    Globulin 05/06/2024 2.8  gm/dL Final    A/G Ratio 05/06/2024 1.5  g/dL Final    BUN/Creatinine Ratio 05/06/2024 24.6  7.0 - 25.0 Final    Anion Gap 05/06/2024 10.7  5.0 - 15.0 mmol/L Final    eGFR 05/06/2024 79.5  >60.0 mL/min/1.73 Final    Magnesium 05/06/2024 2.2  1.7 - 2.3 mg/dL Final    WBC 05/06/2024 4.40  3.40 - 10.80 10*3/mm3 Final    RBC 05/06/2024 3.77  3.77 - 5.28 10*6/mm3 Final    Hemoglobin 05/06/2024 11.4 (L)  12.0 - 15.9 g/dL Final    Hematocrit 05/06/2024 35.8  34.0 - 46.6 % Final    MCV 05/06/2024 95.0  79.0 - 97.0 fL Final    MCH 05/06/2024 30.2  26.6 - 33.0 pg Final    MCHC 05/06/2024 31.8  31.5 - 35.7 g/dL Final    RDW 05/06/2024 13.7  12.3 - 15.4 % Final    RDW-SD 05/06/2024 47.5  37.0 - 54.0 fl Final    MPV 05/06/2024 10.0  6.0 - 12.0 fL Final    Platelets 05/06/2024 205  140 - 450 10*3/mm3 Final    Neutrophil % 05/06/2024 62.5  42.7 - 76.0 % Final    Lymphocyte % 05/06/2024 25.2  19.6 - 45.3 % Final    Monocyte % 05/06/2024 10.0  5.0 - 12.0 % Final    Eosinophil % 05/06/2024 1.4  0.3 - 6.2 % Final    Basophil % 05/06/2024 0.7  0.0 - 1.5 % Final    Immature Grans % 05/06/2024 0.2  0.0 - 0.5 % Final    Neutrophils, Absolute 05/06/2024 2.75  1.70 - 7.00 10*3/mm3 Final    Lymphocytes, Absolute 05/06/2024 1.11  0.70 - 3.10 10*3/mm3 Final    Monocytes, Absolute 05/06/2024 0.44  0.10 - 0.90 10*3/mm3 Final    Eosinophils, Absolute 05/06/2024 0.06  0.00 - 0.40 10*3/mm3 Final    Basophils, Absolute 05/06/2024  0.03  0.00 - 0.20 10*3/mm3 Final    Immature Grans, Absolute 05/06/2024 0.01  0.00 - 0.05 10*3/mm3 Final    nRBC 05/06/2024 0.0  0.0 - 0.2 /100 WBC Final    Protime 05/06/2024 14.0  11.7 - 14.2 Seconds Final    INR 05/06/2024 1.06  0.90 - 1.10 Final    Glucose 05/06/2024 109 (H)  65 - 99 mg/dL Final    BUN 05/06/2024 16  8 - 23 mg/dL Final    Creatinine 05/06/2024 0.60  0.57 - 1.00 mg/dL Final    Sodium 05/06/2024 135 (L)  136 - 145 mmol/L Final    Potassium 05/06/2024 4.1  3.5 - 5.2 mmol/L Final    Chloride 05/06/2024 104  98 - 107 mmol/L Final    CO2 05/06/2024 21.0 (L)  22.0 - 29.0 mmol/L Final    Calcium 05/06/2024 8.5  8.2 - 9.6 mg/dL Final    BUN/Creatinine Ratio 05/06/2024 26.7 (H)  7.0 - 25.0 Final    Anion Gap 05/06/2024 10.0  5.0 - 15.0 mmol/L Final    eGFR 05/06/2024 82.3  >60.0 mL/min/1.73 Final    Glucose 05/07/2024 83  65 - 99 mg/dL Final    BUN 05/07/2024 14  8 - 23 mg/dL Final    Creatinine 05/07/2024 0.56 (L)  0.57 - 1.00 mg/dL Final    Sodium 05/07/2024 138  136 - 145 mmol/L Final    Potassium 05/07/2024 3.6  3.5 - 5.2 mmol/L Final    Chloride 05/07/2024 107  98 - 107 mmol/L Final    CO2 05/07/2024 25.0  22.0 - 29.0 mmol/L Final    Calcium 05/07/2024 8.0 (L)  8.2 - 9.6 mg/dL Final    BUN/Creatinine Ratio 05/07/2024 25.0  7.0 - 25.0 Final    Anion Gap 05/07/2024 6.0  5.0 - 15.0 mmol/L Final    eGFR 05/07/2024 83.6  >60.0 mL/min/1.73 Final    WBC 05/07/2024 3.65  3.40 - 10.80 10*3/mm3 Final    RBC 05/07/2024 3.48 (L)  3.77 - 5.28 10*6/mm3 Final    Hemoglobin 05/07/2024 10.6 (L)  12.0 - 15.9 g/dL Final    Hematocrit 05/07/2024 32.5 (L)  34.0 - 46.6 % Final    MCV 05/07/2024 93.4  79.0 - 97.0 fL Final    MCH 05/07/2024 30.5  26.6 - 33.0 pg Final    MCHC 05/07/2024 32.6  31.5 - 35.7 g/dL Final    RDW 05/07/2024 13.6  12.3 - 15.4 % Final    RDW-SD 05/07/2024 45.7  37.0 - 54.0 fl Final    MPV 05/07/2024 10.8  6.0 - 12.0 fL Final    Platelets 05/07/2024 210  140 - 450 10*3/mm3 Final    Neutrophil %  05/07/2024 49.1  42.7 - 76.0 % Final    Lymphocyte % 05/07/2024 33.7  19.6 - 45.3 % Final    Monocyte % 05/07/2024 13.4 (H)  5.0 - 12.0 % Final    Eosinophil % 05/07/2024 3.0  0.3 - 6.2 % Final    Basophil % 05/07/2024 0.5  0.0 - 1.5 % Final    Immature Grans % 05/07/2024 0.3  0.0 - 0.5 % Final    Neutrophils, Absolute 05/07/2024 1.79  1.70 - 7.00 10*3/mm3 Final    Lymphocytes, Absolute 05/07/2024 1.23  0.70 - 3.10 10*3/mm3 Final    Monocytes, Absolute 05/07/2024 0.49  0.10 - 0.90 10*3/mm3 Final    Eosinophils, Absolute 05/07/2024 0.11  0.00 - 0.40 10*3/mm3 Final    Basophils, Absolute 05/07/2024 0.02  0.00 - 0.20 10*3/mm3 Final    Immature Grans, Absolute 05/07/2024 0.01  0.00 - 0.05 10*3/mm3 Final    nRBC 05/07/2024 0.0  0.0 - 0.2 /100 WBC Final       DIAGNOSTICS:  CT head 5/7: Stable appearing subdural hematoma in the right lateral cerebral hemisphere when compared to previous study 5/6/24.    CT cervical spine: No acute fracture.    CT facial bones: No fracture      Results Review:   I reviewed the patient's new clinical results.  I personally viewed the patient's CT head, it was also reviewed by and discussed with Dr Moyer    Vital Signs   Temp:  [97.9 °F (36.6 °C)] 97.9 °F (36.6 °C)  Heart Rate:  [77-99] 88  Resp:  [18] 18  BP: ()/() 141/61      Assessment & Plan       Subdural hematoma    89-year-old female on daily aspirin and Celebrex found to have right subdural hematoma after a fall on the treadmill yesterday.  No red flags on exam.  CT head today is stable with no increased hemorrhage noted.  No further imaging needed during this admission.  From neurosurgery standpoint patient can be downgraded from the ICU.  We will see her in the office in 4 weeks with a repeat CT head at that time.  Neurosurgery will sign off, please feel free to call with any questions or concerns.      PLAN:   -Okay to downgrade to telemetry from ICU  -Office follow-up in 4 weeks with CT head  -Remain off  "aspirin/Celebrex until follow-up appointment    I discussed the patient's findings and my recommendations with patient, family, nursing staff, and Dr Moyer    During patient visit, I utilized appropriate personal protective equipment including gloves.  Appropriate PPE was worn during the entire visit.  Hand hygiene was completed before and after.     Fadumo Horta, APRN  05/07/24  08:48 EDT    \"Dictated utilizing Dragon dictation\".      "

## 2024-05-07 NOTE — PROGRESS NOTES
"  Daily Progress Note.   [facility] EMERGENCY DEPARTMENT  5/7/2024    Patient:  Name:  Cristela Peralta  MRN:  [id number]  4/14/1928  89 y.o.  female         CC: fall with SDH    Interval History:  No acute events  afebrile  Patient awake alert she was getting off the treadmill but instead of turning it off she turned it up.  This was a mechanical fall no syncope no chest pains.  She is awake and alert.  She denies any change in her vision she denies any pain.  She feels like she is doing very well.  She is very functional at baseline she was at the gym and walking.  She takes all her ADLs she eats a normal diet.      Physical Exam:  /66   Pulse 79   Temp 97.9 °F (36.6 °C)   Resp 18   Ht 167.6 cm (66\")   Wt 62.6 kg (138 lb 0.1 oz)   LMP  (LMP Unknown) Comment: TOTAL  SpO2 95%   BMI 22.28 kg/m²   Body mass index is 22.28 kg/m².  No intake or output data in the 24 hours ending 05/07/24 0524  General appearance: Nontoxic, conversant   Eyes: anicteric sclerae, moist conjunctivae; no lidlag;    HENT: Significant ecchymosis swelling right side of her face; oropharynx clear with moist mucous membranes    Neck: Trachea midline;  supple   Lungs: CTA, with normal respiratory effort and no intercostal retractions  CV: RRR, positive murmur systolic  Abdomen: Soft, nontender; BS+  Extremities: No peripheral edema or ext lad positive minimal abrasion over her right kneecap significant ecchymosis right side of her face  Skin: WWP no diffuse visible rash  Psych: Appropriate affect, alert   Neuro: Moves all ext. CN II-XII. Speech intact.    Data Review:  Notable Labs:  Results from last 7 days   Lab Units 05/06/24  1750   WBC 10*3/mm3 4.40   HEMOGLOBIN g/dL 11.4*   PLATELETS 10*3/mm3 205     Results from last 7 days   Lab Units 05/06/24  2100 05/06/24  1750   SODIUM mmol/L 135* 135*   POTASSIUM mmol/L 4.1 4.1   CHLORIDE mmol/L 104 98   CO2 mmol/L 21.0* 26.3   BUN mg/dL 16 17   CREATININE mg/dL 0.60 0.69 "   GLUCOSE mg/dL 109* 105*   CALCIUM mg/dL 8.5 9.0   MAGNESIUM mg/dL  --  2.2   Estimated Creatinine Clearance: 54.2 mL/min (by C-G formula based on SCr of 0.6 mg/dL).    Results from last 7 days   Lab Units 05/06/24  1750   AST (SGOT) U/L 24   ALT (SGPT) U/L 14   PLATELETS 10*3/mm3 205             Imaging:  Reviewed chest images personally from past 3 days    ASSESSMENT  /  PLAN:  Subdural hematoma  Right periorbital hematoma  Right sided facial bruising  Hypertensive urgency on Cardene drip  Hyponatremia  GERD  Depression anxiety      Resume home meds if can take po per nursing bedside swallow and nsy instructions       Traumatic no syncope episode  Neuro checks per nsy  Sbp less than 150  Ivfs until taking po stop IV fluids start regular diet.  Serial imaging per nsy they have cleared patient for discharge  Monitor sodium    PT  Monitor hematoma right side of the face  Resume home medications  Pain control  Discussed with bedside nursing  Downgrade to telemetry    All issues new to me today.  Prior hospital course, labs and imaging reviewed.       Electronically signed by Apolinar Flores MD, 05/07/24, 5:24 AM EDT.  [facility]

## 2024-05-08 ENCOUNTER — READMISSION MANAGEMENT (OUTPATIENT)
Dept: CALL CENTER | Facility: HOSPITAL | Age: 89
End: 2024-05-08
Payer: OTHER GOVERNMENT

## 2024-05-08 VITALS
DIASTOLIC BLOOD PRESSURE: 71 MMHG | SYSTOLIC BLOOD PRESSURE: 169 MMHG | BODY MASS INDEX: 23.6 KG/M2 | HEART RATE: 89 BPM | WEIGHT: 146.83 LBS | HEIGHT: 66 IN | TEMPERATURE: 98.2 F | RESPIRATION RATE: 18 BRPM | OXYGEN SATURATION: 97 %

## 2024-05-08 LAB
ANION GAP SERPL CALCULATED.3IONS-SCNC: 8 MMOL/L (ref 5–15)
BASOPHILS # BLD AUTO: 0.02 10*3/MM3 (ref 0–0.2)
BASOPHILS NFR BLD AUTO: 0.5 % (ref 0–1.5)
BUN SERPL-MCNC: 10 MG/DL (ref 8–23)
BUN/CREAT SERPL: 19.6 (ref 7–25)
CALCIUM SPEC-SCNC: 7.7 MG/DL (ref 8.2–9.6)
CHLORIDE SERPL-SCNC: 106 MMOL/L (ref 98–107)
CO2 SERPL-SCNC: 24 MMOL/L (ref 22–29)
CREAT SERPL-MCNC: 0.51 MG/DL (ref 0.57–1)
DEPRECATED RDW RBC AUTO: 46.8 FL (ref 37–54)
EGFRCR SERPLBLD CKD-EPI 2021: 85.5 ML/MIN/1.73
EOSINOPHIL # BLD AUTO: 0.13 10*3/MM3 (ref 0–0.4)
EOSINOPHIL NFR BLD AUTO: 3.3 % (ref 0.3–6.2)
ERYTHROCYTE [DISTWIDTH] IN BLOOD BY AUTOMATED COUNT: 13.6 % (ref 12.3–15.4)
GLUCOSE SERPL-MCNC: 94 MG/DL (ref 65–99)
HCT VFR BLD AUTO: 31.2 % (ref 34–46.6)
HGB BLD-MCNC: 10.2 G/DL (ref 12–15.9)
IMM GRANULOCYTES # BLD AUTO: 0.01 10*3/MM3 (ref 0–0.05)
IMM GRANULOCYTES NFR BLD AUTO: 0.3 % (ref 0–0.5)
LYMPHOCYTES # BLD AUTO: 1.22 10*3/MM3 (ref 0.7–3.1)
LYMPHOCYTES NFR BLD AUTO: 30.6 % (ref 19.6–45.3)
MCH RBC QN AUTO: 30.7 PG (ref 26.6–33)
MCHC RBC AUTO-ENTMCNC: 32.7 G/DL (ref 31.5–35.7)
MCV RBC AUTO: 94 FL (ref 79–97)
MONOCYTES # BLD AUTO: 0.5 10*3/MM3 (ref 0.1–0.9)
MONOCYTES NFR BLD AUTO: 12.5 % (ref 5–12)
NEUTROPHILS NFR BLD AUTO: 2.11 10*3/MM3 (ref 1.7–7)
NEUTROPHILS NFR BLD AUTO: 52.8 % (ref 42.7–76)
NRBC BLD AUTO-RTO: 0 /100 WBC (ref 0–0.2)
PLATELET # BLD AUTO: 184 10*3/MM3 (ref 140–450)
PMV BLD AUTO: 9.9 FL (ref 6–12)
POTASSIUM SERPL-SCNC: 4 MMOL/L (ref 3.5–5.2)
RBC # BLD AUTO: 3.32 10*6/MM3 (ref 3.77–5.28)
SODIUM SERPL-SCNC: 138 MMOL/L (ref 136–145)
WBC NRBC COR # BLD AUTO: 3.99 10*3/MM3 (ref 3.4–10.8)

## 2024-05-08 PROCEDURE — 36415 COLL VENOUS BLD VENIPUNCTURE: CPT | Performed by: INTERNAL MEDICINE

## 2024-05-08 PROCEDURE — 85025 COMPLETE CBC W/AUTO DIFF WBC: CPT | Performed by: INTERNAL MEDICINE

## 2024-05-08 PROCEDURE — 25810000003 SODIUM CHLORIDE 0.9 % SOLUTION: Performed by: INTERNAL MEDICINE

## 2024-05-08 PROCEDURE — 97530 THERAPEUTIC ACTIVITIES: CPT

## 2024-05-08 PROCEDURE — 97161 PT EVAL LOW COMPLEX 20 MIN: CPT

## 2024-05-08 PROCEDURE — 80048 BASIC METABOLIC PNL TOTAL CA: CPT | Performed by: INTERNAL MEDICINE

## 2024-05-08 RX ORDER — PREDNISOLONE ACETATE 10 MG/ML
1 SUSPENSION/ DROPS OPHTHALMIC 4 TIMES DAILY
Status: DISCONTINUED | OUTPATIENT
Start: 2024-05-08 | End: 2024-05-08 | Stop reason: HOSPADM

## 2024-05-08 RX ADMIN — PREDNISOLONE ACETATE 1 DROP: 10 SUSPENSION/ DROPS OPHTHALMIC at 08:06

## 2024-05-08 RX ADMIN — PANTOPRAZOLE SODIUM 40 MG: 40 TABLET, DELAYED RELEASE ORAL at 08:06

## 2024-05-08 RX ADMIN — SODIUM CHLORIDE 75 ML/HR: 9 INJECTION, SOLUTION INTRAVENOUS at 00:36

## 2024-05-08 RX ADMIN — ACETAMINOPHEN 325MG 650 MG: 325 TABLET ORAL at 10:09

## 2024-05-08 RX ADMIN — ROSUVASTATIN CALCIUM 20 MG: 20 TABLET, FILM COATED ORAL at 08:06

## 2024-05-08 RX ADMIN — SENNOSIDES AND DOCUSATE SODIUM 2 TABLET: 50; 8.6 TABLET ORAL at 08:06

## 2024-05-08 RX ADMIN — CITALOPRAM 10 MG: 20 TABLET, FILM COATED ORAL at 08:06

## 2024-05-08 RX ADMIN — HYDROCHLOROTHIAZIDE 12.5 MG: 12.5 TABLET ORAL at 08:06

## 2024-05-08 NOTE — PLAN OF CARE
Goal Outcome Evaluation:  Plan of Care Reviewed With: patient        Progress: no change  Outcome Evaluation: All needs met. Rested well. Up w/ assist x 1 to toilet. Cardiac diet. VSS. Oriented x 4. Bruising to R face and neck. Swelling to R face. Neuro checks q4h and intact. SR on the monitor. RA. IVF continued. No compalints.

## 2024-05-08 NOTE — THERAPY DISCHARGE NOTE
Patient Name: Cristela Peralta  : 1928    MRN: [id number]                              Today's Date: 2024       Admit Date: 2024    Visit Dx:     ICD-10-CM ICD-9-CM   1. Subdural hematoma  S06.5XAA 432.1   2. Hypertensive emergency  I16.1 401.9     Patient Active Problem List   Diagnosis    Diastolic dysfunction    Hyperlipidemia    Hypertension    Chronic low back pain    Osteoarthritis    Other osteoporosis without current pathological fracture    Vitamin D deficiency    Chronic depression    PVD (peripheral vascular disease)    Stenosis of right carotid artery    Acute lower GI bleeding    B12 deficiency    Subdural hematoma     Past Medical History:   Diagnosis Date    Anxiety     After death of spouse    Cataract     Clotting disorder      and     Degeneration of cervical intervertebral disc     Depression     Diverticulosis     GI bleed     H/O bone density study 2015    History of depression     History of diastolic dysfunction     History of diverticulosis     History of EKG 2015    History of herpes genitalis     History of mammogram 2015    History of medical problems     PVD, talia    History of spinal stenosis     History of vertigo     HL (hearing loss)     Hyperlipidemia     Hypertension     Osteoarthritis     Osteopenia     Osteoporosis     Visual impairment      Past Surgical History:   Procedure Laterality Date    APPENDECTOMY      COLONOSCOPY N/A 2020    Procedure: COLONOSCOPY;  Surgeon: Tung Muir MD;  Location: [facility] ENDOSCOPY;  Service: Gastroenterology    ENDOSCOPY N/A 2020    Procedure: ESOPHAGOGASTRODUODENOSCOPY;  Surgeon: Yanet Obregon MD;  Location: [facility] ENDOSCOPY;  Service: Gastroenterology    EYE SURGERY      Desima replacement    HYSTERECTOMY      TOTAL    JOINT REPLACEMENT      KNEE SURGERY      Replacement    TOTAL ABDOMINAL HYSTERECTOMY WITH SALPINGO OOPHORECTOMY        General Information       Row Name  05/08/24 0928          Physical Therapy Time and Intention    Document Type evaluation;discharge evaluation/summary  -     Mode of Treatment individual therapy;physical therapy  -       Row Name 05/08/24 0928          General Information    Patient Profile Reviewed yes  -     Prior Level of Function independent:;gait;transfer;bed mobility  -     Existing Precautions/Restrictions no known precautions/restrictions  -     Barriers to Rehab none identified  -       Row Name 05/08/24 0928          Living Environment    People in Home alone  -[facility] Name 05/08/24 0928          Home Main Entrance    Number of Stairs, Main Entrance none  -[facility] Name 05/08/24 0928          Stairs Within Home, Primary    Number of Stairs, Within Home, Primary none  -[facility] Name 05/08/24 0928          Cognition    Orientation Status (Cognition) oriented x 4  -[facility] Name 05/08/24 0928          Safety Issues, Functional Mobility    Impairments Affecting Function (Mobility) balance;endurance/activity tolerance  -               User Key  (r) = Recorded By, (t) = Taken By, (c) = Cosigned By      Initials Name Provider Type     Clair Haynes PT Physical Therapist                   Mobility       [facility] Name 05/08/24 0928          Bed Mobility    Comment, (Bed Mobility) NT - standing in bathroom on arrival, sitting EOB at end of session  -[facility] Name 05/08/24 0928          Sit-Stand Transfer    Sit-Stand [place] (Transfers) independent  -[facility] Name 05/08/24 0928          Gait/Stairs (Locomotion)    [place] Level (Gait) independent  -     Distance in Feet (Gait) 400  -     Deviations/Abnormal Patterns (Gait) gait speed decreased  -     [place] Level (Stairs) stand by assist  -     Handrail Location (Stairs) both sides  -     Number of Steps (Stairs) 8 step ups on bottom step in stair well, alternating lead leg  -     Comment, (Gait/Stairs) 1 brief standing rest break   -               User Key  (r) = Recorded By, (t) = Taken By, (c) = Cosigned By      Initials Name Provider Type     Clair Haynes PT Physical Therapist                   Obj/Interventions       Row Name 05/08/24 0930          Range of Motion Comprehensive    General Range of Motion bilateral lower extremity ROM L  -BH       Row Name 05/08/24 0930          Strength Comprehensive (MMT)    General Manual Muscle Testing (MMT) Assessment no strength deficits identified  -     Comment, General Manual Muscle Testing (MMT) Assessment BLE WFL  -BH       Row Name 05/08/24 0930          Balance    Balance Assessment sitting static balance;sitting dynamic balance;standing static balance;standing dynamic balance  -     Static Sitting Balance independent  -     Dynamic Sitting Balance independent  -     Position, Sitting Balance unsupported  -     Static Standing Balance independent  -     Dynamic Standing Balance independent  -     Position/Device Used, Standing Balance unsupported  -     Balance Interventions sitting;standing;sit to stand;static;dynamic  -BH       Row Name 05/08/24 0930          Sensory Assessment (Somatosensory)    Sensory Assessment (Somatosensory) LE sensation intact  -               User Key  (r) = Recorded By, (t) = Taken By, (c) = Cosigned By      Initials Name Provider Type     Clair Haynes PT Physical Therapist                   Goals/Plan    No documentation.                  Clinical Impression       Row Name 05/08/24 0930          Pain    Pretreatment Pain Rating 0/10 - no pain  -     Posttreatment Pain Rating 0/10 - no pain  -BH       Row Name 05/08/24 0930          Plan of Care Review    Plan of Care Reviewed With patient;daughter  -     Outcome Evaluation Pt is a 89 yo F admitted after a fall off a treadmill at the gym - pt states she tried to turn it off but must had turned the speed up instead. Work-up revealed acute subdural hematoma and R facial hematoma.  Pt reports independence at BL with no AD, lives alone with no steps and no other falls hx. Pt presents to PT appearing to be at her BL. Pt standing in bathroom on arrival, demo'd independence donning clothes and shoes. Stood independently and ambulated 400ft independently with no AD. Pt taking 1 brief standing rest break, then did 8 step-ups on bottom step in stairwell, states this is what she does at home to keep her legs strong. Pt is very active for her age, going to the gym 3x/week and reports walking on the treadmill for 20-30 minutes as well as using 1# weights for UE exercises. Pt safe to DC home with family support as needed, no need for further acute PT, will sign-off.  -       Row Name 05/08/24 0930          Therapy Assessment/Plan (PT)    Criteria for Skilled Interventions Met (PT) no problems identified which require skilled intervention;no  -     Therapy Frequency (PT) evaluation only  -       Row Name 05/08/24 0901          Vital Signs    O2 Delivery Pre Treatment room air  -     O2 Delivery Intra Treatment room air  -     O2 Delivery Post Treatment room air  -       Row Name 05/08/24 0928          Positioning and Restraints    Pre-Treatment Position bathroom  -     Post Treatment Position bed  -     In Bed sitting EOB;with family/caregiver;encouraged to call for assist;call light within reach  -               User Key  (r) = Recorded By, (t) = Taken By, (c) = Cosigned By      Initials Name Provider Type     Clair Haynes, PT Physical Therapist                   Outcome Measures       Row Name 05/08/24 5707          How much help from another person do you currently need...    Turning from your back to your side while in flat bed without using bedrails? 4  -BH     Moving from lying on back to sitting on the side of a flat bed without bedrails? 4  -BH     Moving to and from a bed to a chair (including a wheelchair)? 4  -BH     Standing up from a chair using your arms (e.g.,  wheelchair, bedside chair)? 4  -     Climbing 3-5 steps with a railing? 4  -     To walk in hospital room? 4  -     AM-PAC 6 Clicks Score (PT) 24  -     Highest Level of Mobility Goal 8 --> Walked 250 feet or more  -       Row Name 05/08/24 0933          Functional Assessment    Outcome Measure Options AM-PAC 6 Clicks Basic Mobility (PT)  -               User Key  (r) = Recorded By, (t) = Taken By, (c) = Cosigned By      Initials Name Provider Type     Clair Haynes, PT Physical Therapist                  Physical Therapy Education       Title: PT OT SLP Therapies (Done)       Topic: Physical Therapy (Done)       Point: Mobility training (Done)       Learning Progress Summary             Patient Acceptance, E,D,TB, VU by  at 5/8/2024 0934                         Point: Home exercise program (Done)       Learning Progress Summary             Patient Acceptance, E,D,TB, VU by  at 5/8/2024 0934                         Point: Body mechanics (Done)       Learning Progress Summary             Patient Acceptance, E,D,TB, VU by  at 5/8/2024 0934                         Point: Precautions (Done)       Learning Progress Summary             Patient Acceptance, E,D,TB, VU by  at 5/8/2024 0934                                         User Key       Initials Effective Dates Name Provider Type Discipline     04/08/22 -  Clair Haynes PT Physical Therapist PT                  PT Recommendation and Plan     Plan of Care Reviewed With: patient, daughter  Outcome Evaluation: Pt is a 89 yo F admitted after a fall off a treadmill at the gym - pt states she tried to turn it off but must had turned the speed up instead. Work-up revealed acute subdural hematoma and R facial hematoma. Pt reports independence at BL with no AD, lives alone with no steps and no other falls hx. Pt presents to PT appearing to be at her BL. Pt standing in bathroom on arrival, demo'd independence donning clothes and shoes. Stood  independently and ambulated 400ft independently with no AD. Pt taking 1 brief standing rest break, then did 8 step-ups on bottom step in [facility], states this is what she does at home to keep her legs strong. Pt is very active for her age, going to the gym 3x/week and reports walking on the treadmill for 20-30 minutes as well as using 1# weights for UE exercises. Pt safe to DC home with family support as needed, no need for further acute PT, will sign-off.     Time Calculation:   PT Evaluation Complexity  History, PT Evaluation Complexity: 1-2 personal factors and/or comorbidities  Examination of Body Systems (PT Eval Complexity): total of 3 or more elements  Clinical Presentation (PT Evaluation Complexity): stable  Clinical Decision Making (PT Evaluation Complexity): low complexity  Overall Complexity (PT Evaluation Complexity): low complexity     PT Charges       Row Name 05/08/24 0934             Time Calculation    Start Time 0906  -      Stop Time 0925  -      Time Calculation (min) 19 min  -      PT Received On 05/08/24  -         Time Calculation- PT    Total Timed Code Minutes- PT 10 minute(s)  -         Timed Charges    58688 - PT Therapeutic Activity Minutes 10  -BH         Total Minutes    Timed Charges Total Minutes 10  -       Total Minutes 10  -BH                User Key  (r) = Recorded By, (t) = Taken By, (c) = Cosigned By      Initials Name Provider Type     Clair Haynes, PT Physical Therapist                  Therapy Charges for Today       Code Description Service Date Service Provider Modifiers Qty    [id number]  PT EVAL LOW COMPLEXITY 2 5/8/2024 Clair Haynes, PT GP 1    [id number]  PT THERAPEUTIC ACT EA 15 MIN 5/8/2024 Clair Haynes, PT GP 1            PT G-Codes  Outcome Measure Options: AM-PAC 6 Clicks Basic Mobility (PT)  AM-PAC 6 Clicks Score (PT): 24    PT Discharge Summary  Anticipated Discharge Disposition (PT): home    Clair Haynes PT  5/8/2024

## 2024-05-08 NOTE — PROGRESS NOTES
Case Management Discharge Note      Final Note: Pt discharged home, PT recommended home, no needs noted. Babs Mancilla LCSW         Selected Continued Care - Discharged on 5/8/2024 Admission date: 5/6/2024 - Discharge disposition: Home or Self Care      Destination    No services have been selected for the patient.                Durable Medical Equipment    No services have been selected for the patient.                Dialysis/Infusion    No services have been selected for the patient.                Home Medical Care    No services have been selected for the patient.                Therapy    No services have been selected for the patient.                Community Resources    No services have been selected for the patient.                Community & DME    No services have been selected for the patient.                    Transportation Services  Private: Car    Final Discharge Disposition Code: 01 - home or self-care

## 2024-05-08 NOTE — DISCHARGE SUMMARY
PHYSICIAN DISCHARGE SUMMARY                                                                        [facility]    Date of admit: 5/6/2024  Date of Discharge:  5/8/2024    PCP: Arelis Lara MD    Discharge Diagnosis:     Subdural hematoma  Subdural hematoma  Right periorbital hematoma  Right sided facial bruising  Hypertensive urgency on Cardene drip  Hyponatremia  GERD  Depression anxiety    Secondary Diagnoses:  Past Medical History:   Diagnosis Date    Anxiety     After death of spouse    Cataract     Clotting disorder     2020 and 2024    Degeneration of cervical intervertebral disc     Depression     Diverticulosis     GI bleed     H/O bone density study 12/11/2015    History of depression     History of diastolic dysfunction     History of diverticulosis     History of EKG 04/29/2015    History of herpes genitalis     History of mammogram 12/11/2015    History of medical problems     PVD, talia    History of spinal stenosis     History of vertigo     HL (hearing loss)     Hyperlipidemia     Hypertension     Osteoarthritis     Osteopenia     Osteoporosis     Visual impairment        Procedures Performed           Consults:       Hospital Course  Patient is a 89 y.o. female presented with   CC: Fall on the treadmill with injury to face     History of Present Illness:  89-year-old female with history of anxiety depression presented to the emergency room status post fall on the treadmill.  Apparently she was trying to stop the treadmill but inadvertently hit something to increase the speed of the treadmill.  She fell forward hitting her face mainly on the right side against the treadmill track.  No loss of consciousness was reported.  She complained of pain on her right cheek and periorbital region with significant swelling located there.  No visual change was reported.  History of left corneal transplant.  Not on any blood thinners  other than baby aspirin at this time.  Currently in the ER complains of pain on the right side of her face where she has significant bruising.  No chest pain no nausea vomiting or headache as such reported.  Further workup in the emergency room with CT head revealed Suspicious for small right lateral convexity acute subdural hematoma.  We were asked to admit to the ICU.        Patient was admitted with subdural hematoma after mechanical fall at home.  Seen by neurosurgery.  Repeat imaging stable and no need for surgical intervention.  Transferred out of intensive care and doing well.  Will hold her antiplatelet therapy with aspirin and Celebrex for now.  Follow-up with primary care.  Home with family today.  Discussed with her daughter at bedside.          Condition on Discharge:  Stable.      Vital Signs  Temp:  [97.5 °F (36.4 °C)-98.2 °F (36.8 °C)] 98.2 °F (36.8 °C)  Heart Rate:  [73-98] 89  Resp:  [18] 18  BP: (106-171)/(54-83) 169/71    Physical Exam:  General Appearance: no acute distress, appears comfortable  Heart: regular rate and rhythm  Lungs: clear to auscultation bilaterally, respirations unlabored  Abdomen: soft, nontender, nondistended, no guarding/rebound, nl BS  Extremeties: no edema, no cyanosis  Neurology: speech normal, mental status normal, moving all four extremities  Mental Status: alert, oriented        --  Consults:   IP CONSULT TO NEUROSURGERY  IP CONSULT TO PULMONOLOGY  IP CONSULT TO NEUROSURGERY    Significant Discharge Diagnostics   Procedures Performed:         Pertinent Lab Results:  Results from last 7 days   Lab Units 05/08/24  0715 05/07/24  0551 05/06/24  2100 05/06/24  1750   SODIUM mmol/L 138 138 135* 135*   POTASSIUM mmol/L 4.0 3.6 4.1 4.1   CHLORIDE mmol/L 106 107 104 98   CO2 mmol/L 24.0 25.0 21.0* 26.3   BUN mg/dL 10 14 16 17   CREATININE mg/dL 0.51* 0.56* 0.60 0.69   GLUCOSE mg/dL 94 83 109* 105*   CALCIUM mg/dL 7.7* 8.0* 8.5 9.0   AST (SGOT) U/L  --   --   --  24   ALT  "(SGPT) U/L  --   --   --  14         Results from last 7 days   Lab Units 05/08/24  0714 05/07/24  0551 05/06/24  1750   WBC 10*3/mm3 3.99 3.65 4.40   HEMOGLOBIN g/dL 10.2* 10.6* 11.4*   HEMATOCRIT % 31.2* 32.5* 35.8   PLATELETS 10*3/mm3 184 210 205   MCV fL 94.0 93.4 95.0   MCH pg 30.7 30.5 30.2   MCHC g/dL 32.7 32.6 31.8   RDW % 13.6 13.6 13.7   RDW-SD fl 46.8 45.7 47.5   MPV fL 9.9 10.8 10.0   NEUTROPHIL % % 52.8 49.1 62.5   LYMPHOCYTE % % 30.6 33.7 25.2   MONOCYTES % % 12.5* 13.4* 10.0   EOSINOPHIL % % 3.3 3.0 1.4   BASOPHIL % % 0.5 0.5 0.7   IMM GRAN % % 0.3 0.3 0.2   NEUTROS ABS 10*3/mm3 2.11 1.79 2.75   LYMPHS ABS 10*3/mm3 1.22 1.23 1.11   MONOS ABS 10*3/mm3 0.50 0.49 0.44   EOS ABS 10*3/mm3 0.13 0.11 0.06   BASOS ABS 10*3/mm3 0.02 0.02 0.03   IMMATURE GRANS (ABS) 10*3/mm3 0.01 0.01 0.01   NRBC /100 WBC 0.0 0.0 0.0     Results from last 7 days   Lab Units 05/06/24  1935   INR  1.06     Results from last 7 days   Lab Units 05/06/24  1750   MAGNESIUM mg/dL 2.2           Invalid input(s): \"LDLCALC\"                                            Imaging Results:  Imaging Results (All)       Procedure Component Value Units Date/Time    CT Head Without Contrast [[id number]] Collected: 05/07/24 0720     Updated: 05/07/24 0744    Narrative:      CT HEAD WITHOUT CONTRAST     CLINICAL HISTORY: Follow-up subdural hematoma.     TECHNIQUE: CT scan of the head was obtained with 3 mm axial soft tissue  algorithm images. No intravenous contrast was administered. Sagittal and  coronal reconstructions were obtained.     COMPARISON: CT head dated 5/6/2024.     FINDINGS:       There is a stable appearance of the previously identified right lateral  cerebral hemispheric subdural hematoma, principally lateral to the right  frontal and parietal lobes. This subdural hematoma measures up to  approximately 2 mm in maximal diameter lateral to the right parietal  lobe.     There are moderate to severe changes of chronic small vessel " ischemic  phenomena. A chronic infarct is seen within the right caudate, anterior  limb of the internal capsule, and lentiform nucleus with measures up to  approximately 1.2 x 0.8 cm in greatest axial dimensions. Additionally,  old lacunar disease is noted within the right lentiform nucleus.  Otherwise, the ventricles, sulci, and cisterns are age-appropriate. No  acute appearing abnormality is noted within the gray-white matter  differentiation. Atherosclerotic calcifications are incidentally  appreciated within the intracranial vasculature.     Incidental note is made of scalp soft tissue swelling lateral to the  right zygomatic arch.       Impression:         Stable findings when compared to the prior study dated 5/6/2024. Again  noted is a thin right lateral cerebral hemispheric subdural hematoma,  principally lateral to the right frontal and parietal lobes, measuring  up to 2 mm in maximal diameter and not resulting in any significant mass  effect.     Incidental chronic intracranial findings are as discussed above.        Radiation dose reduction techniques were utilized, including automated  exposure control and exposure modulation based on body size.     This report was finalized on 5/7/2024 7:41 AM by Dr. Charbel Mora M.D  on Workstation: [id number]       CT Facial Bones Without Contrast [[id number]] Collected: 05/06/24 1752     Updated: 05/06/24 2010    Narrative:      CT FACIAL BONES, CERVICAL SPINE AND CT HEAD WITHOUT CONTRAST     HISTORY: Fall, facial pain, neck pain.     COMPARISON: CT angiogram neck and head 12/08/2023.     CT FACIAL BONES WITHOUT CONTRAST: The frontal, ethmoid, sphenoid and  maxillary sinuses are clear. A large premaxillary and periorbital  hematoma is appreciated on the right. There is no evidence of fracture.     CT EXAMINATION OF THE CERVICAL SPINE WITHOUT CONTRAST:      There is fusion of the facets to the right at C2-3. There is moderate to  severe loss of disc height at C3-4,  C4-5, C5-6 and C6-7. There is grade  1 retrolisthesis C3 upon C4 estimated to be approximately 3 mm. There is  no evidence of fracture.     C2-3: There is no evidence of disc bulge or herniation.     C3-4: A central disc osteophyte complex is present with no evidence of  herniation. Mild foraminal stenosis is present on the right secondary to  loss of disc height, retrolisthesis of C3 upon C4 and extension of the  disc osteophyte complex into the neural foramen.     C4-5: A central disc osteophyte complex is present resulting in mild  flattening of the ventral surface of the thecal sac.     C5-6: A small central disc osteophyte complex is present with no  evidence of herniation.     C6-7: Small central disc osteophyte complex is present with no evidence  of herniation.     C7-T1: There is mild to moderate foraminal stenosis on the right  secondary to uncovertebral degenerative disease.       Impression:      Multilevel degenerative disease involving the cervical spine  is noted as described above. There is no evidence of fracture. See  above.        The above information was called to and discussed with Dr. Chou.           Radiation dose reduction techniques were utilized, including automated  exposure control and exposure modulation based on body size.        This report was finalized on 5/6/2024 8:07 PM by Dr. Rojelio Campbell M.D  on Workstation: [id number]       CT Cervical Spine Without Contrast [[id number]] Collected: 05/06/24 1752     Updated: 05/06/24 2010    Narrative:      CT FACIAL BONES, CERVICAL SPINE AND CT HEAD WITHOUT CONTRAST     HISTORY: Fall, facial pain, neck pain.     COMPARISON: CT angiogram neck and head 12/08/2023.     CT FACIAL BONES WITHOUT CONTRAST: The frontal, ethmoid, sphenoid and  maxillary sinuses are clear. A large premaxillary and periorbital  hematoma is appreciated on the right. There is no evidence of fracture.     CT EXAMINATION OF THE CERVICAL SPINE WITHOUT CONTRAST:       There is fusion of the facets to the right at C2-3. There is moderate to  severe loss of disc height at C3-4, C4-5, C5-6 and C6-7. There is grade  1 retrolisthesis C3 upon C4 estimated to be approximately 3 mm. There is  no evidence of fracture.     C2-3: There is no evidence of disc bulge or herniation.     C3-4: A central disc osteophyte complex is present with no evidence of  herniation. Mild foraminal stenosis is present on the right secondary to  loss of disc height, retrolisthesis of C3 upon C4 and extension of the  disc osteophyte complex into the neural foramen.     C4-5: A central disc osteophyte complex is present resulting in mild  flattening of the ventral surface of the thecal sac.     C5-6: A small central disc osteophyte complex is present with no  evidence of herniation.     C6-7: Small central disc osteophyte complex is present with no evidence  of herniation.     C7-T1: There is mild to moderate foraminal stenosis on the right  secondary to uncovertebral degenerative disease.       Impression:      Multilevel degenerative disease involving the cervical spine  is noted as described above. There is no evidence of fracture. See  above.        The above information was called to and discussed with Dr. Chou.           Radiation dose reduction techniques were utilized, including automated  exposure control and exposure modulation based on body size.        This report was finalized on 5/6/2024 8:07 PM by Dr. Rojelio Campbell M.D  on Workstation: [id number]       CT Head Without Contrast [[id number]] Collected: 05/06/24 1728     Updated: 05/06/24 1740    Narrative:      CT HEAD WO CONTRAST-     INDICATION: Fall, right-sided head injury and pain.     COMPARISON: CT head December 8, 2023     TECHNIQUE:  Routine CT head without IV contrast. Coronal and sagittal reformats.  Radiation dose reduction techniques were utilized, including automated  exposure control and exposure modulation based on body  "size.     FINDINGS:      Brain: No intraparenchymal hemorrhage. Chronic appearing lacunar type  infarction seen in the right basal ganglia. Chronic small vessel  ischemic changes. Preserved gray-white differentiation.     Ventricles: No intraventricular hemorrhage. No hydrocephalus.     Extra-axial spaces: Suspicious for small subdural hematoma along the  right lateral convexity, series 2, axial mage 28 and series 5, coronal  image 28, measuring 2 to 3 mm.  Scalp: Right periorbital hematoma, extends into the right face.     Orbits: Cataract extractions. Symmetric globes.     Osseous structures: No fracture.     Sinuses: Patent mastoid air cells and middle ears. Mucosal thickening in  the right maxillary sinus and bilateral ethmoid air cells.       Impression:      Suspicious for small right lateral convexity acute subdural hematoma.  Short follow-up recommended to reevaluate.     Call Report: Dr. Chou was notified by telephone of the above  findings on May 6, 2024 at 5:35 p.m.     This report was finalized on 5/6/2024 5:37 PM by Dr. Rafael Luong M.D  on Workstation: [id number]             --    Discharge Disposition  Home or Self Care    Discharge Medications     Discharge Medications        Continue These Medications        Instructions Start Date   acetaminophen 500 MG tablet  Commonly known as: TYLENOL   500 mg, Oral, Every 6 Hours PRN      BD SafetyGlide Syringe/Needle 27G X 5/8\" 1 ML misc  Generic drug: Syringe/Needle (Disp)   1 each, Does not apply, Every 30 Days      citalopram 10 MG tablet  Commonly known as: CeleXA   10 mg, Oral, Daily      CoQ-10 100 MG capsule   Take 1 capsule by mouth Daily.      cyanocobalamin 1000 MCG/ML injection   1,000 mcg, Subcutaneous, Every 28 Days      hydroCHLOROthiazide 12.5 MG tablet   12.5 mg, Oral, Daily      pantoprazole 40 MG EC tablet  Commonly known as: PROTONIX   40 mg, Oral, 2 Times Daily      prednisoLONE acetate 1 % ophthalmic suspension  Commonly known " "as: PRED FORTE   1 drop, 4 Times Daily      rosuvastatin 20 MG tablet  Commonly known as: Crestor   20 mg, Oral, Daily      SURE COMFORT INS SYR .5CC/28G 28G X 1/2\" 0.5 ML misc  Generic drug: Insulin Syringe/Needle   USE ONCE MONTHLY FOR SUB-Q B-12 INJECTIONS             Stop These Medications      aspirin 81 MG EC tablet     celecoxib 200 MG capsule  Commonly known as: CeleBREX              Discharge Diet: regular diet    Activity at Discharge:      Follow-up Information       Arelis Lara MD .    Specialty: Internal Medicine  Contact information:  [address] [place] [address]  [phone]                           See primary care provider, Arelis Lara MD, in 1-2 weeks        Test Results Pending at Discharge       Brian Gaming MD  [facility]  Pulmonary and Critical Care Medicine  05/08/24  08:28 EDT    Time: greater than 30 minutes.        Total time spent discharging patient including evaluation, post hospitalization follow up, medication and post hospitalization instructions and education total time exceeds 30 minutes.    "

## 2024-05-08 NOTE — PLAN OF CARE
Goal Outcome Evaluation:  Plan of Care Reviewed With: patient, daughter           Outcome Evaluation: Pt is a 89 yo F admitted after a fall off a treadmill at the gym - pt states she tried to turn it off but must had turned the speed up instead. Work-up revealed acute subdural hematoma and R facial hematoma. Pt reports independence at BL with no AD, lives alone with no steps and no other falls hx. Pt presents to PT appearing to be at her BL. Pt standing in bathroom on arrival, demo'd independence donning clothes and shoes. Stood independently and ambulated 400ft independently with no AD. Pt taking 1 brief standing rest break, then [facility] 8 step[facility], states this is what she does at home to keep her legs strong. Pt is very active for her age, going to the gym 3x/week and reports walking on the treadmill for 20-30 minutes as well as using 1# weights for UE exercises. Pt safe to DC home with family support as needed, no need for further acute PT, will sign-off.      Anticipated Discharge Disposition (PT): home

## 2024-05-09 ENCOUNTER — TRANSITIONAL CARE MANAGEMENT TELEPHONE ENCOUNTER (OUTPATIENT)
Dept: CALL CENTER | Facility: HOSPITAL | Age: 89
End: 2024-05-09
Payer: OTHER GOVERNMENT

## 2024-06-03 ENCOUNTER — TELEPHONE (OUTPATIENT)
Dept: NEUROSURGERY | Facility: CLINIC | Age: 89
End: 2024-06-03
Payer: OTHER GOVERNMENT

## 2024-06-03 NOTE — TELEPHONE ENCOUNTER
Called and talk to [place] to ask her if we could move her appmt back to Friday due to CT is on Tuesday with appmt to see Dr. Moyer on Wednesday.  I wanted to make sure her CT results would be ready by her appmt. She is calling her daughter (who brings her to her appmts) to see if she can do Thursday or Friday this week.

## 2024-06-03 NOTE — PROGRESS NOTES
Patient ID: Cristela Peralta is a 89 y.o. female is here today for a hospital follow-up for subdural hematoma.    Imaging: CT of the head performed on 06/04/2024    Subjective     The patient is here in regards to   Chief Complaint   Patient presents with    subdural hematoma    Follow-up       History of Present Illness  Virginia is doing well.  No new neurological complaints.  She still does have some residual edema over her right face and maxilla where there is a palpable nodule that is nontender to palpation but clearly asymmetrical compared to her left cheek.      While in the room and during my examination of the patient I wore a mask and eye protection.  I washed my hands before and after this patient encounter.  The patient was also wearing a mask.    The following portions of the patient's history were reviewed and updated as appropriate: allergies, current medications, past family history, past medical history, past social history, past surgical history and problem list.    Review of Systems   Eyes:  Positive for visual disturbance (right sided blurriness).   Musculoskeletal:  Positive for gait problem (balance issues) and neck pain (shoulder pain).   Neurological:  Negative for dizziness, weakness, light-headedness, numbness and headaches.        Past Medical History:   Diagnosis Date    Anxiety     After death of spouse    Cataract     Clotting disorder     2020 and 2024    Degeneration of cervical intervertebral disc     Depression     Diverticulosis     GI bleed     H/O bone density study 12/11/2015    History of depression     History of diastolic dysfunction     History of diverticulosis     History of EKG 04/29/2015    History of herpes genitalis     History of mammogram 12/11/2015    History of medical problems     PVD, talia    History of spinal stenosis     History of vertigo     HL (hearing loss)     Hyperlipidemia     Hypertension     Osteoarthritis     Osteopenia     Osteoporosis     Visual  impairment        Allergies   Allergen Reactions    Neomycin-Bacitracin Zn-Polymyx Rash    Atorvastatin Other (See Comments)     LEG CRAMPS    Pravastatin Other (See Comments)     LEG CRAMPS    Alendronate GI Intolerance    Sulfa Antibiotics Hives and Rash       Family History   Problem Relation Age of Onset    Aneurysm Father     Heart disease Father         Multiple MI    Arthritis Father     Colon cancer Sister     Throat cancer Sister     Anxiety disorder Sister     Arthritis Sister         RA and osteoarthritis    Anxiety disorder Sister     Arthritis Sister        Social History     Socioeconomic History    Marital status:    Tobacco Use    Smoking status: Never     Passive exposure: Past    Smokeless tobacco: Never   Vaping Use    Vaping status: Never Used   Substance and Sexual Activity    Alcohol use: No    Drug use: Never    Sexual activity: Not Currently     Partners: Male     Birth control/protection: Post-menopausal       Past Surgical History:   Procedure Laterality Date    APPENDECTOMY      COLONOSCOPY N/A 01/02/2020    Procedure: COLONOSCOPY;  Surgeon: Tung Muir MD;  Location: [facility];  Service: Gastroenterology    ENDOSCOPY N/A 01/01/2020    Procedure: ESOPHAGOGASTRODUODENOSCOPY;  Surgeon: Yanet Obregon MD;  Location: [facility];  Service: Gastroenterology    EYE SURGERY      Desima replacement    HYSTERECTOMY      TOTAL    JOINT REPLACEMENT      KNEE SURGERY      Replacement    TOTAL ABDOMINAL HYSTERECTOMY WITH SALPINGO OOPHORECTOMY           Objective     Vitals:    06/05/24 1249   BP: 128/64   Pulse: 82   Resp: 16   Temp: 97.7 °F (36.5 °C)   SpO2: 95%     Body mass index is 21.53 kg/m².    Physical Exam  Constitutional:       Appearance: Normal appearance.   HENT:      Head: Normocephalic and atraumatic.        Comments: Palpable nodule in the right cheek with some swelling but no discoloration or tenderness  Eyes:      Extraocular Movements: Extraocular  movements intact.      Conjunctiva/sclera: Conjunctivae normal.      Pupils: Pupils are equal, round, and reactive to light.   Cardiovascular:      Rate and Rhythm: Normal rate and regular rhythm.      Pulses: Normal pulses.   Pulmonary:      Breath sounds: Normal breath sounds.   Abdominal:      Palpations: Abdomen is soft.   Musculoskeletal:         General: Normal range of motion.      Cervical back: Normal range of motion and neck supple.   Skin:     General: Skin is warm and dry.   Neurological:      Mental Status: She is alert and oriented to person, place, and time.      Cranial Nerves: Cranial nerves 2-12 are intact.      Motor: Motor function is intact. No weakness or atrophy.      Coordination: Coordination is intact. Romberg sign negative. Romberg Test normal.      Gait: Gait is intact. Gait normal.      Deep Tendon Reflexes: Reflexes are normal and symmetric.      Reflex Scores:       Tricep reflexes are 2+ on the right side and 2+ on the left side.       Bicep reflexes are 2+ on the right side and 2+ on the left side.       Brachioradialis reflexes are 2+ on the right side and 2+ on the left side.       Patellar reflexes are 2+ on the right side and 2+ on the left side.       Achilles reflexes are 2+ on the right side and 2+ on the left side.  Psychiatric:         Speech: Speech normal.         Neurologic Exam     Mental Status   Oriented to person, place, and time.   Attention: normal. Concentration: normal.   Speech: speech is normal   Level of consciousness: alert    Cranial Nerves   Cranial nerves II through XII intact.     CN III, IV, VI   Pupils are equal, round, and reactive to light.    Motor Exam   Muscle bulk: normal  Overall muscle tone: normal    Strength   Strength 5/5 except as noted.     Sensory Exam   Light touch normal.     Gait, Coordination, and Reflexes     Gait  Gait: normal    Coordination   Romberg: negative    Reflexes   Reflexes 2+ except as noted.   Right brachioradialis:  2+  Left brachioradialis: 2+  Right biceps: 2+  Left biceps: 2+  Right triceps: 2+  Left triceps: 2+  Right patellar: 2+  Left patellar: 2+  Right achilles: 2+  Left achilles: 2+      Assessment & Plan   Independent Review of Radiographic Studies:      I personally reviewed the images from the following studies.    CT: CT of the head was reviewed and shows interval resolution of previous small subdural hematoma    Assessment/Plan: Safe to resume aspirin and Celebrex clinically indicated.  She does have a small nodule over her right cheek.  It is possible that she has a piece of broken bone but it is also possible just some residual swelling.  Will obtain a CT of the facial bones    Medical Decision Making:      CT facial bones         Diagnoses and all orders for this visit:    1. Subdural hematoma (Primary)  -     CT facial bones wo contrast; Future             Patient Instructions/Recommendations:    Follow-up as needed      Wilbert Moyer MD  06/05/24  13:10 EDT

## 2024-06-04 ENCOUNTER — HOSPITAL ENCOUNTER (OUTPATIENT)
Dept: CT IMAGING | Facility: HOSPITAL | Age: 89
Discharge: HOME | End: 2024-06-04
Admitting: NURSE PRACTITIONER
Payer: MEDICARE

## 2024-06-04 DIAGNOSIS — S06.5XAA SUBDURAL HEMATOMA: ICD-10-CM

## 2024-06-04 PROCEDURE — 70450 CT HEAD/BRAIN W/O DYE: CPT

## 2024-06-05 ENCOUNTER — OFFICE VISIT (OUTPATIENT)
Dept: NEUROSURGERY | Facility: CLINIC | Age: 89
End: 2024-06-05
Payer: MEDICARE

## 2024-06-05 VITALS
WEIGHT: 133.4 LBS | OXYGEN SATURATION: 95 % | SYSTOLIC BLOOD PRESSURE: 128 MMHG | HEIGHT: 66 IN | BODY MASS INDEX: 21.44 KG/M2 | HEART RATE: 82 BPM | TEMPERATURE: 97.7 F | DIASTOLIC BLOOD PRESSURE: 64 MMHG | RESPIRATION RATE: 16 BRPM

## 2024-06-05 DIAGNOSIS — S06.5XAA SUBDURAL HEMATOMA: Primary | ICD-10-CM

## 2024-06-10 DIAGNOSIS — E78.5 HYPERLIPIDEMIA, UNSPECIFIED HYPERLIPIDEMIA TYPE: ICD-10-CM

## 2024-06-10 DIAGNOSIS — I10 PRIMARY HYPERTENSION: Primary | ICD-10-CM

## 2024-06-11 LAB
ALBUMIN SERPL-MCNC: 4.4 G/DL (ref 3.5–5.2)
ALBUMIN/GLOB SERPL: 2 G/DL
ALP SERPL-CCNC: 79 U/L (ref 39–117)
ALT SERPL-CCNC: 9 U/L (ref 1–33)
AST SERPL-CCNC: 20 U/L (ref 1–32)
BASOPHILS # BLD AUTO: 0.02 10*3/MM3 (ref 0–0.2)
BASOPHILS NFR BLD AUTO: 0.6 % (ref 0–1.5)
BILIRUB SERPL-MCNC: 0.4 MG/DL (ref 0–1.2)
BUN SERPL-MCNC: 19 MG/DL (ref 8–23)
BUN/CREAT SERPL: 26.4 (ref 7–25)
CALCIUM SERPL-MCNC: 9 MG/DL (ref 8.2–9.6)
CHLORIDE SERPL-SCNC: 101 MMOL/L (ref 98–107)
CHOLEST SERPL-MCNC: 158 MG/DL (ref 0–200)
CO2 SERPL-SCNC: 28.1 MMOL/L (ref 22–29)
CREAT SERPL-MCNC: 0.72 MG/DL (ref 0.57–1)
EGFRCR SERPLBLD CKD-EPI 2021: 76.6 ML/MIN/1.73
EOSINOPHIL # BLD AUTO: 0.08 10*3/MM3 (ref 0–0.4)
EOSINOPHIL NFR BLD AUTO: 2.3 % (ref 0.3–6.2)
ERYTHROCYTE [DISTWIDTH] IN BLOOD BY AUTOMATED COUNT: 14.7 % (ref 12.3–15.4)
GLOBULIN SER CALC-MCNC: 2.2 GM/DL
GLUCOSE SERPL-MCNC: 95 MG/DL (ref 65–99)
HCT VFR BLD AUTO: 34.3 % (ref 34–46.6)
HDLC SERPL-MCNC: 57 MG/DL (ref 40–60)
HGB BLD-MCNC: 11.2 G/DL (ref 12–15.9)
IMM GRANULOCYTES # BLD AUTO: 0.01 10*3/MM3 (ref 0–0.05)
IMM GRANULOCYTES NFR BLD AUTO: 0.3 % (ref 0–0.5)
LDLC SERPL CALC-MCNC: 83 MG/DL (ref 0–100)
LYMPHOCYTES # BLD AUTO: 1.05 10*3/MM3 (ref 0.7–3.1)
LYMPHOCYTES NFR BLD AUTO: 29.9 % (ref 19.6–45.3)
MCH RBC QN AUTO: 30.6 PG (ref 26.6–33)
MCHC RBC AUTO-ENTMCNC: 32.7 G/DL (ref 31.5–35.7)
MCV RBC AUTO: 93.7 FL (ref 79–97)
MONOCYTES # BLD AUTO: 0.36 10*3/MM3 (ref 0.1–0.9)
MONOCYTES NFR BLD AUTO: 10.3 % (ref 5–12)
NEUTROPHILS # BLD AUTO: 1.99 10*3/MM3 (ref 1.7–7)
NEUTROPHILS NFR BLD AUTO: 56.6 % (ref 42.7–76)
NRBC BLD AUTO-RTO: 0 /100 WBC (ref 0–0.2)
PLATELET # BLD AUTO: 210 10*3/MM3 (ref 140–450)
POTASSIUM SERPL-SCNC: 4.4 MMOL/L (ref 3.5–5.2)
PROT SERPL-MCNC: 6.6 G/DL (ref 6–8.5)
RBC # BLD AUTO: 3.66 10*6/MM3 (ref 3.77–5.28)
SODIUM SERPL-SCNC: 138 MMOL/L (ref 136–145)
TRIGL SERPL-MCNC: 98 MG/DL (ref 0–150)
VLDLC SERPL CALC-MCNC: 18 MG/DL (ref 5–40)
WBC # BLD AUTO: 3.51 10*3/MM3 (ref 3.4–10.8)

## 2024-06-14 ENCOUNTER — OFFICE VISIT (OUTPATIENT)
Dept: INTERNAL MEDICINE | Facility: CLINIC | Age: 89
End: 2024-06-14
Payer: OTHER GOVERNMENT

## 2024-06-14 VITALS
BODY MASS INDEX: 21.21 KG/M2 | HEIGHT: 66 IN | HEART RATE: 82 BPM | OXYGEN SATURATION: 96 % | WEIGHT: 132 LBS | DIASTOLIC BLOOD PRESSURE: 60 MMHG | SYSTOLIC BLOOD PRESSURE: 150 MMHG

## 2024-06-14 DIAGNOSIS — M54.2 NECK PAIN: ICD-10-CM

## 2024-06-14 DIAGNOSIS — I10 PRIMARY HYPERTENSION: Primary | ICD-10-CM

## 2024-06-14 DIAGNOSIS — R26.89 IMBALANCE: ICD-10-CM

## 2024-06-14 DIAGNOSIS — E78.5 HYPERLIPIDEMIA, UNSPECIFIED HYPERLIPIDEMIA TYPE: ICD-10-CM

## 2024-06-14 PROCEDURE — 90480 ADMN SARSCOV2 VAC 1/ONLY CMP: CPT | Performed by: INTERNAL MEDICINE

## 2024-06-14 PROCEDURE — 91320 SARSCV2 VAC 30MCG TRS-SUC IM: CPT | Performed by: INTERNAL MEDICINE

## 2024-06-14 PROCEDURE — 99214 OFFICE O/P EST MOD 30 MIN: CPT | Performed by: INTERNAL MEDICINE

## 2024-06-14 RX ORDER — ROSUVASTATIN CALCIUM 20 MG/1
20 TABLET, COATED ORAL DAILY
Qty: 90 TABLET | Refills: 3 | Status: SHIPPED | OUTPATIENT
Start: 2024-06-14

## 2024-06-14 RX ORDER — ACYCLOVIR 400 MG/1
400 TABLET ORAL 3 TIMES DAILY PRN
Qty: 90 TABLET | Refills: 3 | Status: SHIPPED | OUTPATIENT
Start: 2024-06-14

## 2024-06-14 RX ORDER — CYANOCOBALAMIN 1000 UG/ML
1000 INJECTION, SOLUTION INTRAMUSCULAR; SUBCUTANEOUS
Qty: 10 ML | Refills: 3 | Status: SHIPPED | OUTPATIENT
Start: 2024-06-14

## 2024-06-14 RX ORDER — HYDROCHLOROTHIAZIDE 12.5 MG/1
12.5 TABLET ORAL DAILY
Qty: 90 TABLET | Refills: 1 | Status: SHIPPED | OUTPATIENT
Start: 2024-06-14

## 2024-06-14 RX ORDER — PANTOPRAZOLE SODIUM 40 MG/1
40 TABLET, DELAYED RELEASE ORAL 2 TIMES DAILY
Qty: 180 TABLET | Refills: 3 | Status: SHIPPED | OUTPATIENT
Start: 2024-06-14

## 2024-06-14 RX ORDER — CELECOXIB 200 MG/1
200 CAPSULE ORAL DAILY
Qty: 90 CAPSULE | Refills: 3 | Status: SHIPPED | OUTPATIENT
Start: 2024-06-14

## 2024-06-14 RX ORDER — CITALOPRAM HYDROBROMIDE 10 MG/1
10 TABLET ORAL DAILY
Qty: 90 TABLET | Refills: 1 | Status: SHIPPED | OUTPATIENT
Start: 2024-06-14

## 2024-06-14 NOTE — PROGRESS NOTES
Subjective     Cristela Peralta is a 89 y.o. female who presents with   Chief Complaint   Patient presents with    Hypertension    Hyperlipidemia       History of Present Illness     The following data was reviewed by: Arelis Lara MD on 06/14/2024:  CMP          5/7/2024    05:51 5/8/2024    07:15 6/11/2024    10:56   CMP   Glucose 83  94  95    BUN 14  10  19    Creatinine 0.56  0.51  0.72    EGFR 83.6  85.5     Sodium 138  138  138    Potassium 3.6  4.0  4.4    Chloride 107  106  101    Calcium 8.0  7.7  9.0    Total Protein   6.6    Albumin   4.4    Globulin   2.2    Total Bilirubin   0.4    Alkaline Phosphatase   79    AST (SGOT)   20    ALT (SGPT)   9    BUN/Creatinine Ratio 25.0  19.6  26.4    Anion Gap 6.0  8.0       CBC          5/7/2024    05:51 5/8/2024    07:14 6/11/2024    10:56   CBC   WBC 3.65  3.99  3.51    RBC 3.48  3.32  3.66    Hemoglobin 10.6  10.2  11.2    Hematocrit 32.5  31.2  34.3    MCV 93.4  94.0  93.7    MCH 30.5  30.7  30.6    MCHC 32.6  32.7  32.7    RDW 13.6  13.6  14.7    Platelets 210  184  210      Lipid Panel          12/11/2023    11:00 6/11/2024    10:56   Lipid Panel   Total Cholesterol 148  158    Triglycerides 88  98    HDL Cholesterol 58  57    VLDL Cholesterol 17  18    LDL Cholesterol  73  83      HTN.  Control is good at home.  HLD.  Good LDL control.      Having some issues with persistent neck pain and imbalance after recent fall.  She is interested in physical therapy.      Review of Systems   Respiratory: Negative.     Cardiovascular: Negative.    Musculoskeletal:  Positive for gait problem and neck pain.       The following portions of the patient's history were reviewed and updated as appropriate: allergies, current medications and problem list.    Patient Active Problem List    Diagnosis Date Noted    Subdural hematoma 05/06/2024    B12 deficiency 01/24/2024    Acute lower GI bleeding 01/10/2024    Stenosis of right carotid artery 12/16/2023     Note Last  "Updated: 6/14/2024     Chief Complaint: Carotid artery stenosis  Arterial Evaluation:Left leg. Right leg.  The patient does not complain of cramping pain with physical activity.  The patient does not complain of rest pain.  The patient does not complain of cutaneous ulcers.  Cerebral Vascular Evaluation:Doppler ultrasound from 11/2/2023 shows percentage of stenosis by doppler: right 70% left <50%. The patient denies weakness, facial drooping, sudden vision loss, headache, difficulty with  speech, syncope, dizziness, difficulty swallowing, and unsteady gait/balance.        PVD (peripheral vascular disease) 06/24/2019     Note Last Updated: 6/24/2019     By [employer] screen      Chronic depression 10/24/2018    Vitamin D deficiency 10/14/2016    Diastolic dysfunction 05/13/2016    Hyperlipidemia 05/13/2016    Hypertension 05/13/2016    Chronic low back pain 05/13/2016    Osteoarthritis 05/13/2016    Other osteoporosis without current pathological fracture 05/13/2016     Note Last Updated: 6/13/2023     S/p five years of bisphosphonates.  Prolia started in 2023/.           Current Outpatient Medications on File Prior to Visit   Medication Sig Dispense Refill    acetaminophen (TYLENOL) 500 MG tablet Take 1 tablet by mouth Every 6 (Six) Hours As Needed for Mild Pain.      Coenzyme Q10 (CoQ-10) 100 MG capsule Take 1 capsule by mouth Daily.      Syringe/Needle, Disp, (BD SafetyGlide Syringe/Needle) 27G X 5/8\" 1 ML misc Use 1 each Every 30 (Thirty) Days. 3 each 3     No current facility-administered medications on file prior to visit.       Objective     /60   Pulse 82   Ht 167.6 cm (65.98\")   Wt 59.9 kg (132 lb)   LMP  (LMP Unknown) Comment: TOTAL  SpO2 96%   BMI 21.32 kg/m²     Physical Exam  Constitutional:       Appearance: She is well-developed.   HENT:      Head: Normocephalic and atraumatic.   Cardiovascular:      Rate and Rhythm: Normal rate and regular rhythm.      Heart sounds: Normal heart sounds. "   Pulmonary:      Effort: Pulmonary effort is normal.      Breath sounds: Normal breath sounds.   Skin:     General: Skin is warm and dry.   Neurological:      Mental Status: She is alert and oriented to person, place, and time.   Psychiatric:         Behavior: Behavior normal.         Assessment & Plan   Diagnoses and all orders for this visit:    1. Primary hypertension (Primary)    2. Hyperlipidemia, unspecified hyperlipidemia type    3. Imbalance  -     Ambulatory Referral to Physical Therapy for Evaluation & Treatment    4. Neck pain  -     Ambulatory Referral to Physical Therapy for Evaluation & Treatment    Other orders  -     rosuvastatin (Crestor) 20 MG tablet; Take 1 tablet by mouth Daily.  Dispense: 90 tablet; Refill: 3  -     pantoprazole (PROTONIX) 40 MG EC tablet; Take 1 tablet by mouth 2 (Two) Times a Day.  Dispense: 180 tablet; Refill: 3  -     hydroCHLOROthiazide 12.5 MG tablet; Take 1 tablet by mouth Daily.  Dispense: 90 tablet; Refill: 1  -     citalopram (CeleXA) 10 MG tablet; Take 1 tablet by mouth Daily.  Dispense: 90 tablet; Refill: 1  -     celecoxib (CeleBREX) 200 MG capsule; Take 1 capsule by mouth Daily. Celecoxib Oral Capsule 200 MG  Dispense: 90 capsule; Refill: 3  -     acyclovir (ZOVIRAX) 400 MG tablet; Take 1 tablet by mouth 3 (Three) Times a Day As Needed (for five day for infection). Take no more than 5 doses a day.  Dispense: 90 tablet; Refill: 3  -     COVID-19 F23 (Pfizer) 12yrs+ (COMIRNATY)  -     cyanocobalamin 1000 MCG/ML injection; Inject 1 mL under the skin into the appropriate area as directed Every 28 (Twenty-Eight) Days.  Dispense: 10 mL; Refill: 3        Discussion    HTN.  Control is good at home.  The patient is advised to continue current dosage of HCTZ.  HLD. Control is good.  The patient is advised to continue current dosage of Crestor.    Neck pain/imbalance after recent fall with subdural.  Referral is placed to PT for evaluation.           Future Appointments    Date Time Provider Department Center   6/20/2024  1:00 PM [facility] RM 4  [facility]   6/20/2024  1:30 PM [facility] RM 1  [facility]   6/20/2024  2:15 PM Mark Gibson MD [facility]   6/27/2024 11:30 AM [facility] CT 1  [facility]   12/17/2024 10:20 AM [facility]   12/20/2024  2:15 PM Arelis Lara MD [facility]

## 2024-06-20 ENCOUNTER — HOSPITAL ENCOUNTER (OUTPATIENT)
Facility: HOSPITAL | Age: 89
Discharge: HOME | End: 2024-06-20
Payer: MEDICARE

## 2024-06-20 ENCOUNTER — OFFICE VISIT (OUTPATIENT)
Age: 89
End: 2024-06-20
Payer: OTHER GOVERNMENT

## 2024-06-20 VITALS
BODY MASS INDEX: 21.69 KG/M2 | SYSTOLIC BLOOD PRESSURE: 162 MMHG | HEIGHT: 66 IN | WEIGHT: 135 LBS | DIASTOLIC BLOOD PRESSURE: 70 MMHG

## 2024-06-20 DIAGNOSIS — I65.23 BILATERAL CAROTID ARTERY STENOSIS: ICD-10-CM

## 2024-06-20 DIAGNOSIS — I73.9 PAD (PERIPHERAL ARTERY DISEASE): ICD-10-CM

## 2024-06-20 DIAGNOSIS — I65.21 STENOSIS OF RIGHT CAROTID ARTERY: ICD-10-CM

## 2024-06-20 DIAGNOSIS — I73.9 CLAUDICATION: Primary | ICD-10-CM

## 2024-06-20 LAB
BH CV LOWER ARTERIAL LEFT ABI RATIO: 0.62
BH CV LOWER ARTERIAL LEFT DORSALIS PEDIS SYS MAX: 100
BH CV LOWER ARTERIAL LEFT POST TIBIAL SYS MAX: 80
BH CV LOWER ARTERIAL RIGHT ABI RATIO: 0.53
BH CV LOWER ARTERIAL RIGHT DORSALIS PEDIS SYS MAX: 84
BH CV LOWER ARTERIAL RIGHT POST TIBIAL SYS MAX: 86
BH CV XLRA MEAS LEFT CAROTID BULB EDV: 42.6 CM/SEC
BH CV XLRA MEAS LEFT CAROTID BULB PSV: 159.8 CM/SEC
BH CV XLRA MEAS LEFT DIST CCA EDV: 25.1 CM/SEC
BH CV XLRA MEAS LEFT DIST CCA PSV: 122.3 CM/SEC
BH CV XLRA MEAS LEFT DIST ICA EDV: -43.1 CM/SEC
BH CV XLRA MEAS LEFT DIST ICA PSV: -135.6 CM/SEC
BH CV XLRA MEAS LEFT ICA/CCA RATIO: 1.31
BH CV XLRA MEAS LEFT MID CCA EDV: 34.7 CM/SEC
BH CV XLRA MEAS LEFT MID CCA PSV: 123 CM/SEC
BH CV XLRA MEAS LEFT MID ICA EDV: -32.9 CM/SEC
BH CV XLRA MEAS LEFT MID ICA PSV: -107.9 CM/SEC
BH CV XLRA MEAS LEFT PROX CCA EDV: 18.6 CM/SEC
BH CV XLRA MEAS LEFT PROX CCA PSV: 94.4 CM/SEC
BH CV XLRA MEAS LEFT PROX ECA PSV: -243 CM/SEC
BH CV XLRA MEAS LEFT PROX ICA EDV: 37.8 CM/SEC
BH CV XLRA MEAS LEFT PROX ICA PSV: 144.3 CM/SEC
BH CV XLRA MEAS LEFT PROX SCLA PSV: 131.7 CM/SEC
BH CV XLRA MEAS LEFT VERTEBRAL A EDV: -20.4 CM/SEC
BH CV XLRA MEAS LEFT VERTEBRAL A PSV: -87.8 CM/SEC
BH CV XLRA MEAS RIGHT CAROTID BULB EDV: -50.5 CM/SEC
BH CV XLRA MEAS RIGHT CAROTID BULB PSV: -173.4 CM/SEC
BH CV XLRA MEAS RIGHT DIST CCA EDV: 19.2 CM/SEC
BH CV XLRA MEAS RIGHT DIST CCA PSV: 96 CM/SEC
BH CV XLRA MEAS RIGHT DIST ICA EDV: -22.5 CM/SEC
BH CV XLRA MEAS RIGHT DIST ICA PSV: -73.8 CM/SEC
BH CV XLRA MEAS RIGHT ICA/CCA RATIO: 4.56
BH CV XLRA MEAS RIGHT MID CCA EDV: 15.8 CM/SEC
BH CV XLRA MEAS RIGHT MID CCA PSV: 75.1 CM/SEC
BH CV XLRA MEAS RIGHT MID ICA EDV: -49.4 CM/SEC
BH CV XLRA MEAS RIGHT MID ICA PSV: -173.3 CM/SEC
BH CV XLRA MEAS RIGHT PROX CCA EDV: 10.4 CM/SEC
BH CV XLRA MEAS RIGHT PROX CCA PSV: 84.5 CM/SEC
BH CV XLRA MEAS RIGHT PROX ECA EDV: -18.2 CM/SEC
BH CV XLRA MEAS RIGHT PROX ECA PSV: -143.8 CM/SEC
BH CV XLRA MEAS RIGHT PROX ICA EDV: -105.4 CM/SEC
BH CV XLRA MEAS RIGHT PROX ICA PSV: -437.9 CM/SEC
BH CV XLRA MEAS RIGHT PROX SCLA PSV: 211.7 CM/SEC
BH CV XLRA MEAS RIGHT VERTEBRAL A EDV: 29.5 CM/SEC
BH CV XLRA MEAS RIGHT VERTEBRAL A PSV: 124.8 CM/SEC
LEFT ARM BP: NORMAL MMHG
RIGHT ARM BP: NORMAL MMHG
UPPER ARTERIAL LEFT ARM BRACHIAL SYS MAX: NORMAL
UPPER ARTERIAL RIGHT ARM BRACHIAL SYS MAX: NORMAL

## 2024-06-20 PROCEDURE — 93922 UPR/L XTREMITY ART 2 LEVELS: CPT

## 2024-06-20 PROCEDURE — 93880 EXTRACRANIAL BILAT STUDY: CPT

## 2024-06-20 NOTE — PROGRESS NOTES
Patient Name: Cristela Peralta    MRN: [id number] Encounter Date: 06/20/2024      Consulting Service: Vascular Surgery    Referring Provider: No ref. provider found       CHIEF COMPLAINT:  Chief Complaint   Patient presents with    Carotid Artery Disease     3M Follow up with CTD and ABIs       Subjective    HPI: Cristela Peralta is a 89 y.o. female is being seen for evaluation/management of of her structured exercise program which has done a great job of increasing her pressures in her legs by 10% on each side and getting her out of rest pain in her in her legs feel better.  Unfortunately a cane with the cost will fall off of her treadmill that her face and that is in the process of making recovery.  Her carotid disease has not really changed much of anything it looks a little better today and given the fact that we have decided to be very conservative with this for him to sit tight on both the carotid and her lower extremities and let her continue to try to walk herself healthy.  She will stay on her current medication list which include Crestor and baby aspirin.  At this point we will set up a 6-month follow-up although she has the right to obviously decided not to follow at that point if she does not feel like it.    PAST MEDICAL HISTORY:   Past Medical History:   Diagnosis Date    Anxiety     After death of spouse    Atherosclerosis of native arteries of extremities with intermittent claudication, bilateral legs 04/20/2023    PVD    Bilateral carotid artery stenosis 05/01/2023    Carotid bruit 04/20/2023    Cataract     Chronic low back pain 05/13/2016    Clotting disorder     2020 and 2024    Degeneration of cervical intervertebral disc     Depression     Diastolic dysfunction 05/13/2016    Diverticulosis     Essential (primary) hypertension 05/01/2023    GI bleed     H/O bone density study 12/11/2015    History of depression     History of diastolic dysfunction     History of diverticulosis     History of  EKG 04/29/2015    History of herpes genitalis     History of mammogram 12/11/2015    History of medical problems     PVD, talia    History of spinal stenosis     History of vertigo     HL (hearing loss)     Hyperlipidemia     Hyperlipidemia 05/13/2016    Hyperlipidemia, unspecified 04/20/2023    Hypertension     Hypertension 05/13/2016    Mild single current episode of major depressive disorder 10/24/2018    Osteoarthritis     Osteoarthritis 05/13/2016    Osteopenia     Osteopenia 05/13/2016    Osteoporosis     Peripheral vascular disease, unspecified 05/01/2023    PVD (peripheral vascular disease) 04/20/2023    PVD (peripheral vascular disease) 06/24/2019    Visual impairment     Vitamin D deficiency 10/14/2016      PAST SURGICAL HISTORY:   Past Surgical History:   Procedure Laterality Date    APPENDECTOMY      COLONOSCOPY N/A 01/02/2020    Procedure: COLONOSCOPY;  Surgeon: Tung Muir MD;  Location: [facility] ENDOSCOPY;  Service: Gastroenterology    ENDOSCOPY N/A 01/01/2020    Procedure: ESOPHAGOGASTRODUODENOSCOPY;  Surgeon: Yanet Obregon MD;  Location: [facility] ENDOSCOPY;  Service: Gastroenterology    EYE SURGERY      Desima replacement    HYSTERECTOMY      TOTAL    JOINT REPLACEMENT      KNEE SURGERY      Replacement    TOTAL ABDOMINAL HYSTERECTOMY WITH SALPINGO OOPHORECTOMY        FAMILY HISTORY:   Family History   Problem Relation Age of Onset    Aneurysm Father     Heart disease Father         Multiple MI    Arthritis Father     Colon cancer Sister     Throat cancer Sister     Anxiety disorder Sister     Arthritis Sister         RA and osteoarthritis    Anxiety disorder Sister     Arthritis Sister     Heart disease Sister     Cancer Sister         no details    Cancer Brother         no details    Heart disease Daughter     Aortic aneurysm Daughter         abdominal    Stroke Daughter       SOCIAL HISTORY:   Social History     Tobacco Use    Smoking status: Never     Passive exposure: Past     "Smokeless tobacco: Never    Tobacco comments:     Patient does not smoke   Vaping Use    Vaping status: Never Used   Substance Use Topics    Alcohol use: No     Comment: Patient is non drinker.    Drug use: Never     Comment: Drug Abuse: Not a drug user      MEDICATIONS:   Current Outpatient Medications on File Prior to Visit   Medication Sig Dispense Refill    acetaminophen (TYLENOL) 500 MG tablet Take 1 tablet by mouth Every 6 (Six) Hours As Needed for Mild Pain.      acyclovir (ZOVIRAX) 400 MG tablet Take 1 tablet by mouth 3 (Three) Times a Day As Needed (for five day for infection). Take no more than 5 doses a day. 90 tablet 3    Ascorbic Acid 500 MG chewable tablet Chew 500 mg Take As Directed.      ASPIRIN 81 PO Take 81 mg by mouth Take As Directed. Aspirin Oral Tablet Delayed Release 81 MG      celecoxib (CeleBREX) 200 MG capsule Take 1 capsule by mouth Daily. Celecoxib Oral Capsule 200 MG 90 capsule 3    cetirizine (zyrTEC) 10 MG tablet Take 1 tablet by mouth Take As Directed.      citalopram (CeleXA) 10 MG tablet Take 1 tablet by mouth Daily. 90 tablet 1    Coenzyme Q10 (CoQ-10) 100 MG capsule Take 1 capsule by mouth Daily.      cyanocobalamin 1000 MCG/ML injection Inject 1 mL under the skin into the appropriate area as directed Every 28 (Twenty-Eight) Days. 10 mL 3    hydroCHLOROthiazide 12.5 MG tablet Take 1 tablet by mouth Daily. 90 tablet 1    multivitamin with minerals (CENTRUM SILVER PO) Take 1 tablet by mouth Daily.      pantoprazole (PROTONIX) 40 MG EC tablet Take 1 tablet by mouth 2 (Two) Times a Day. 180 tablet 3    rosuvastatin (Crestor) 20 MG tablet Take 1 tablet by mouth Daily. 90 tablet 3    Sennosides (SENNA LAXATIVE PO) Take  by mouth As Needed.      Syringe/Needle, Disp, (BD SafetyGlide Syringe/Needle) 27G X 5/8\" 1 ML misc Use 1 each Every 30 (Thirty) Days. 3 each 3     No current facility-administered medications on file prior to visit.       ALLERGIES: Neomycin-bacitracin zn-polymyx, " "Atorvastatin, Pravastatin, Alendronate, and Sulfa antibiotics       Objective   Vitals:    06/20/24 1413 06/20/24 1414   BP: 150/72 162/70   BP Location: Right arm Left arm   Patient Position: Sitting Sitting   Cuff Size: Small Adult Small Adult   Weight: 61.2 kg (135 lb) 61.2 kg (135 lb)   Height: 168.9 cm (66.5\") 167.6 cm (66\")     Body mass index is 21.79 kg/m².  BMI is within normal parameters. No other follow-up for BMI required.      PHYSICAL EXAM:   Physical Exam  Constitutional:       Appearance: Normal appearance. She is normal weight.   HENT:      Head: Normocephalic and atraumatic.      Nose: Nose normal.   Eyes:      Extraocular Movements: Extraocular movements intact.      Pupils: Pupils are equal, round, and reactive to light.   Cardiovascular:      Rate and Rhythm: Normal rate.      Pulses:           Carotid pulses are 2+ on the right side with bruit and 2+ on the left side.       Radial pulses are 2+ on the right side and 2+ on the left side.        Femoral pulses are 2+ on the right side and 2+ on the left side.       Popliteal pulses are 0 on the right side and 0 on the left side.        Dorsalis pedis pulses are detected w/ Doppler on the right side and detected w/ Doppler on the left side.        Posterior tibial pulses are detected w/ Doppler on the right side and detected w/ Doppler on the left side.      Heart sounds: Normal heart sounds.   Pulmonary:      Effort: Pulmonary effort is normal.      Breath sounds: Normal breath sounds.   Abdominal:      General: Abdomen is flat. Bowel sounds are normal.      Palpations: Abdomen is soft.   Musculoskeletal:         General: Normal range of motion.      Cervical back: Normal range of motion and neck supple.      Right lower leg: No edema.      Left lower leg: No edema.   Skin:     General: Skin is warm and dry.   Neurological:      General: No focal deficit present.      Mental Status: She is alert and oriented to person, place, and time. Mental " status is at baseline.   Psychiatric:         Mood and Affect: Mood normal.         Thought Content: Thought content normal.          Result Review   LABS:      Results Review:       I reviewed the patient's new clinical results.    The following radiologic or non-invasive studies have been reviewed by me: Carotid duplex and ABIs both reviewed without significant change on the carotid staying right at 70% on the right side and less than 50% on the left.  ABIs have improved by almost 10% on both sides 0.53 on the right and 0.62 on the left  CT Head Without Contrast    Result Date: 6/4/2024  1. Since the prior head CT on 05/07/2024 there has been interval complete resolution of the 2 mm thick hyperdense acute subdural hematoma overlying the inferior lateral right frontoparietal convexity with no residual subdural hematoma seen.  2. There is moderate small vessel disease in the cerebral white matter. There is a 13 x 11 x 16 mm old lacunar infarct extending from the body of the right caudate nucleus through the anterior mid to mid right corona radiata region through the anterior limb of the right internal capsule into the anterior superior right putamen.  3. 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 On prior CT angiogram of the head neck 12/08/2023 this was just distal to a severely stenotic distal right M1 segment and furthermore there were stenotic origins of the M2 segments with focal aneurysmal dilatation of the right MCA bifurcation and correlation with the prior CT angiogram of the head and neck 12/08/2023 is suggested. The remainder of the head CT is normal.  Radiation dose reduction techniques were utilized, including automated exposure control and exposure modulation based on body size.   This report was finalized on 6/4/2024 4:09 PM by Dr. Harrison Mendez M.D on Workstation: [id number]                   ASSESSMENT/PLAN:   Diagnoses and all orders for this  visit:    1. Claudication (Primary)  -     Doppler Ankle Brachial Index Single Level CAR; Future    2. Stenosis of right carotid artery  -     Duplex Carotid Ultrasound CAR; Future       89 y.o. female with improvement in her ABIs and improvement in her foot symptoms at night.  Things are good as far as her carotid staying stable at around 70% in nature with the drop in her diastolic to 105 and her systolic remained stable at 437.  At this point in time I believe 6 months would be appropriate.  Will schedule and she will decide if she wants to pursue this follow-up.    I discussed the plan with the patient and family who are agreeable to the plan of care at this point. Thank you for this consult.   Follow Up  Return in about 6 months (around 12/20/2024).    Mark Gibson MD   06/20/24

## 2024-06-21 RX ORDER — CYANOCOBALAMIN 1000 UG/ML
1000 INJECTION, SOLUTION INTRAMUSCULAR; SUBCUTANEOUS
Qty: 3 ML | Refills: 3 | Status: SHIPPED | OUTPATIENT
Start: 2024-06-21

## 2024-06-21 RX ORDER — NEEDLES, SAFETY 22GX1 1/2"
1 NEEDLE, DISPOSABLE MISCELLANEOUS
Qty: 3 EACH | Refills: 3 | Status: SHIPPED | OUTPATIENT
Start: 2024-06-21

## 2024-06-27 ENCOUNTER — HOSPITAL ENCOUNTER (OUTPATIENT)
Dept: CT IMAGING | Facility: HOSPITAL | Age: 89
Discharge: HOME | End: 2024-06-27
Admitting: NEUROLOGICAL SURGERY
Payer: MEDICARE

## 2024-06-27 DIAGNOSIS — S06.5XAA SUBDURAL HEMATOMA: ICD-10-CM

## 2024-06-27 PROCEDURE — 70486 CT MAXILLOFACIAL W/O DYE: CPT

## 2024-07-01 DIAGNOSIS — R29.898 LEG WEAKNESS, BILATERAL: ICD-10-CM

## 2024-07-01 DIAGNOSIS — R26.89 IMBALANCE: Primary | ICD-10-CM

## 2024-07-01 DIAGNOSIS — M54.2 NECK PAIN: ICD-10-CM

## 2024-08-05 ENCOUNTER — LAB (OUTPATIENT)
Dept: OTHER | Facility: HOSPITAL | Age: 89
End: 2024-08-05
Payer: MEDICARE

## 2024-08-05 ENCOUNTER — INFUSION (OUTPATIENT)
Dept: ONCOLOGY | Facility: HOSPITAL | Age: 89
End: 2024-08-05
Payer: MEDICARE

## 2024-08-05 DIAGNOSIS — M81.8 OTHER OSTEOPOROSIS WITHOUT CURRENT PATHOLOGICAL FRACTURE: ICD-10-CM

## 2024-08-05 DIAGNOSIS — M81.8 OTHER OSTEOPOROSIS WITHOUT CURRENT PATHOLOGICAL FRACTURE: Primary | ICD-10-CM

## 2024-08-05 LAB
25(OH)D3 SERPL-MCNC: 35.9 NG/ML (ref 30–100)
ALBUMIN SERPL-MCNC: 3.9 G/DL (ref 3.5–5.2)
ALBUMIN/GLOB SERPL: 1.3 G/DL
ALP SERPL-CCNC: 89 U/L (ref 39–117)
ALT SERPL W P-5'-P-CCNC: 10 U/L (ref 1–33)
ANION GAP SERPL CALCULATED.3IONS-SCNC: 7 MMOL/L (ref 5–15)
AST SERPL-CCNC: 19 U/L (ref 1–32)
BILIRUB SERPL-MCNC: 0.3 MG/DL (ref 0–1.2)
BUN SERPL-MCNC: 18 MG/DL (ref 8–23)
BUN/CREAT SERPL: 24.3 (ref 7–25)
CALCIUM SPEC-SCNC: 9 MG/DL (ref 8.2–9.6)
CHLORIDE SERPL-SCNC: 100 MMOL/L (ref 98–107)
CO2 SERPL-SCNC: 28 MMOL/L (ref 22–29)
CREAT SERPL-MCNC: 0.74 MG/DL (ref 0.57–1)
EGFRCR SERPLBLD CKD-EPI 2021: 74.1 ML/MIN/1.73
GLOBULIN UR ELPH-MCNC: 2.9 GM/DL
GLUCOSE SERPL-MCNC: 102 MG/DL (ref 65–99)
MAGNESIUM SERPL-MCNC: 2.2 MG/DL (ref 1.7–2.3)
PHOSPHATE SERPL-MCNC: 3.4 MG/DL (ref 2.5–4.5)
POTASSIUM SERPL-SCNC: 4.4 MMOL/L (ref 3.5–5.2)
PROT SERPL-MCNC: 6.8 G/DL (ref 6–8.5)
SODIUM SERPL-SCNC: 135 MMOL/L (ref 136–145)

## 2024-08-05 PROCEDURE — 25010000002 DENOSUMAB 60 MG/ML SOLUTION PREFILLED SYRINGE: Performed by: INTERNAL MEDICINE

## 2024-08-05 PROCEDURE — 80053 COMPREHEN METABOLIC PANEL: CPT | Performed by: INTERNAL MEDICINE

## 2024-08-05 PROCEDURE — 83735 ASSAY OF MAGNESIUM: CPT | Performed by: INTERNAL MEDICINE

## 2024-08-05 PROCEDURE — 82306 VITAMIN D 25 HYDROXY: CPT | Performed by: INTERNAL MEDICINE

## 2024-08-05 PROCEDURE — 96372 THER/PROPH/DIAG INJ SC/IM: CPT

## 2024-08-05 PROCEDURE — 84100 ASSAY OF PHOSPHORUS: CPT | Performed by: INTERNAL MEDICINE

## 2024-08-05 RX ADMIN — DENOSUMAB 60 MG: 60 INJECTION SUBCUTANEOUS at 14:24

## 2024-08-05 NOTE — NURSING NOTE
Arrived  for prolia injection. Indication and side effects reviewed.Labs and medications verified. Prolia administered in r arm without incidence. Instructed to call prescribing MD for any concerns or questions and instructed on how to schedule future appts.  Pt vu and discharged in stable condition.

## 2024-10-18 ENCOUNTER — TELEPHONE (OUTPATIENT)
Dept: INTERNAL MEDICINE | Facility: CLINIC | Age: 89
End: 2024-10-18
Payer: OTHER GOVERNMENT

## 2024-10-18 RX ORDER — DEXTROMETHORPHAN HYDROBROMIDE AND PROMETHAZINE HYDROCHLORIDE 15; 6.25 MG/5ML; MG/5ML
5 SYRUP ORAL 4 TIMES DAILY PRN
Qty: 118 ML | Refills: 5 | Status: SHIPPED | OUTPATIENT
Start: 2024-10-18 | End: 2024-10-18

## 2024-10-18 NOTE — TELEPHONE ENCOUNTER
Patient's daughter called and stated that she would like some cough syrup called in for her mother. She stated that she gets a cough every spring and fall and does not want it to turn into bronchitis. Advised that patient would need an appointment but she she asked if I could send a message back to  to see if she can send prescription to pharmacy due to age.    Best call [phone]

## 2024-11-12 RX ORDER — CITALOPRAM HYDROBROMIDE 10 MG/1
10 TABLET ORAL DAILY
Qty: 90 TABLET | Refills: 1 | Status: SHIPPED | OUTPATIENT
Start: 2024-11-12

## 2024-12-11 RX ORDER — ACYCLOVIR 400 MG/1
TABLET ORAL
Qty: 90 TABLET | Refills: 3 | Status: SHIPPED | OUTPATIENT
Start: 2024-12-11

## 2024-12-12 DIAGNOSIS — E55.9 VITAMIN D DEFICIENCY: ICD-10-CM

## 2024-12-12 DIAGNOSIS — I10 PRIMARY HYPERTENSION: Primary | ICD-10-CM

## 2024-12-12 DIAGNOSIS — E53.8 B12 DEFICIENCY: ICD-10-CM

## 2024-12-12 DIAGNOSIS — E78.5 HYPERLIPIDEMIA, UNSPECIFIED HYPERLIPIDEMIA TYPE: ICD-10-CM

## 2024-12-18 LAB
25(OH)D3+25(OH)D2 SERPL-MCNC: 41.5 NG/ML (ref 30–100)
ALBUMIN SERPL-MCNC: 4.1 G/DL (ref 3.6–4.6)
ALP SERPL-CCNC: 76 IU/L (ref 44–121)
ALT SERPL-CCNC: 14 IU/L (ref 0–32)
APPEARANCE UR: CLEAR
AST SERPL-CCNC: 21 IU/L (ref 0–40)
BACTERIA #/AREA URNS HPF: NORMAL /[HPF]
BASOPHILS # BLD AUTO: 0 X10E3/UL (ref 0–0.2)
BASOPHILS NFR BLD AUTO: 1 %
BILIRUB SERPL-MCNC: 0.3 MG/DL (ref 0–1.2)
BILIRUB UR QL STRIP: NEGATIVE
BUN SERPL-MCNC: 14 MG/DL (ref 10–36)
BUN/CREAT SERPL: 20 (ref 12–28)
CALCIUM SERPL-MCNC: 9 MG/DL (ref 8.7–10.3)
CASTS URNS QL MICRO: NORMAL /LPF
CHLORIDE SERPL-SCNC: 99 MMOL/L (ref 96–106)
CHOLEST SERPL-MCNC: 156 MG/DL (ref 100–199)
CO2 SERPL-SCNC: 26 MMOL/L (ref 20–29)
COLOR UR: YELLOW
CREAT SERPL-MCNC: 0.7 MG/DL (ref 0.57–1)
EGFRCR SERPLBLD CKD-EPI 2021: 79 ML/MIN/1.73
EOSINOPHIL # BLD AUTO: 0.1 X10E3/UL (ref 0–0.4)
EOSINOPHIL NFR BLD AUTO: 3 %
EPI CELLS #/AREA URNS HPF: NORMAL /HPF (ref 0–10)
ERYTHROCYTE [DISTWIDTH] IN BLOOD BY AUTOMATED COUNT: 12.8 % (ref 11.7–15.4)
GLOBULIN SER CALC-MCNC: 2.6 G/DL (ref 1.5–4.5)
GLUCOSE SERPL-MCNC: 88 MG/DL (ref 70–99)
GLUCOSE UR QL STRIP: NEGATIVE
HCT VFR BLD AUTO: 36.8 % (ref 34–46.6)
HDLC SERPL-MCNC: 57 MG/DL
HGB BLD-MCNC: 11.7 G/DL (ref 11.1–15.9)
HGB UR QL STRIP: NEGATIVE
IMM GRANULOCYTES # BLD AUTO: 0 X10E3/UL (ref 0–0.1)
IMM GRANULOCYTES NFR BLD AUTO: 0 %
KETONES UR QL STRIP: NEGATIVE
LDLC SERPL CALC-MCNC: 80 MG/DL (ref 0–99)
LEUKOCYTE ESTERASE UR QL STRIP: NEGATIVE
LYMPHOCYTES # BLD AUTO: 1.1 X10E3/UL (ref 0.7–3.1)
LYMPHOCYTES NFR BLD AUTO: 27 %
MCH RBC QN AUTO: 31.5 PG (ref 26.6–33)
MCHC RBC AUTO-ENTMCNC: 31.8 G/DL (ref 31.5–35.7)
MCV RBC AUTO: 99 FL (ref 79–97)
MICRO URNS: NORMAL
MICRO URNS: NORMAL
MONOCYTES # BLD AUTO: 0.3 X10E3/UL (ref 0.1–0.9)
MONOCYTES NFR BLD AUTO: 8 %
NEUTROPHILS # BLD AUTO: 2.4 X10E3/UL (ref 1.4–7)
NEUTROPHILS NFR BLD AUTO: 61 %
NITRITE UR QL STRIP: NEGATIVE
NTI URINE TUBE (GRAY): NORMAL
PH UR STRIP: 5.5 [PH] (ref 5–7.5)
PLATELET # BLD AUTO: 219 X10E3/UL (ref 150–450)
POTASSIUM SERPL-SCNC: 4.7 MMOL/L (ref 3.5–5.2)
PROT SERPL-MCNC: 6.7 G/DL (ref 6–8.5)
PROT UR QL STRIP: NEGATIVE
RBC # BLD AUTO: 3.71 X10E6/UL (ref 3.77–5.28)
RBC #/AREA URNS HPF: NORMAL /HPF (ref 0–2)
SODIUM SERPL-SCNC: 138 MMOL/L (ref 134–144)
SP GR UR STRIP: 1.02 (ref 1–1.03)
T4 FREE SERPL-MCNC: 1.05 NG/DL (ref 0.82–1.77)
TRIGL SERPL-MCNC: 106 MG/DL (ref 0–149)
TSH SERPL DL<=0.005 MIU/L-ACNC: 6.57 UIU/ML (ref 0.45–4.5)
UROBILINOGEN UR STRIP-MCNC: 0.2 MG/DL (ref 0.2–1)
VIT B12 SERPL-MCNC: 534 PG/ML (ref 232–1245)
VLDLC SERPL CALC-MCNC: 19 MG/DL (ref 5–40)
WBC # BLD AUTO: 4 X10E3/UL (ref 3.4–10.8)
WBC #/AREA URNS HPF: NORMAL /HPF (ref 0–5)

## 2024-12-20 ENCOUNTER — OFFICE VISIT (OUTPATIENT)
Dept: INTERNAL MEDICINE | Facility: CLINIC | Age: 89
End: 2024-12-20
Payer: OTHER GOVERNMENT

## 2024-12-20 VITALS
BODY MASS INDEX: 22.33 KG/M2 | SYSTOLIC BLOOD PRESSURE: 130 MMHG | HEART RATE: 82 BPM | WEIGHT: 134 LBS | OXYGEN SATURATION: 98 % | DIASTOLIC BLOOD PRESSURE: 72 MMHG | HEIGHT: 65 IN

## 2024-12-20 DIAGNOSIS — Z00.00 MEDICARE ANNUAL WELLNESS VISIT, SUBSEQUENT: Primary | ICD-10-CM

## 2024-12-20 DIAGNOSIS — E78.5 HYPERLIPIDEMIA, UNSPECIFIED HYPERLIPIDEMIA TYPE: ICD-10-CM

## 2024-12-20 DIAGNOSIS — M81.8 OTHER OSTEOPOROSIS WITHOUT CURRENT PATHOLOGICAL FRACTURE: ICD-10-CM

## 2024-12-20 DIAGNOSIS — I10 PRIMARY HYPERTENSION: ICD-10-CM

## 2024-12-20 DIAGNOSIS — F32.A CHRONIC DEPRESSION: ICD-10-CM

## 2024-12-20 PROBLEM — K92.2 ACUTE LOWER GI BLEEDING: Status: RESOLVED | Noted: 2024-01-10 | Resolved: 2024-12-20

## 2024-12-20 PROBLEM — S06.5XAA SUBDURAL HEMATOMA: Status: RESOLVED | Noted: 2024-05-06 | Resolved: 2024-12-20

## 2024-12-20 RX ORDER — HYDROCHLOROTHIAZIDE 12.5 MG/1
12.5 TABLET ORAL DAILY
Qty: 90 TABLET | Refills: 1 | Status: SHIPPED | OUTPATIENT
Start: 2024-12-20

## 2024-12-20 NOTE — PROGRESS NOTES
Subjective   The ABCs of the Annual Wellness Visit  Medicare Wellness Visit      Cristela Peralta is a 89 y.o. patient who presents for a Medicare Wellness Visit.    The following portions of the patient's history were reviewed and   updated as appropriate: allergies, current medications, past family history, past medical history, past social history, past surgical history, and problem list.    Compared to one year ago, the patient's physical   health is the same.  Compared to one year ago, the patient's mental   health is the same.    Recent Hospitalizations:  This patient has had a [facility] admission record on file within the last 365 days.  Current Medical Providers:  Patient Care Team:  Arelis Lara MD as PCP - Mili Frazier as Referring Physician (Physical Therapy)    Outpatient Medications Prior to Visit   Medication Sig Dispense Refill    acetaminophen (TYLENOL) 500 MG tablet Take 1 tablet by mouth Every 6 (Six) Hours As Needed for Mild Pain.      Ascorbic Acid 500 MG chewable tablet Chew 500 mg Take As Directed.      ASPIRIN 81 PO Take 81 mg by mouth Take As Directed. Aspirin Oral Tablet Delayed Release 81 MG      celecoxib (CeleBREX) 200 MG capsule Take 1 capsule by mouth Daily. Celecoxib Oral Capsule 200 MG 90 capsule 3    cetirizine (zyrTEC) 10 MG tablet Take 1 tablet by mouth Take As Directed.      citalopram (CeleXA) 10 MG tablet Take 1 tablet by mouth Daily. 90 tablet 1    Coenzyme Q10 (CoQ-10) 100 MG capsule Take 1 capsule by mouth Daily.      cyanocobalamin 1000 MCG/ML injection Inject 1 mL under the skin into the appropriate area as directed Every 28 (Twenty-Eight) Days. 3 mL 3    hydroCHLOROthiazide 12.5 MG tablet Take 1 tablet by mouth Daily. 90 tablet 1    multivitamin with minerals (CENTRUM SILVER PO) Take 1 tablet by mouth Daily.      pantoprazole (PROTONIX) 40 MG EC tablet Take 1 tablet by mouth 2 (Two) Times a Day. 180 tablet 3    rosuvastatin (Crestor) 20 MG tablet  "Take 1 tablet by mouth Daily. 90 tablet 3    Sennosides (SENNA LAXATIVE PO) Take  by mouth As Needed.      acyclovir (ZOVIRAX) 400 MG tablet TAKE 1 TABLET BY MOUTH 3 TIMES A DAY AS NEEDED (FOR FIVE DAYS OF INFECTION). TAKE NO MORE THAN 5 DOSES A DAY. (Patient not taking: Reported on 12/20/2024) 90 tablet 3    Syringe/Needle, Disp, (BD SafetyGlide Syringe/Needle) 27G X 5/8\" 1 ML misc Use 1 each Every 30 (Thirty) Days. 3 each 3     No facility-administered medications prior to visit.     No opioid medication identified on active medication list. I have reviewed chart for other potential  high risk medication/s and harmful drug interactions in the elderly.      Aspirin is on active medication list. Aspirin use is indicated based on review of current medical condition/s. Pros and cons of this therapy have been discussed today. Benefits of this medication outweigh potential harm.  Patient has been encouraged to continue taking this medication.  .      Patient Active Problem List   Diagnosis    Diastolic dysfunction    Hyperlipidemia    Hypertension    Chronic low back pain    Osteoarthritis    Other osteoporosis without current pathological fracture    Vitamin D deficiency    Chronic depression    Claudication    Stenosis of right carotid artery    B12 deficiency     Advance Care Planning Advance Directive is on file.  ACP discussion was held with the patient during this visit. Patient has an advance directive in EMR which is still valid.             Objective   Vitals:    12/20/24 1331   BP: 130/72   Pulse: 82   SpO2: 98%   Weight: 60.8 kg (134 lb)   Height: 165.1 cm (65\")   PainSc: 0-No pain       Estimated body mass index is 22.3 kg/m² as calculated from the following:    Height as of this encounter: 165.1 cm (65\").    Weight as of this encounter: 60.8 kg (134 lb).    BMI is within normal parameters. No other follow-up for BMI required.           Does the patient have evidence of cognitive impairment? No  Lab Results "   Component Value Date    CHLPL 156 2024    TRIG 106 2024    HDL 57 2024    LDL 80 2024    VLDL 19 2024                                                                                                Health  Risk Assessment    Smoking Status:  Social History     Tobacco Use   Smoking Status Never    Passive exposure: Past   Smokeless Tobacco Never   Tobacco Comments    Patient does not smoke     Alcohol Consumption:  Social History     Substance and Sexual Activity   Alcohol Use No    Comment: Patient is non drinker.       Fall Risk Screen  STEADI Fall Risk Assessment was completed, and patient is at LOW risk for falls.Assessment completed on:2024    Depression Screening   Little interest or pleasure in doing things? Not at all   Feeling down, depressed, or hopeless? Not at all   PHQ-2 Total Score 0      Health Habits and Functional and Cognitive Screenin/20/2024     1:31 PM   Functional & Cognitive Status   Do you have difficulty preparing food and eating? No   Do you have difficulty bathing yourself, getting dressed or grooming yourself? No   Do you have difficulty using the toilet? No   Do you have difficulty moving around from place to place? No   Do you have trouble with steps or getting out of a bed or a chair? No   Current Diet Well Balanced Diet   Dental Exam Up to date   Eye Exam Up to date   Exercise (times per week) 0 times per week   Current Exercises Include No Regular Exercise   Do you need help using the phone?  No   Are you deaf or do you have serious difficulty hearing?  No   Do you need help to go to places out of walking distance? No   Do you need help shopping? No   Do you need help preparing meals?  No   Do you need help with housework?  No   Do you need help with laundry? No   Do you need help taking your medications? No   Do you need help managing money? No   Do you ever drive or ride in a car without wearing a seat belt? No   Have you felt unusual  stress, anger or loneliness in the last month? No   Who do you live with? Alone   If you need help, do you have trouble finding someone available to you? No   Have you been bothered in the last four weeks by sexual problems? No   Do you have difficulty concentrating, remembering or making decisions? No           Age-appropriate Screening Schedule:  Refer to the list below for future screening recommendations based on patient's age, sex and/or medical conditions. Orders for these recommended tests are listed in the plan section. The patient has been provided with a written plan.    Health Maintenance List  Health Maintenance   Topic Date Due    TDAP/TD VACCINES (2 - Tdap) 01/01/2017    ANNUAL WELLNESS VISIT  12/15/2024    DXA SCAN  03/08/2025    LIPID PANEL  12/17/2025    COVID-19 Vaccine  Completed    RSV Vaccine - Adults  Completed    INFLUENZA VACCINE  Completed    Pneumococcal Vaccine 65+  Completed    ZOSTER VACCINE  Completed    MAMMOGRAM  Discontinued                                                                                                                                                CMS Preventative Services Quick Reference  Risk Factors Identified During Encounter  None Identified    The above risks/problems have been discussed with the patient.  Pertinent information has been shared with the patient in the After Visit Summary.  An After Visit Summary and PPPS were made available to the patient.    Follow Up:   Next Medicare Wellness visit to be scheduled in 1 year.         Additional E&M Note during same encounter follows:  Patient has additional, significant, and separately identifiable condition(s)/problem(s) that require work above and beyond the Medicare Wellness Visit     Chief Complaint  Medicare Wellness-subsequent    Subjective   HPI  [place] is also being seen today for additional medical problem/s.    HTN.  Blood pressure is under good control.  HLD.  LDL cholesterol is under good  "control.    Chronic depression.  Feels well on Celexa.  OP.  Maintained on Prolia.        Review of Systems   Respiratory: Negative.     Cardiovascular: Negative.               Objective   Vital Signs:  /72   Pulse 82   Ht 165.1 cm (65\")   Wt 60.8 kg (134 lb)   SpO2 98%   BMI 22.30 kg/m²   Physical Exam  Constitutional:       Appearance: She is well-developed.   HENT:      Head: Normocephalic and atraumatic.      Right Ear: Hearing and tympanic membrane normal.      Left Ear: Hearing and tympanic membrane normal.      Mouth/Throat:      Pharynx: No oropharyngeal exudate or posterior oropharyngeal erythema.   Cardiovascular:      Rate and Rhythm: Normal rate and regular rhythm.      Heart sounds: Normal heart sounds.   Pulmonary:      Effort: Pulmonary effort is normal.      Breath sounds: Normal breath sounds.   Skin:     General: Skin is warm and dry.   Neurological:      Mental Status: She is alert and oriented to person, place, and time.   Psychiatric:         Behavior: Behavior normal.         The following data was reviewed by: Arelis Lara MD on 12/20/2024:    Common labs          6/11/2024    10:56 8/5/2024    13:33 12/17/2024    10:18   Common Labs   Glucose 95  102  88    BUN 19  18  14    Creatinine 0.72  0.74  0.70    Sodium 138  135  138    Potassium 4.4  4.4  4.7    Chloride 101  100  99    Calcium 9.0  9.0  9.0    Total Protein 6.6   6.7    Albumin 4.4  3.9  4.1    Total Bilirubin 0.4  0.3  0.3    Alkaline Phosphatase 79  89  76    AST (SGOT) 20  19  21    ALT (SGPT) 9  10  14    WBC 3.51   4.0    Hemoglobin 11.2   11.7    Hematocrit 34.3   36.8    Platelets 210   219    Total Cholesterol 158   156    Triglycerides 98   106    HDL Cholesterol 57   57    LDL Cholesterol  83   80              Assessment and Plan Additional age appropriate preventative wellness advice topics were discussed during today's preventative wellness exam(some topics already addressed during AWV portion of the " note above):    Physical Activity: Advised cardiovascular activity 150 minutes per week as tolerated. (example brisk walk for 30 minutes, 5 days a week).           Medicare annual wellness visit, subsequent         Primary hypertension           Hyperlipidemia, unspecified hyperlipidemia type            Chronic depression           Other osteoporosis without current pathological fracture    Orders:    DEXA Bone Density Axial; Future     HTN.  Control is good.  The patient is advised to continue current dosage of HCTZ.  HLD.  LDL is good.  Optimize with increase of Crestor to 20 mg.    Chronic depression.  Control is good.  The patient is advised to continue current dosage of Celexa.    OP.  I recommend to get 1200 mg of calcium and 1000 IUs of vitamin D through diet and supplements and to participate in a weight based exercise to prevent loss of bone mineral density. Bone mineral will be monitored every two years.  Continue Prolia.       Reviewed/updated 12/20/2024.    Follow Up   No follow-ups on file.  Patient was given instructions and counseling regarding her condition or for health maintenance advice. Please see specific information pulled into the AVS if appropriate.

## 2024-12-20 NOTE — PATIENT INSTRUCTIONS
Medicare Wellness  Personal Prevention Plan of Service     Date of Office Visit:    Encounter Provider:  Arelis Lara MD  Place of Service:  [facility]  Patient Name: Cristela Peralta  :  1928    As part of the Medicare Wellness portion of your visit today, we are providing you with this personalized preventive plan of services (PPPS). This plan is based upon recommendations of the [place] Preventive Services Task Force (USPSTF) and the Advisory Committee on Immunization Practices (ACIP).    This lists the preventive care services that should be considered, and provides dates of when you are due. Items listed as completed are up-to-date and do not require any further intervention.    Health Maintenance   Topic Date Due    TDAP/TD VACCINES (2 - Tdap) 2017    ANNUAL WELLNESS VISIT  12/15/2024    DXA SCAN  2025    LIPID PANEL  2025    COVID-19 Vaccine  Completed    RSV Vaccine - Adults  Completed    INFLUENZA VACCINE  Completed    Pneumococcal Vaccine 65+  Completed    ZOSTER VACCINE  Completed    MAMMOGRAM  Discontinued       No orders of the defined types were placed in this encounter.      No follow-ups on file.

## 2025-02-03 DIAGNOSIS — M81.8 OTHER OSTEOPOROSIS WITHOUT CURRENT PATHOLOGICAL FRACTURE: Primary | ICD-10-CM

## 2025-02-06 ENCOUNTER — LAB (OUTPATIENT)
Dept: OTHER | Facility: HOSPITAL | Age: OVER 89
End: 2025-02-06
Payer: MEDICARE

## 2025-02-06 ENCOUNTER — INFUSION (OUTPATIENT)
Dept: ONCOLOGY | Facility: HOSPITAL | Age: OVER 89
End: 2025-02-06
Payer: MEDICARE

## 2025-02-06 DIAGNOSIS — M81.8 OTHER OSTEOPOROSIS WITHOUT CURRENT PATHOLOGICAL FRACTURE: ICD-10-CM

## 2025-02-06 DIAGNOSIS — M81.8 OTHER OSTEOPOROSIS WITHOUT CURRENT PATHOLOGICAL FRACTURE: Primary | ICD-10-CM

## 2025-02-06 LAB
25(OH)D3 SERPL-MCNC: 32.3 NG/ML (ref 30–100)
ALBUMIN SERPL-MCNC: 4 G/DL (ref 3.5–5.2)
ALBUMIN/GLOB SERPL: 1.2 G/DL
ALP SERPL-CCNC: 82 U/L (ref 39–117)
ALT SERPL W P-5'-P-CCNC: 8 U/L (ref 1–33)
ANION GAP SERPL CALCULATED.3IONS-SCNC: 8.8 MMOL/L (ref 5–15)
AST SERPL-CCNC: 21 U/L (ref 1–32)
BILIRUB SERPL-MCNC: 0.3 MG/DL (ref 0–1.2)
BUN SERPL-MCNC: 15 MG/DL (ref 8–23)
BUN/CREAT SERPL: 16.7 (ref 7–25)
CALCIUM SPEC-SCNC: 9.2 MG/DL (ref 8.2–9.6)
CHLORIDE SERPL-SCNC: 98 MMOL/L (ref 98–107)
CO2 SERPL-SCNC: 28.2 MMOL/L (ref 22–29)
CREAT SERPL-MCNC: 0.9 MG/DL (ref 0.57–1)
EGFRCR SERPLBLD CKD-EPI 2021: 58.6 ML/MIN/1.73
GLOBULIN UR ELPH-MCNC: 3.3 GM/DL
GLUCOSE SERPL-MCNC: 88 MG/DL (ref 65–99)
MAGNESIUM SERPL-MCNC: 2.2 MG/DL (ref 1.7–2.3)
PHOSPHATE SERPL-MCNC: 4.3 MG/DL (ref 2.5–4.5)
POTASSIUM SERPL-SCNC: 4.5 MMOL/L (ref 3.5–5.2)
PROT SERPL-MCNC: 7.3 G/DL (ref 6–8.5)
SODIUM SERPL-SCNC: 135 MMOL/L (ref 136–145)

## 2025-02-06 PROCEDURE — 84100 ASSAY OF PHOSPHORUS: CPT | Performed by: INTERNAL MEDICINE

## 2025-02-06 PROCEDURE — 82306 VITAMIN D 25 HYDROXY: CPT | Performed by: INTERNAL MEDICINE

## 2025-02-06 PROCEDURE — 25010000002 DENOSUMAB 60 MG/ML SOLUTION PREFILLED SYRINGE: Performed by: INTERNAL MEDICINE

## 2025-02-06 PROCEDURE — 83735 ASSAY OF MAGNESIUM: CPT | Performed by: INTERNAL MEDICINE

## 2025-02-06 PROCEDURE — 96372 THER/PROPH/DIAG INJ SC/IM: CPT

## 2025-02-06 PROCEDURE — 80053 COMPREHEN METABOLIC PANEL: CPT | Performed by: INTERNAL MEDICINE

## 2025-02-06 RX ADMIN — DENOSUMAB 60 MG: 60 INJECTION SUBCUTANEOUS at 14:28

## 2025-03-14 RX ORDER — ACYCLOVIR 400 MG/1
TABLET ORAL
Qty: 90 TABLET | Refills: 3 | Status: SHIPPED | OUTPATIENT
Start: 2025-03-14

## 2025-03-17 DIAGNOSIS — R42 VERTIGO: Primary | ICD-10-CM

## 2025-03-17 RX ORDER — MECLIZINE HYDROCHLORIDE 25 MG/1
25 TABLET ORAL 3 TIMES DAILY PRN
Qty: 30 TABLET | Refills: 0 | Status: SHIPPED | OUTPATIENT
Start: 2025-03-17

## 2025-03-19 RX ORDER — CYANOCOBALAMIN 1000 UG/ML
INJECTION, SOLUTION INTRAMUSCULAR; SUBCUTANEOUS
Qty: 3 ML | Refills: 3 | Status: SHIPPED | OUTPATIENT
Start: 2025-03-19

## 2025-03-31 ENCOUNTER — TELEPHONE (OUTPATIENT)
Dept: INTERNAL MEDICINE | Facility: CLINIC | Age: OVER 89
End: 2025-03-31

## 2025-03-31 ENCOUNTER — OFFICE VISIT (OUTPATIENT)
Dept: INTERNAL MEDICINE | Facility: CLINIC | Age: OVER 89
End: 2025-03-31
Payer: MEDICARE

## 2025-03-31 VITALS
OXYGEN SATURATION: 98 % | DIASTOLIC BLOOD PRESSURE: 84 MMHG | WEIGHT: 130 LBS | BODY MASS INDEX: 21.66 KG/M2 | HEART RATE: 82 BPM | SYSTOLIC BLOOD PRESSURE: 122 MMHG | HEIGHT: 65 IN

## 2025-03-31 DIAGNOSIS — U07.1 COVID-19 VIRUS DETECTED: Primary | ICD-10-CM

## 2025-03-31 DIAGNOSIS — Z78.0 MENOPAUSE: ICD-10-CM

## 2025-03-31 DIAGNOSIS — M81.8 OTHER OSTEOPOROSIS WITHOUT CURRENT PATHOLOGICAL FRACTURE: Primary | ICD-10-CM

## 2025-03-31 PROCEDURE — 1160F RVW MEDS BY RX/DR IN RCRD: CPT | Performed by: INTERNAL MEDICINE

## 2025-03-31 PROCEDURE — 99213 OFFICE O/P EST LOW 20 MIN: CPT | Performed by: INTERNAL MEDICINE

## 2025-03-31 PROCEDURE — 1159F MED LIST DOCD IN RCRD: CPT | Performed by: INTERNAL MEDICINE

## 2025-03-31 PROCEDURE — 1126F AMNT PAIN NOTED NONE PRSNT: CPT | Performed by: INTERNAL MEDICINE

## 2025-03-31 NOTE — TELEPHONE ENCOUNTER
Caller: Buffy Dorsey    Relationship to patient: Emergency Contact    Best call back number: [phone]     Patient is needing: PATIENT'S DAUGHTER IS REQUESTING IF ONCE THIS UPDATED PRESCRIPTION HAS BEEN SENT OVER CAN SOMEONE PLEASE CALL AND INFORM THEM.

## 2025-03-31 NOTE — PROGRESS NOTES
Subjective     Cristela Peralta is a 89 y.o. female who presents with   Chief Complaint   Patient presents with    Cough    Nasal Congestion    Fatigue    Dizziness       Cough  Associated symptoms include a fever. Pertinent negatives include no chills.   Fatigue  Symptoms include cough, fatigue and a fever.    Pertinent negative symptoms include no chills.   Dizziness  Symptoms include cough, fatigue and a fever.    Pertinent negative symptoms include no chills.        URI symptoms started on Thursday.  Cough and congestion.  Aches.  Fatigue and general weakness.      Review of Systems   Constitutional:  Positive for fatigue and fever. Negative for chills.   Respiratory:  Positive for cough.    Neurological:  Positive for dizziness.       The following portions of the patient's history were reviewed and updated as appropriate: allergies, current medications and problem list.    Patient Active Problem List    Diagnosis Date Noted    B12 deficiency 01/24/2024    Stenosis of right carotid artery 12/16/2023     Note Last Updated: 6/14/2024     Chief Complaint: Carotid artery stenosis  Arterial Evaluation:Left leg. Right leg.  The patient does not complain of cramping pain with physical activity.  The patient does not complain of rest pain.  The patient does not complain of cutaneous ulcers.  Cerebral Vascular Evaluation:Doppler ultrasound from 11/2/2023 shows percentage of stenosis by doppler: right 70% left <50%. The patient denies weakness, facial drooping, sudden vision loss, headache, difficulty with  speech, syncope, dizziness, difficulty swallowing, and unsteady gait/balance.        Claudication 06/24/2019     Note Last Updated: 6/24/2019     By [employer] screen      Chronic depression 10/24/2018    Vitamin D deficiency 10/14/2016    Diastolic dysfunction 05/13/2016    Hyperlipidemia 05/13/2016    Hypertension 05/13/2016    Chronic low back pain 05/13/2016    Osteoarthritis 05/13/2016    Other osteoporosis without  "current pathological fracture 05/13/2016     Note Last Updated: 6/13/2023     S/p five years of bisphosphonates.  Prolia started in 2023/.           Current Outpatient Medications on File Prior to Visit   Medication Sig Dispense Refill    acetaminophen (TYLENOL) 500 MG tablet Take 1 tablet by mouth Every 6 (Six) Hours As Needed for Mild Pain.      acyclovir (ZOVIRAX) 400 MG tablet TAKE 1 TABLET BY MOUTH 3 TIMES A DAY AS NEEDED (FOR FIVE DAYS OF INFECTION). TAKE NO MORE THAN 5 DOSES A DAY. 90 tablet 3    Ascorbic Acid 500 MG chewable tablet Chew 500 mg Take As Directed.      ASPIRIN 81 PO Take 81 mg by mouth Take As Directed. Aspirin Oral Tablet Delayed Release 81 MG      celecoxib (CeleBREX) 200 MG capsule Take 1 capsule by mouth Daily. Celecoxib Oral Capsule 200 MG 90 capsule 3    cetirizine (zyrTEC) 10 MG tablet Take 1 tablet by mouth Take As Directed.      citalopram (CeleXA) 10 MG tablet Take 1 tablet by mouth Daily. 90 tablet 1    Coenzyme Q10 (CoQ-10) 100 MG capsule Take 1 capsule by mouth Daily.      cyanocobalamin 1000 MCG/ML injection INJECT 1 ML UNDER THE SKIN INTO THE APPROPRIATE AREA EVERY 28 DAYS AS DIRECTED 3 mL 3    fluticasone (FLONASE) 50 MCG/ACT nasal spray Administer 1 spray into the nostril(s) as directed by provider 2 (Two) Times a Day. For the runny nose 9.9 g 0    hydroCHLOROthiazide 12.5 MG tablet TAKE 1 TABLET BY MOUTH DAILY. 90 tablet 1    meclizine (ANTIVERT) 25 MG tablet Take 1 tablet by mouth 3 (Three) Times a Day As Needed for Dizziness. 30 tablet 0    multivitamin with minerals (CENTRUM SILVER PO) Take 1 tablet by mouth Daily.      pantoprazole (PROTONIX) 40 MG EC tablet Take 1 tablet by mouth 2 (Two) Times a Day. 180 tablet 3    rosuvastatin (Crestor) 20 MG tablet Take 1 tablet by mouth Daily. 90 tablet 3    Sennosides (SENNA LAXATIVE PO) Take  by mouth As Needed.      Tuberculin-Allergy Syringes 25G X 5/8\" 1 ML misc Use 1 dose Every 30 (Thirty) Days. 25 each 3    amoxicillin (AMOXIL) " "875 MG tablet Take 1 tablet by mouth 2 (Two) Times a Day. 14 tablet 0    dextromethorphan 15 MG/5ML syrup Take 5 mL by mouth 4 (Four) Times a Day As Needed for Cough. 118 mL 0     No current facility-administered medications on file prior to visit.       Objective     /84   Pulse 82   Ht 165.1 cm (65\")   Wt 59 kg (130 lb)   LMP  (LMP Unknown) Comment: TOTAL  SpO2 98%   BMI 21.63 kg/m²     Physical Exam  Constitutional:       Appearance: She is well-developed.   HENT:      Head: Normocephalic and atraumatic.      Right Ear: Hearing and tympanic membrane normal.      Left Ear: Hearing and tympanic membrane normal.      Mouth/Throat:      Pharynx: No oropharyngeal exudate or posterior oropharyngeal erythema.   Cardiovascular:      Rate and Rhythm: Normal rate and regular rhythm.      Heart sounds: Normal heart sounds.      Systolic murmur is present with a grade of 3/6.   Pulmonary:      Effort: Pulmonary effort is normal.      Breath sounds: Normal breath sounds.   Skin:     General: Skin is warm and dry.   Neurological:      Mental Status: She is alert and oriented to person, place, and time.   Psychiatric:         Behavior: Behavior normal.         Assessment & Plan   Diagnoses and all orders for this visit:    1. COVID-19 virus detected (Primary)    Other orders  -     Nirmatrelvir & Ritonavir, 300mg/100mg, (PAXLOVID); Take 2 tablets by mouth 2 (Two) Times a Day.  Dispense: 30 tablet; Refill: 0        Discussion    I recommend attention to rest and fluids. I recommend as needed tylenol for fevers and aches. Paxlovid is an FDA approved for emergency use.  Important considerations include numerous drug interactions.  Symptoms could rebound in some after a course of this medication according to recent reports.   The CDC's new guidance now matches public health advice for flu and other respiratory illnesses: Stay home when you're sick, but return to school or work once you're feeling better and you've been " without a fever for 24 hours.  Continue to wear a mask and take precautions for a total of five days.    Reasons to go to the ER include severe trouble breathing, chest pain, confusion, inability to wake or stay awake, and bluish lips or face.  Continue supportive measures and let me know if there is any change in symptoms.         Future Appointments   Date Time Provider Department Center   6/23/2025 11:10 AM [facility]   6/30/2025  2:30 PM Arelis Lara MD [facility]   8/8/2025  3:30 PM REFERRED INJECTION CHAIR EP  [facility]   12/23/2025 11:20 AM [facility]   12/31/2025  2:15 PM Arelis Lara MD [facility]

## 2025-03-31 NOTE — TELEPHONE ENCOUNTER
Pharmacy Name: [facility] [id number] [place], [place] - [address] AT [facility] [phone] [facility] [phone]        PHARMACY NUMBER  [id number]     What medication are you calling in regards to:     THE PAXLOVID  RX THAT CALLED IN , IS FOR  THE STANDARD DIRECTIONS,     BUT THE INSTRUCTIONS WE GAVE ARE FOR RENAL IMPAIRMENT     Authorized By: Arelis Lara MD

## 2025-04-22 RX ORDER — PANTOPRAZOLE SODIUM 40 MG/1
40 TABLET, DELAYED RELEASE ORAL 2 TIMES DAILY
Qty: 180 TABLET | Refills: 3 | Status: SHIPPED | OUTPATIENT
Start: 2025-04-22

## 2025-05-05 RX ORDER — ROSUVASTATIN CALCIUM 20 MG/1
20 TABLET, COATED ORAL DAILY
Qty: 90 TABLET | Refills: 3 | Status: SHIPPED | OUTPATIENT
Start: 2025-05-05

## 2025-05-29 ENCOUNTER — HOSPITAL ENCOUNTER (OUTPATIENT)
Dept: BONE DENSITY | Facility: HOSPITAL | Age: OVER 89
Discharge: HOME | End: 2025-05-29
Admitting: INTERNAL MEDICINE
Payer: MEDICARE

## 2025-05-29 DIAGNOSIS — M81.8 OTHER OSTEOPOROSIS WITHOUT CURRENT PATHOLOGICAL FRACTURE: ICD-10-CM

## 2025-05-29 DIAGNOSIS — Z78.0 MENOPAUSE: ICD-10-CM

## 2025-05-29 PROCEDURE — 77080 DXA BONE DENSITY AXIAL: CPT

## 2025-05-29 RX ORDER — HYDROCHLOROTHIAZIDE 12.5 MG/1
12.5 TABLET ORAL DAILY
Qty: 90 TABLET | Refills: 1 | Status: SHIPPED | OUTPATIENT
Start: 2025-05-29

## 2025-05-29 RX ORDER — CELECOXIB 200 MG/1
200 CAPSULE ORAL DAILY
Qty: 90 CAPSULE | Refills: 3 | Status: SHIPPED | OUTPATIENT
Start: 2025-05-29

## 2025-06-23 DIAGNOSIS — E78.5 HYPERLIPIDEMIA, UNSPECIFIED HYPERLIPIDEMIA TYPE: ICD-10-CM

## 2025-06-23 DIAGNOSIS — I10 PRIMARY HYPERTENSION: Primary | ICD-10-CM

## 2025-06-23 LAB
ALBUMIN SERPL-MCNC: 4.3 G/DL (ref 3.5–5.2)
ALBUMIN/GLOB SERPL: 2 G/DL
ALP SERPL-CCNC: 73 U/L (ref 39–117)
ALT SERPL-CCNC: 9 U/L (ref 1–33)
AST SERPL-CCNC: 18 U/L (ref 1–32)
BASOPHILS # BLD AUTO: 0.02 10*3/MM3 (ref 0–0.2)
BASOPHILS NFR BLD AUTO: 0.5 % (ref 0–1.5)
BILIRUB SERPL-MCNC: 0.4 MG/DL (ref 0–1.2)
BUN SERPL-MCNC: 14 MG/DL (ref 8–23)
BUN/CREAT SERPL: 19.4 (ref 7–25)
CALCIUM SERPL-MCNC: 9.6 MG/DL (ref 8.2–9.6)
CHLORIDE SERPL-SCNC: 97 MMOL/L (ref 98–107)
CHOLEST SERPL-MCNC: 167 MG/DL (ref 0–200)
CO2 SERPL-SCNC: 25.7 MMOL/L (ref 22–29)
CREAT SERPL-MCNC: 0.72 MG/DL (ref 0.57–1)
EGFRCR SERPLBLD CKD-EPI 2021: 76.2 ML/MIN/1.73
EOSINOPHIL # BLD AUTO: 0.07 10*3/MM3 (ref 0–0.4)
EOSINOPHIL NFR BLD AUTO: 1.9 % (ref 0.3–6.2)
ERYTHROCYTE [DISTWIDTH] IN BLOOD BY AUTOMATED COUNT: 12.5 % (ref 12.3–15.4)
GLOBULIN SER CALC-MCNC: 2.1 GM/DL
GLUCOSE SERPL-MCNC: 98 MG/DL (ref 65–99)
HCT VFR BLD AUTO: 37.3 % (ref 34–46.6)
HDLC SERPL-MCNC: 58 MG/DL (ref 40–60)
HGB BLD-MCNC: 12.5 G/DL (ref 12–15.9)
IMM GRANULOCYTES # BLD AUTO: 0.01 10*3/MM3 (ref 0–0.05)
IMM GRANULOCYTES NFR BLD AUTO: 0.3 % (ref 0–0.5)
LDLC SERPL CALC-MCNC: 89 MG/DL (ref 0–100)
LYMPHOCYTES # BLD AUTO: 1.01 10*3/MM3 (ref 0.7–3.1)
LYMPHOCYTES NFR BLD AUTO: 27.2 % (ref 19.6–45.3)
MCH RBC QN AUTO: 33.1 PG (ref 26.6–33)
MCHC RBC AUTO-ENTMCNC: 33.5 G/DL (ref 31.5–35.7)
MCV RBC AUTO: 98.7 FL (ref 79–97)
MONOCYTES # BLD AUTO: 0.37 10*3/MM3 (ref 0.1–0.9)
MONOCYTES NFR BLD AUTO: 9.9 % (ref 5–12)
NEUTROPHILS # BLD AUTO: 2.24 10*3/MM3 (ref 1.7–7)
NEUTROPHILS NFR BLD AUTO: 60.2 % (ref 42.7–76)
NRBC BLD AUTO-RTO: 0 /100 WBC (ref 0–0.2)
PLATELET # BLD AUTO: 203 10*3/MM3 (ref 140–450)
POTASSIUM SERPL-SCNC: 4.7 MMOL/L (ref 3.5–5.2)
PROT SERPL-MCNC: 6.4 G/DL (ref 6–8.5)
RBC # BLD AUTO: 3.78 10*6/MM3 (ref 3.77–5.28)
SODIUM SERPL-SCNC: 137 MMOL/L (ref 136–145)
TRIGL SERPL-MCNC: 109 MG/DL (ref 0–150)
VLDLC SERPL CALC-MCNC: 20 MG/DL (ref 5–40)
WBC # BLD AUTO: 3.72 10*3/MM3 (ref 3.4–10.8)

## 2025-06-30 ENCOUNTER — OFFICE VISIT (OUTPATIENT)
Dept: INTERNAL MEDICINE | Facility: CLINIC | Age: OVER 89
End: 2025-06-30
Payer: MEDICARE

## 2025-06-30 VITALS
OXYGEN SATURATION: 98 % | HEART RATE: 79 BPM | DIASTOLIC BLOOD PRESSURE: 80 MMHG | BODY MASS INDEX: 22.16 KG/M2 | HEIGHT: 65 IN | WEIGHT: 133 LBS | SYSTOLIC BLOOD PRESSURE: 138 MMHG

## 2025-06-30 DIAGNOSIS — I10 PRIMARY HYPERTENSION: Primary | ICD-10-CM

## 2025-06-30 DIAGNOSIS — E78.5 HYPERLIPIDEMIA, UNSPECIFIED HYPERLIPIDEMIA TYPE: ICD-10-CM

## 2025-06-30 DIAGNOSIS — R42 DIZZINESS: ICD-10-CM

## 2025-06-30 PROBLEM — I73.9 PVD (PERIPHERAL VASCULAR DISEASE): Status: ACTIVE | Noted: 2019-06-24

## 2025-06-30 PROBLEM — I73.9 CLAUDICATION: Status: RESOLVED | Noted: 2019-06-24 | Resolved: 2025-06-30

## 2025-06-30 PROCEDURE — 90684 PCV21 VACCINE IM: CPT | Performed by: INTERNAL MEDICINE

## 2025-06-30 PROCEDURE — 1160F RVW MEDS BY RX/DR IN RCRD: CPT | Performed by: INTERNAL MEDICINE

## 2025-06-30 PROCEDURE — 1159F MED LIST DOCD IN RCRD: CPT | Performed by: INTERNAL MEDICINE

## 2025-06-30 PROCEDURE — 1126F AMNT PAIN NOTED NONE PRSNT: CPT | Performed by: INTERNAL MEDICINE

## 2025-06-30 PROCEDURE — G0009 ADMIN PNEUMOCOCCAL VACCINE: HCPCS | Performed by: INTERNAL MEDICINE

## 2025-06-30 PROCEDURE — 99214 OFFICE O/P EST MOD 30 MIN: CPT | Performed by: INTERNAL MEDICINE

## 2025-06-30 NOTE — PROGRESS NOTES
Subjective     Cristela Peralta is a 89 y.o. female who presents with   Chief Complaint   Patient presents with    Hypertension    Hyperlipidemia       History of Present Illness     The following data was reviewed by: Arelis Lara MD on 06/30/2025:  Common labs          12/17/2024    10:18 2/6/2025    13:23 6/23/2025    11:11   Common Labs   Glucose 88  88  98    BUN 14  15  14.0    Creatinine 0.70  0.90  0.72    Sodium 138  135  137    Potassium 4.7  4.5  4.7    Chloride 99  98  97    Calcium 9.0  9.2  9.6    Albumin 4.1  4.0  4.3    Total Bilirubin 0.3  0.3  0.4    Alkaline Phosphatase 76  82  73    AST (SGOT) 21  21  18    ALT (SGPT) 14  8  9    WBC 4.0   3.72    Hemoglobin 11.7   12.5    Hematocrit 36.8   37.3    Platelets 219   203    Total Cholesterol 156   167    Triglycerides 106   109    HDL Cholesterol 57   58    LDL Cholesterol  80   89      HTN.  Blood pressure is under good control.  HLD.  LDL cholesterol is under good control.      Occasional dizziness.  Only with standing quickly.  No syncope.      Review of Systems   Respiratory:  Negative for chest tightness and shortness of breath.    Cardiovascular:  Negative for chest pain.   Neurological:  Positive for dizziness and weakness.       The following portions of the patient's history were reviewed and updated as appropriate: allergies, current medications and problem list.    Patient Active Problem List    Diagnosis Date Noted    B12 deficiency 01/24/2024    Stenosis of right carotid artery 12/16/2023     Note Last Updated: 6/14/2024     Chief Complaint: Carotid artery stenosis  Arterial Evaluation:Left leg. Right leg.  The patient does not complain of cramping pain with physical activity.  The patient does not complain of rest pain.  The patient does not complain of cutaneous ulcers.  Cerebral Vascular Evaluation:Doppler ultrasound from 11/2/2023 shows percentage of stenosis by doppler: right 70% left <50%. The patient denies weakness, facial  drooping, sudden vision loss, headache, difficulty with  speech, syncope, dizziness, difficulty swallowing, and unsteady gait/balance.        PVD (peripheral vascular disease) 06/24/2019    Chronic depression 10/24/2018    Vitamin D deficiency 10/14/2016    Diastolic dysfunction 05/13/2016    Hyperlipidemia 05/13/2016    Hypertension 05/13/2016    Chronic low back pain 05/13/2016    Osteoarthritis 05/13/2016    Other osteoporosis without current pathological fracture 05/13/2016     Note Last Updated: 6/13/2023     S/p five years of bisphosphonates.  Prolia started in 2023/.           Current Outpatient Medications on File Prior to Visit   Medication Sig Dispense Refill    acetaminophen (TYLENOL) 500 MG tablet Take 1 tablet by mouth Every 6 (Six) Hours As Needed for Mild Pain.      acyclovir (ZOVIRAX) 400 MG tablet TAKE 1 TABLET BY MOUTH 3 TIMES A DAY AS NEEDED (FOR FIVE DAYS OF INFECTION). TAKE NO MORE THAN 5 DOSES A DAY. 90 tablet 3    Ascorbic Acid 500 MG chewable tablet Chew 500 mg Take As Directed.      ASPIRIN 81 PO Take 81 mg by mouth Take As Directed. Aspirin Oral Tablet Delayed Release 81 MG      celecoxib (CeleBREX) 200 MG capsule TAKE 1 CAPSULE BY MOUTH DAILY. 90 capsule 3    cetirizine (zyrTEC) 10 MG tablet Take 1 tablet by mouth Take As Directed.      Coenzyme Q10 (CoQ-10) 100 MG capsule Take 1 capsule by mouth Daily.      cyanocobalamin 1000 MCG/ML injection INJECT 1 ML UNDER THE SKIN INTO THE APPROPRIATE AREA EVERY 28 DAYS AS DIRECTED 3 mL 3    fluticasone (FLONASE) 50 MCG/ACT nasal spray Administer 1 spray into the nostril(s) as directed by provider 2 (Two) Times a Day. For the runny nose 9.9 g 0    hydroCHLOROthiazide 12.5 MG tablet TAKE ONE TABLET BY MOUTH EVERY DAY 90 tablet 1    meclizine (ANTIVERT) 25 MG tablet Take 1 tablet by mouth 3 (Three) Times a Day As Needed for Dizziness. 30 tablet 0    multivitamin with minerals (CENTRUM SILVER PO) Take 1 tablet by mouth Daily.      pantoprazole  "(PROTONIX) 40 MG EC tablet TAKE ONE TABLET BY MOUTH TWICE A  tablet 3    rosuvastatin (CRESTOR) 20 MG tablet TAKE ONE TABLET BY MOUTH EVERY DAY 90 tablet 3    Sennosides (SENNA LAXATIVE PO) Take  by mouth As Needed.      Tuberculin-Allergy Syringes 25G X 5/8\" 1 ML misc Use 1 dose Every 30 (Thirty) Days. 25 each 3    [DISCONTINUED] citalopram (CeleXA) 10 MG tablet Take 1 tablet by mouth Daily. 90 tablet 1     No current facility-administered medications on file prior to visit.       Objective     /80   Pulse 79   Ht 165.1 cm (65\")   Wt 60.3 kg (133 lb)   LMP  (LMP Unknown) Comment: TOTAL  SpO2 98%   BMI 22.13 kg/m²     Physical Exam  Constitutional:       Appearance: She is well-developed.   HENT:      Head: Normocephalic and atraumatic.   Cardiovascular:      Rate and Rhythm: Normal rate and regular rhythm.      Heart sounds: Murmur heard.      Systolic murmur is present with a grade of 3/6.   Pulmonary:      Effort: Pulmonary effort is normal.      Breath sounds: Normal breath sounds.   Skin:     General: Skin is warm and dry.   Neurological:      Mental Status: She is alert and oriented to person, place, and time.   Psychiatric:         Behavior: Behavior normal.         Assessment & Plan   Diagnoses and all orders for this visit:    1. Primary hypertension (Primary)    2. Hyperlipidemia, unspecified hyperlipidemia type    3. Dizziness    Other orders  -     Pneumococcal Conjugate Vaccine 21 (18+ yrs)        Discussion    HTN.  Control is good.  The patient is advised to continue current dosage of HCTZ.   HLD. Control is good.  The patient is advised to continue current dosage of Crestor.  Dizziness.  Most consistent with orthostatic hypotension.  She will increase attention to fluids.  She is not a good water drinker.  Offered work up with echo (to evaluate murmur as well).   She and her daughter decline at this time.         Future Appointments   Date Time Provider Department Center   8/8/2025 "  3:30 PM REFERRED INJECTION CHAIR [facility]   12/23/2025 11:20 AM [facility]   12/31/2025  2:15 PM Arelis Lara MD [facility]

## 2025-07-01 RX ORDER — CITALOPRAM HYDROBROMIDE 10 MG/1
10 TABLET ORAL DAILY
Qty: 90 TABLET | Refills: 1 | Status: SHIPPED | OUTPATIENT
Start: 2025-07-01

## 2025-07-16 ENCOUNTER — APPOINTMENT (OUTPATIENT)
Dept: CT IMAGING | Facility: HOSPITAL | Age: OVER 89
End: 2025-07-16
Payer: MEDICARE

## 2025-07-16 ENCOUNTER — TELEPHONE (OUTPATIENT)
Dept: INTERNAL MEDICINE | Facility: CLINIC | Age: OVER 89
End: 2025-07-16
Payer: MEDICARE

## 2025-07-16 ENCOUNTER — HOSPITAL ENCOUNTER (EMERGENCY)
Facility: HOSPITAL | Age: OVER 89
Discharge: HOME | End: 2025-07-16
Attending: EMERGENCY MEDICINE | Admitting: EMERGENCY MEDICINE
Payer: MEDICARE

## 2025-07-16 ENCOUNTER — APPOINTMENT (OUTPATIENT)
Dept: GENERAL RADIOLOGY | Facility: HOSPITAL | Age: OVER 89
End: 2025-07-16
Payer: MEDICARE

## 2025-07-16 VITALS
SYSTOLIC BLOOD PRESSURE: 177 MMHG | OXYGEN SATURATION: 97 % | WEIGHT: 132.94 LBS | TEMPERATURE: 98.1 F | DIASTOLIC BLOOD PRESSURE: 76 MMHG | BODY MASS INDEX: 22.15 KG/M2 | HEIGHT: 65 IN | HEART RATE: 74 BPM | RESPIRATION RATE: 17 BRPM

## 2025-07-16 DIAGNOSIS — I10 ESSENTIAL HYPERTENSION: ICD-10-CM

## 2025-07-16 DIAGNOSIS — R01.1 CARDIAC MURMUR: ICD-10-CM

## 2025-07-16 DIAGNOSIS — R42 DIZZINESS: Primary | ICD-10-CM

## 2025-07-16 LAB
ALBUMIN SERPL-MCNC: 4.3 G/DL (ref 3.5–5.2)
ALBUMIN/GLOB SERPL: 1.9 G/DL
ALP SERPL-CCNC: 71 U/L (ref 39–117)
ALT SERPL W P-5'-P-CCNC: 8 U/L (ref 1–33)
ANION GAP SERPL CALCULATED.3IONS-SCNC: 7.7 MMOL/L (ref 5–15)
AST SERPL-CCNC: 17 U/L (ref 1–32)
BASOPHILS # BLD AUTO: 0.02 10*3/MM3 (ref 0–0.2)
BASOPHILS NFR BLD AUTO: 0.6 % (ref 0–1.5)
BILIRUB SERPL-MCNC: 0.4 MG/DL (ref 0–1.2)
BILIRUB UR QL STRIP: NEGATIVE
BUN SERPL-MCNC: 11.9 MG/DL (ref 8–23)
BUN/CREAT SERPL: 18.3 (ref 7–25)
CALCIUM SPEC-SCNC: 10 MG/DL (ref 8.2–9.6)
CHLORIDE SERPL-SCNC: 95 MMOL/L (ref 98–107)
CLARITY UR: CLEAR
CO2 SERPL-SCNC: 29.3 MMOL/L (ref 22–29)
COLOR UR: NORMAL
CREAT SERPL-MCNC: 0.65 MG/DL (ref 0.57–1)
DEPRECATED RDW RBC AUTO: 48.4 FL (ref 37–54)
EGFRCR SERPLBLD CKD-EPI 2021: 80.2 ML/MIN/1.73
EOSINOPHIL # BLD AUTO: 0.07 10*3/MM3 (ref 0–0.4)
EOSINOPHIL NFR BLD AUTO: 2 % (ref 0.3–6.2)
ERYTHROCYTE [DISTWIDTH] IN BLOOD BY AUTOMATED COUNT: 12.8 % (ref 12.3–15.4)
GEN 5 1HR TROPONIN T REFLEX: 12 NG/L
GLOBULIN UR ELPH-MCNC: 2.3 GM/DL
GLUCOSE SERPL-MCNC: 95 MG/DL (ref 65–99)
GLUCOSE UR STRIP-MCNC: NEGATIVE MG/DL
HCT VFR BLD AUTO: 36.9 % (ref 34–46.6)
HGB BLD-MCNC: 11.7 G/DL (ref 12–15.9)
HGB UR QL STRIP.AUTO: NEGATIVE
HOLD SPECIMEN: NORMAL
HOLD SPECIMEN: NORMAL
IMM GRANULOCYTES # BLD AUTO: 0 10*3/MM3 (ref 0–0.05)
IMM GRANULOCYTES NFR BLD AUTO: 0 % (ref 0–0.5)
KETONES UR QL STRIP: NEGATIVE
LEUKOCYTE ESTERASE UR QL STRIP.AUTO: NEGATIVE
LYMPHOCYTES # BLD AUTO: 1.26 10*3/MM3 (ref 0.7–3.1)
LYMPHOCYTES NFR BLD AUTO: 35.2 % (ref 19.6–45.3)
MAGNESIUM SERPL-MCNC: 2.1 MG/DL (ref 1.7–2.3)
MCH RBC QN AUTO: 32.1 PG (ref 26.6–33)
MCHC RBC AUTO-ENTMCNC: 31.7 G/DL (ref 31.5–35.7)
MCV RBC AUTO: 101.1 FL (ref 79–97)
MONOCYTES # BLD AUTO: 0.39 10*3/MM3 (ref 0.1–0.9)
MONOCYTES NFR BLD AUTO: 10.9 % (ref 5–12)
NEUTROPHILS NFR BLD AUTO: 1.84 10*3/MM3 (ref 1.7–7)
NEUTROPHILS NFR BLD AUTO: 51.3 % (ref 42.7–76)
NITRITE UR QL STRIP: NEGATIVE
PH UR STRIP.AUTO: 6 [PH] (ref 5–8)
PLATELET # BLD AUTO: 184 10*3/MM3 (ref 140–450)
PMV BLD AUTO: 8.9 FL (ref 6–12)
POTASSIUM SERPL-SCNC: 4.3 MMOL/L (ref 3.5–5.2)
PROT SERPL-MCNC: 6.6 G/DL (ref 6–8.5)
PROT UR QL STRIP: NEGATIVE
QT INTERVAL: 395 MS
QTC INTERVAL: 433 MS
RBC # BLD AUTO: 3.65 10*6/MM3 (ref 3.77–5.28)
SODIUM SERPL-SCNC: 132 MMOL/L (ref 136–145)
SP GR UR STRIP: 1.01 (ref 1–1.03)
TROPONIN T % DELTA: -14
TROPONIN T NUMERIC DELTA: -2 NG/L
TROPONIN T SERPL HS-MCNC: 14 NG/L
UROBILINOGEN UR QL STRIP: NORMAL
WBC NRBC COR # BLD AUTO: 3.58 10*3/MM3 (ref 3.4–10.8)
WHOLE BLOOD HOLD COAG: NORMAL
WHOLE BLOOD HOLD SPECIMEN: NORMAL

## 2025-07-16 PROCEDURE — 84484 ASSAY OF TROPONIN QUANT: CPT | Performed by: EMERGENCY MEDICINE

## 2025-07-16 PROCEDURE — 83735 ASSAY OF MAGNESIUM: CPT

## 2025-07-16 PROCEDURE — 93010 ELECTROCARDIOGRAM REPORT: CPT | Performed by: INTERNAL MEDICINE

## 2025-07-16 PROCEDURE — 71045 X-RAY EXAM CHEST 1 VIEW: CPT

## 2025-07-16 PROCEDURE — 36415 COLL VENOUS BLD VENIPUNCTURE: CPT

## 2025-07-16 PROCEDURE — 70450 CT HEAD/BRAIN W/O DYE: CPT

## 2025-07-16 PROCEDURE — 80053 COMPREHEN METABOLIC PANEL: CPT

## 2025-07-16 PROCEDURE — 84484 ASSAY OF TROPONIN QUANT: CPT

## 2025-07-16 PROCEDURE — 93005 ELECTROCARDIOGRAM TRACING: CPT | Performed by: EMERGENCY MEDICINE

## 2025-07-16 PROCEDURE — 81003 URINALYSIS AUTO W/O SCOPE: CPT | Performed by: EMERGENCY MEDICINE

## 2025-07-16 PROCEDURE — 85025 COMPLETE CBC W/AUTO DIFF WBC: CPT

## 2025-07-16 PROCEDURE — 99284 EMERGENCY DEPT VISIT MOD MDM: CPT

## 2025-07-16 PROCEDURE — 99283 EMERGENCY DEPT VISIT LOW MDM: CPT | Performed by: EMERGENCY MEDICINE

## 2025-07-16 RX ORDER — SODIUM CHLORIDE 0.9 % (FLUSH) 0.9 %
10 SYRINGE (ML) INJECTION AS NEEDED
Status: DISCONTINUED | OUTPATIENT
Start: 2025-07-16 | End: 2025-07-16 | Stop reason: HOSPADM

## 2025-07-16 NOTE — TELEPHONE ENCOUNTER
Patient's daughter called Asmita called and stated that patient is feeling dizzy and blood pressure is running higher than normal. It is 190/67    She was wondering if the high blood pressure can cause the dizziness and would like to speak with someone clinical.    Best call back # [phone]

## 2025-07-16 NOTE — ED TRIAGE NOTES
"Pt via PV, escorted by family, with c/o \"intermittent dizziness for a while, elevated BP x1 week or so,\" per family.   "

## 2025-07-16 NOTE — TELEPHONE ENCOUNTER
Hub staff attempted to follow warm transfer process and was unsuccessful     Caller: Asmita Hutchison    Relationship to patient: Emergency Contact    Best call back number: [phone]     Patient is needing: REQUESTING A CALLBACK. SHE IS INQUIRING IF THEY ARE AWARE OF HER COMING INTO URGENT CARE.SHE WANTS TO KNOW IF THEY'RE READY WHEN SHE'S COMING.

## 2025-07-16 NOTE — FSED PROVIDER NOTE
Subjective   History of Present Illness  89yo female pmh significant htn/hyperlipidemia/carotid stenosis presents ED c/o 2wk hx orthostatic dizziness exacerbated with upright posture/ambulation.  ROS neg headache/fever/diplopia/dysarthria/dysphagia/syncope/palpitations/chest pain/abd pain/paresthesia/motor weakness/vertigo/otalgia.    History provided by:  Patient  Dizziness  Associated symptoms: no weakness        Review of Systems   Constitutional: Negative.    HENT: Negative.     Eyes: Negative.    Respiratory: Negative.     Cardiovascular: Negative.    Gastrointestinal: Negative.    Genitourinary: Negative.    Neurological:  Positive for dizziness. Negative for syncope, speech difficulty, weakness and numbness.   All other systems reviewed and are negative.      Past Medical History:   Diagnosis Date    Acute lower GI bleeding 01/10/2024    Allergic     Sulfa drugs    Anemia     Anxiety     After death of spouse    Atherosclerosis of native arteries of extremities with intermittent claudication, bilateral legs 04/20/2023    PVD    Bilateral carotid artery stenosis 05/01/2023    Carotid bruit 04/20/2023    Cataract     Chronic low back pain 05/13/2016    Clotting disorder     2020 and 2024    Degeneration of cervical intervertebral disc     Depression     Diastolic dysfunction 05/13/2016    Diverticulosis     Essential (primary) hypertension 05/01/2023    GI bleed     H/O bone density study 12/11/2015    Heart murmur May 2024    This was new with latest hospitalization    History of depression     History of diastolic dysfunction     History of diverticulosis     History of EKG 04/29/2015    History of herpes genitalis     History of mammogram 12/11/2015    History of medical problems     PVD, talia    History of spinal stenosis     History of transfusion 1974    History of vertigo     HL (hearing loss)     Hyperlipidemia     Hyperlipidemia 05/13/2016    Hyperlipidemia, unspecified 04/20/2023    Hypertension      Hypertension 2016    Mild single current episode of major depressive disorder 10/24/2018    Osteoarthritis     Osteoarthritis 2016    Osteopenia     Osteopenia 2016    Osteoporosis     Peripheral vascular disease, unspecified 2023    PVD (peripheral vascular disease) 2023    PVD (peripheral vascular disease) 2019    Rectal bleeding     Subdural hematoma 2024    Visual impairment     Vitamin D deficiency 10/14/2016       Allergies   Allergen Reactions    Neomycin-Bacitracin Zn-Polymyx Rash    Atorvastatin Other (See Comments)     LEG CRAMPS    Pravastatin Other (See Comments)     LEG CRAMPS    Alendronate GI Intolerance    Sulfa Antibiotics Hives and Rash       Past Surgical History:   Procedure Laterality Date    APPENDECTOMY      COLONOSCOPY N/A 2020    Procedure: COLONOSCOPY;  Surgeon: Tung Muir MD;  Location: [facility] ENDOSCOPY;  Service: Gastroenterology    ENDOSCOPY N/A 2020    Procedure: ESOPHAGOGASTRODUODENOSCOPY;  Surgeon: Yanet Obregon MD;  Location: [facility] ENDOSCOPY;  Service: Gastroenterology    EYE SURGERY      Desima replacement    HYSTERECTOMY      TOTAL    JOINT REPLACEMENT      KNEE SURGERY      Replacement    TOTAL ABDOMINAL HYSTERECTOMY WITH SALPINGO OOPHORECTOMY         Family History   Problem Relation Age of Onset    Aneurysm Father     Heart disease Father         Multiple MI    Arthritis Father     Colon cancer Sister     Throat cancer Sister     Anxiety disorder Sister     Arthritis Sister         RA and osteoarthritis    Thyroid disease Sister     Anxiety disorder Sister     Arthritis Sister     Heart disease Sister     Cancer Sister         Kidney ca    Cancer Brother          colon ca    Heart disease Daughter     Aortic aneurysm Daughter         abdominal    Stroke Daughter     Heart disease Daughter         Valve repair cabg    Thyroid disease Daughter     Thyroid disease Sister     Cancer Brother          Kidney       Social History     Socioeconomic History    Marital status:    Tobacco Use    Smoking status: Never     Passive exposure: Past    Smokeless tobacco: Never    Tobacco comments:     Patient does not smoke   Vaping Use    Vaping status: Never Used   Substance and Sexual Activity    Alcohol use: Never     Comment: Patient is non drinker.    Drug use: Never     Comment: Drug Abuse: Not a drug user    Sexual activity: Not Currently     Partners: Male     Birth control/protection: Post-menopausal           Objective   Physical Exam  Vitals and nursing note reviewed.   Constitutional:       Appearance: Normal appearance. She is not ill-appearing, toxic-appearing or diaphoretic.   HENT:      Head: Normocephalic and atraumatic.      Right Ear: Tympanic membrane, ear canal and external ear normal.      Left Ear: Tympanic membrane, ear canal and external ear normal.      Nose: Nose normal.      Mouth/Throat:      Mouth: Mucous membranes are moist.      Pharynx: Oropharynx is clear.   Eyes:      Extraocular Movements: Extraocular movements intact.      Pupils: Pupils are equal, round, and reactive to light.   Cardiovascular:      Rate and Rhythm: Normal rate and regular rhythm.      Heart sounds: S1 normal and S2 normal. Murmur heard.      Systolic murmur is present with a grade of 3/6.      No friction rub. No gallop.   Pulmonary:      Effort: Pulmonary effort is normal.      Breath sounds: Normal breath sounds. No wheezing, rhonchi or rales.   Abdominal:      General: Abdomen is flat. Bowel sounds are normal. There is no distension.      Palpations: Abdomen is soft.      Tenderness: There is no abdominal tenderness. There is no guarding or rebound.   Musculoskeletal:         General: No swelling, tenderness, deformity or signs of injury.      Cervical back: Normal range of motion and neck supple. No rigidity.      Right lower leg: No edema.      Left lower leg: No edema.   Lymphadenopathy:      Cervical: No  cervical adenopathy.   Skin:     General: Skin is warm and dry.   Neurological:      General: No focal deficit present.      Mental Status: She is alert and oriented to person, place, and time.      GCS: GCS eye subscore is 4. GCS verbal subscore is 5. GCS motor subscore is 6.      Cranial Nerves: Cranial nerves 2-12 are intact.      Sensory: Sensation is intact.      Motor: Motor function is intact.      Coordination: Coordination is intact. Finger-Nose-Finger Test normal.      Gait: Gait is intact.         ECG 12 Lead      Date/Time: 7/16/2025 4:37 PM    Performed by: Bandar Peter MD  Authorized by: Bandar Peter MD  Interpreted by ED physician  Rhythm: sinus rhythm  Rate: normal  BPM: 72  QRS axis: normal  Conduction: conduction normal  ST Segments: ST segments normal  T depression: aVL  Q waves: V1 and V2  Clinical impression: non-specific ECG               ED Course      Labs Reviewed   COMPREHENSIVE METABOLIC PANEL - Abnormal; Notable for the following components:       Result Value    Sodium 132 (*)     Chloride 95 (*)     CO2 29.3 (*)     Calcium 10.0 (*)     All other components within normal limits    Narrative:     GFR Categories in Chronic Kidney Disease (CKD)              GFR Category          GFR (mL/min/1.73)    Interpretation  G1                    90 or greater        Normal or high (1)  G2                    60-89                Mild decrease (1)  G3a                   45-59                Mild to moderate decrease  G3b                   30-44                Moderate to severe decrease  G4                    15-29                Severe decrease  G5                    14 or less           Kidney failure    (1)In the absence of evidence of kidney disease, neither GFR category G1 or G2 fulfill the criteria for CKD.    eGFR calculation 2021 CKD-EPI creatinine equation, which does not include race as a factor   TROPONIN - Abnormal; Notable for the following components:    HS Troponin T 14 (*)      All other components within normal limits    Narrative:     High Sensitive Troponin T Reference Range:  <14.0 ng/L- Negative Female for AMI  <22.0 ng/L- Negative Male for AMI  >=14 - Abnormal Female indicating possible myocardial injury.  >=22 - Abnormal Male indicating possible myocardial injury.   Clinicians would have to utilize clinical acumen, EKG, Troponin, and serial changes to determine if it is an Acute Myocardial Infarction or myocardial injury due to an underlying chronic condition.        CBC WITH AUTO DIFFERENTIAL - Abnormal; Notable for the following components:    RBC 3.65 (*)     Hemoglobin 11.7 (*)     .1 (*)     All other components within normal limits   MAGNESIUM - Normal   URINALYSIS W/ MICROSCOPIC IF INDICATED (NO CULTURE) - Normal    Narrative:     Urine microscopic not indicated.   RAINBOW DRAW    Narrative:     The following orders were created for panel order [place] Draw.  Procedure                               Abnormality         Status                     ---------                               -----------         ------                     Green Top (Gel)[[id number]]                                  Final result               Lavender Top[[id number]]                                     Final result               Gold Top - SST[[id number]]                                   Final result               Light Blue Top[[id number]]                                   Final result               Green Top (Gel)[[id number]]                                                               Please view results for these tests on the individual orders.   HIGH SENSITIVITIY TROPONIN T 1HR    Narrative:     High Sensitive Troponin T Reference Range:  <14.0 ng/L- Negative Female for AMI  <22.0 ng/L- Negative Male for AMI  >=14 - Abnormal Female indicating possible myocardial injury.  >=22 - Abnormal Male indicating possible myocardial injury.   Clinicians would have to utilize clinical acumen, EKG,  Troponin, and serial changes to determine if it is an Acute Myocardial Infarction or myocardial injury due to an underlying chronic condition.        POCT GLUCOSE FINGERSTICK   CBC AND DIFFERENTIAL    Narrative:     The following orders were created for panel order CBC & Differential.  Procedure                               Abnormality         Status                     ---------                               -----------         ------                     CBC Auto Differential[[id number]]        Abnormal            Final result                 Please view results for these tests on the individual orders.   GREEN TOP   LAVENDER TOP   GOLD TOP - SST   LIGHT BLUE TOP     CT Head Without Contrast  Result Date: 7/16/2025  Narrative: HEAD CT WITHOUT CONTRAST  REASON:  dizziness; R42-Dizziness and giddiness; R01.1-Cardiac murmur, unspecified  COMPARISON STUDIES: Head CT 6/4/2024.  TECHNIQUE:  Axial images were acquired from the skull base to vertex without contrast, including multiplanar reformats, per standard departmental protocol.    Radiation dose reduction techniques were utilized, including automated exposure control, and exposure modulation based on body size.  FINDINGS:  There is no CT evidence of acute intracranial hemorrhage, mass, or infarct. There is volume loss, but there is no evidence of hydrocephalus or extra-axial fluid collection.  Redemonstrated moderate nonspecific white matter change and chronic right basal ganglier lacunar lesion. No evidence of acute intracranial abnormality.  Skull base, calvarium, and extracranial soft tissues show no acute abnormality.      Impression:  Chronic changes as noted above, no acute intracranial abnormality.        This report was finalized on 7/16/2025 6:16 PM by Dr. Fabian Cuevas M.D on Workstation: [id number]      XR Chest 1 View  Result Date: 7/16/2025  Narrative: ONE-VIEW PORTABLE CHEST  HISTORY: 89-year-old female. Weakness and dizziness.  FINDINGS: The lungs  are well expanded and clear except for a tiny calcified granuloma at the left base. The heart and hilar structures are within normal limits and there is no acute disease.  This report was finalized on 7/16/2025 2:42 PM by Dr. Maynor Robertson M.D on Workstation: [employer]                                           Medical Decision Making  Labs/radiographic findings reviewed.  CXR: no active disease.  CT head: negative.  EKG: SR/rate 72/normal axis/normal qtc/qv1-v2.  CBC/CMP: mild hyponatremia; no significant abnormality.  hsTnT: neg x2.  UA: normal.  Orthostatics: negative.  Normal physical examination.  Neuro intact. GCS 15. CN2-12 intact. Normal gait. No cerebellar findings.  Pt with orthostatic dizziness x2 weeks duration.  Systolic ejection murmur noted on exam.  Recommend compression stockings/increase PO fluid intake/cardiology followup.  Ambulatory referral cardiology placed in [facility].  Strict return precautions.    Problems Addressed:  Cardiac murmur: complicated acute illness or injury  Dizziness: complicated acute illness or injury  Essential hypertension: complicated acute illness or injury    Amount and/or Complexity of Data Reviewed  Labs: ordered.  Radiology: ordered.  ECG/medicine tests: ordered and independent interpretation performed.    Risk  Prescription drug management.        Final diagnoses:   Dizziness   Cardiac murmur   Essential hypertension       ED Disposition  ED Disposition       ED Disposition   Discharge    Condition   Good    Comment   --               Arelis Lara MD  [address]  [facility] [address]  [phone]    In 1 day      Itzel Nj MD  [address]  [place] [facility] [address]  [phone]    In 1 day      [facility] CARDIOLOGY  [address] [place] [address]  [facility] [id number] [phone]  Schedule an appointment as soon as possible for a visit            Medication List      No changes were made to your prescriptions  during this visit.

## 2025-07-16 NOTE — DISCHARGE INSTRUCTIONS
Increase PO fluid intake  Wear compression stockings as directed  Change positions slowly when standing  Return ED chest pain, shortness of air, syncope, palpitations, worse condition, any other concerns

## 2025-08-06 ENCOUNTER — TELEPHONE (OUTPATIENT)
Dept: INTERNAL MEDICINE | Facility: CLINIC | Age: OVER 89
End: 2025-08-06
Payer: MEDICARE

## 2025-08-08 ENCOUNTER — INFUSION (OUTPATIENT)
Dept: ONCOLOGY | Facility: HOSPITAL | Age: OVER 89
End: 2025-08-08
Payer: MEDICARE

## 2025-08-08 VITALS — TEMPERATURE: 98.4 F

## 2025-08-08 DIAGNOSIS — M81.8 OTHER OSTEOPOROSIS WITHOUT CURRENT PATHOLOGICAL FRACTURE: Primary | ICD-10-CM

## 2025-08-08 PROCEDURE — 25010000002 DENOSUMAB 60 MG/ML SOLUTION PREFILLED SYRINGE: Performed by: INTERNAL MEDICINE

## 2025-08-08 PROCEDURE — 96372 THER/PROPH/DIAG INJ SC/IM: CPT

## 2025-08-08 RX ADMIN — DENOSUMAB 60 MG: 60 INJECTION SUBCUTANEOUS at 15:02

## 2025-08-13 ENCOUNTER — OFFICE VISIT (OUTPATIENT)
Dept: CARDIOLOGY | Facility: CLINIC | Age: OVER 89
End: 2025-08-13
Payer: MEDICARE

## 2025-08-13 VITALS
OXYGEN SATURATION: 96 % | BODY MASS INDEX: 22.16 KG/M2 | DIASTOLIC BLOOD PRESSURE: 80 MMHG | WEIGHT: 133 LBS | SYSTOLIC BLOOD PRESSURE: 130 MMHG | HEART RATE: 75 BPM | HEIGHT: 65 IN

## 2025-08-13 DIAGNOSIS — I73.9 PVD (PERIPHERAL VASCULAR DISEASE): ICD-10-CM

## 2025-08-13 DIAGNOSIS — I10 ESSENTIAL HYPERTENSION: ICD-10-CM

## 2025-08-13 DIAGNOSIS — I35.0 AORTIC STENOSIS, SEVERE: Primary | ICD-10-CM

## 2025-08-13 DIAGNOSIS — E78.00 HYPERCHOLESTEROLEMIA: ICD-10-CM

## 2025-08-13 PROCEDURE — 99204 OFFICE O/P NEW MOD 45 MIN: CPT | Performed by: INTERNAL MEDICINE

## 2025-08-14 ENCOUNTER — HOSPITAL ENCOUNTER (OUTPATIENT)
Dept: CARDIOLOGY | Facility: HOSPITAL | Age: OVER 89
Discharge: HOME | End: 2025-08-14
Admitting: INTERNAL MEDICINE
Payer: MEDICARE

## 2025-08-14 VITALS
DIASTOLIC BLOOD PRESSURE: 68 MMHG | BODY MASS INDEX: 22.16 KG/M2 | HEIGHT: 65 IN | SYSTOLIC BLOOD PRESSURE: 140 MMHG | WEIGHT: 133 LBS | HEART RATE: 75 BPM

## 2025-08-14 DIAGNOSIS — I35.0 AORTIC STENOSIS, SEVERE: ICD-10-CM

## 2025-08-14 LAB
AORTIC ARCH: 2.2 CM
AORTIC DIMENSIONLESS INDEX: 0.27 (DI)
ASCENDING AORTA: 2.6 CM
AV MEAN PRESS GRAD SYS DOP V1V2: 23.4 MMHG
AV VMAX SYS DOP: 308.4 CM/SEC
BH CV ECHO LEFT ATRIAL STRAIN: 11.7 %
BH CV ECHO LEFT VENTRICLE GLOBAL LONGITUDINAL STRAIN: -22.6 %
BH CV ECHO MEAS - ACS: 1.31 CM
BH CV ECHO MEAS - AO MAX PG: 38 MMHG
BH CV ECHO MEAS - AO ROOT DIAM: 3.3 CM
BH CV ECHO MEAS - AO V2 VTI: 74.2 CM
BH CV ECHO MEAS - AVA(I,D): 0.76 CM2
BH CV ECHO MEAS - EDV(CUBED): 50.7 ML
BH CV ECHO MEAS - EDV(MOD-SP2): 76 ML
BH CV ECHO MEAS - EDV(MOD-SP4): 72 ML
BH CV ECHO MEAS - EF(MOD-SP2): 64.5 %
BH CV ECHO MEAS - EF(MOD-SP4): 59.7 %
BH CV ECHO MEAS - ESV(CUBED): 15.8 ML
BH CV ECHO MEAS - ESV(MOD-SP2): 27 ML
BH CV ECHO MEAS - ESV(MOD-SP4): 29 ML
BH CV ECHO MEAS - FS: 32.2 %
BH CV ECHO MEAS - IVS/LVPW: 1.06 CM
BH CV ECHO MEAS - IVSD: 1.9 CM
BH CV ECHO MEAS - LAT PEAK E' VEL: 7.7 CM/SEC
BH CV ECHO MEAS - LV DIASTOLIC VOL/BSA (35-75): 43.3 CM2
BH CV ECHO MEAS - LV MASS(C)D: 295.6 GRAMS
BH CV ECHO MEAS - LV MAX PG: 2.6 MMHG
BH CV ECHO MEAS - LV MEAN PG: 1.86 MMHG
BH CV ECHO MEAS - LV SYSTOLIC VOL/BSA (12-30): 17.4 CM2
BH CV ECHO MEAS - LV V1 MAX: 81 CM/SEC
BH CV ECHO MEAS - LV V1 VTI: 20.2 CM
BH CV ECHO MEAS - LVIDD: 3.7 CM
BH CV ECHO MEAS - LVIDS: 2.5 CM
BH CV ECHO MEAS - LVOT AREA: 2.8 CM2
BH CV ECHO MEAS - LVOT DIAM: 1.89 CM
BH CV ECHO MEAS - LVPWD: 1.8 CM
BH CV ECHO MEAS - MED PEAK E' VEL: 7.5 CM/SEC
BH CV ECHO MEAS - MR MAX PG: 127.2 MMHG
BH CV ECHO MEAS - MR MAX VEL: 563.9 CM/SEC
BH CV ECHO MEAS - MV A DUR: 0.12 SEC
BH CV ECHO MEAS - MV A MAX VEL: 95.1 CM/SEC
BH CV ECHO MEAS - MV DEC SLOPE: 717.6 CM/SEC2
BH CV ECHO MEAS - MV DEC TIME: 0.21 SEC
BH CV ECHO MEAS - MV E MAX VEL: 102 CM/SEC
BH CV ECHO MEAS - MV E/A: 1.07
BH CV ECHO MEAS - MV MAX PG: 4.8 MMHG
BH CV ECHO MEAS - MV MEAN PG: 1.9 MMHG
BH CV ECHO MEAS - MV P1/2T: 41.2 MSEC
BH CV ECHO MEAS - MV V2 VTI: 27.7 CM
BH CV ECHO MEAS - MVA(P1/2T): 5.3 CM2
BH CV ECHO MEAS - MVA(VTI): 2.04 CM2
BH CV ECHO MEAS - PA ACC TIME: 0.13 SEC
BH CV ECHO MEAS - PA V2 MAX: 92.3 CM/SEC
BH CV ECHO MEAS - PULM A REVS DUR: 0.1 SEC
BH CV ECHO MEAS - PULM A REVS VEL: 21.4 CM/SEC
BH CV ECHO MEAS - PULM DIAS VEL: 69.8 CM/SEC
BH CV ECHO MEAS - PULM S/D: 0.88
BH CV ECHO MEAS - PULM SYS VEL: 61.7 CM/SEC
BH CV ECHO MEAS - QP/QS: 1.01
BH CV ECHO MEAS - RV MAX PG: 3.3 MMHG
BH CV ECHO MEAS - RV V1 MAX: 90.4 CM/SEC
BH CV ECHO MEAS - RV V1 VTI: 21.3 CM
BH CV ECHO MEAS - RVOT DIAM: 1.84 CM
BH CV ECHO MEAS - RVSP: 25 MMHG
BH CV ECHO MEAS - SUP REN AO DIAM: 2.1 CM
BH CV ECHO MEAS - SV(LVOT): 56.5 ML
BH CV ECHO MEAS - SV(MOD-SP2): 49 ML
BH CV ECHO MEAS - SV(MOD-SP4): 43 ML
BH CV ECHO MEAS - SV(RVOT): 56.9 ML
BH CV ECHO MEAS - SVI(LVOT): 34 ML/M2
BH CV ECHO MEAS - SVI(MOD-SP2): 29.5 ML/M2
BH CV ECHO MEAS - SVI(MOD-SP4): 25.9 ML/M2
BH CV ECHO MEAS - TAPSE (>1.6): 1.69 CM
BH CV ECHO MEAS - TR MAX PG: 21 MMHG
BH CV ECHO MEAS - TR MAX VEL: 229.4 CM/SEC
BH CV ECHO MEASUREMENTS AVERAGE E/E' RATIO: 13.42
BH CV XLRA - RV BASE: 3 CM
BH CV XLRA - RV LENGTH: 5.7 CM
BH CV XLRA - RV MID: 2.36 CM
BH CV XLRA - TDI S': 13.7 CM/SEC
LEFT ATRIUM VOLUME INDEX: 42.7 ML/M2
LV EF BIPLANE MOD: 61.8 %
SINUS: 3.3 CM
STJ: 2.5 CM

## 2025-08-14 PROCEDURE — 93356 MYOCRD STRAIN IMG SPCKL TRCK: CPT

## 2025-08-14 PROCEDURE — 93306 TTE W/DOPPLER COMPLETE: CPT

## 2025-08-17 ENCOUNTER — RESULTS FOLLOW-UP (OUTPATIENT)
Dept: CARDIOLOGY | Facility: CLINIC | Age: OVER 89
End: 2025-08-17
Payer: MEDICARE

## 2025-08-27 ENCOUNTER — HOSPITAL ENCOUNTER (OUTPATIENT)
Facility: HOSPITAL | Age: OVER 89
Setting detail: OBSERVATION
Discharge: HOME | End: 2025-08-30
Attending: EMERGENCY MEDICINE | Admitting: INTERNAL MEDICINE
Payer: MEDICARE

## 2025-08-27 DIAGNOSIS — K62.5 RECTAL BLEED: ICD-10-CM

## 2025-08-27 DIAGNOSIS — K92.1 GASTROINTESTINAL HEMORRHAGE WITH MELENA: Primary | ICD-10-CM

## 2025-08-27 LAB
ABO GROUP BLD: NORMAL
ALBUMIN SERPL-MCNC: 4.1 G/DL (ref 3.5–5.2)
ALBUMIN/GLOB SERPL: 1.6 G/DL
ALP SERPL-CCNC: 67 U/L (ref 39–117)
ALT SERPL W P-5'-P-CCNC: 9 U/L (ref 1–33)
ANION GAP SERPL CALCULATED.3IONS-SCNC: 9 MMOL/L (ref 5–15)
AST SERPL-CCNC: 18 U/L (ref 1–32)
BASOPHILS # BLD AUTO: 0.02 10*3/MM3 (ref 0–0.2)
BASOPHILS NFR BLD AUTO: 0.5 % (ref 0–1.5)
BILIRUB SERPL-MCNC: 0.3 MG/DL (ref 0–1.2)
BLD GP AB SCN SERPL QL: NEGATIVE
BUN SERPL-MCNC: 13 MG/DL (ref 8–23)
BUN/CREAT SERPL: 17.8 (ref 7–25)
CALCIUM SPEC-SCNC: 9.7 MG/DL (ref 8.2–9.6)
CHLORIDE SERPL-SCNC: 98 MMOL/L (ref 98–107)
CO2 SERPL-SCNC: 27 MMOL/L (ref 22–29)
CREAT SERPL-MCNC: 0.73 MG/DL (ref 0.57–1)
D-LACTATE SERPL-SCNC: 0.7 MMOL/L (ref 0.5–2)
DEPRECATED RDW RBC AUTO: 43.5 FL (ref 37–54)
EGFRCR SERPLBLD CKD-EPI 2021: 74.9 ML/MIN/1.73
EOSINOPHIL # BLD AUTO: 0.06 10*3/MM3 (ref 0–0.4)
EOSINOPHIL NFR BLD AUTO: 1.4 % (ref 0.3–6.2)
ERYTHROCYTE [DISTWIDTH] IN BLOOD BY AUTOMATED COUNT: 12 % (ref 12.3–15.4)
FERRITIN SERPL-MCNC: 50.7 NG/ML (ref 13–150)
GLOBULIN UR ELPH-MCNC: 2.6 GM/DL
GLUCOSE SERPL-MCNC: 103 MG/DL (ref 65–99)
HCT VFR BLD AUTO: 36.4 % (ref 34–46.6)
HGB BLD-MCNC: 12.1 G/DL (ref 12–15.9)
HOLD SPECIMEN: NORMAL
HOLD SPECIMEN: NORMAL
IMM GRANULOCYTES # BLD AUTO: 0.02 10*3/MM3 (ref 0–0.05)
IMM GRANULOCYTES NFR BLD AUTO: 0.5 % (ref 0–0.5)
INR PPP: 1.22 (ref 0.9–1.1)
IRON 24H UR-MRATE: 79 MCG/DL (ref 37–145)
IRON SATN MFR SERPL: 21 % (ref 20–50)
LYMPHOCYTES # BLD AUTO: 1.19 10*3/MM3 (ref 0.7–3.1)
LYMPHOCYTES NFR BLD AUTO: 26.9 % (ref 19.6–45.3)
MCH RBC QN AUTO: 32.8 PG (ref 26.6–33)
MCHC RBC AUTO-ENTMCNC: 33.2 G/DL (ref 31.5–35.7)
MCV RBC AUTO: 98.6 FL (ref 79–97)
MONOCYTES # BLD AUTO: 0.39 10*3/MM3 (ref 0.1–0.9)
MONOCYTES NFR BLD AUTO: 8.8 % (ref 5–12)
NEUTROPHILS NFR BLD AUTO: 2.75 10*3/MM3 (ref 1.7–7)
NEUTROPHILS NFR BLD AUTO: 61.9 % (ref 42.7–76)
NRBC BLD AUTO-RTO: 0 /100 WBC (ref 0–0.2)
PLATELET # BLD AUTO: 204 10*3/MM3 (ref 140–450)
PMV BLD AUTO: 9.4 FL (ref 6–12)
POTASSIUM SERPL-SCNC: 4.5 MMOL/L (ref 3.5–5.2)
PROT SERPL-MCNC: 6.7 G/DL (ref 6–8.5)
PROTHROMBIN TIME: 15.4 SECONDS (ref 11.7–14.2)
RBC # BLD AUTO: 3.69 10*6/MM3 (ref 3.77–5.28)
RH BLD: POSITIVE
SODIUM SERPL-SCNC: 134 MMOL/L (ref 136–145)
T&S EXPIRATION DATE: NORMAL
TIBC SERPL-MCNC: 375 MCG/DL (ref 298–536)
TRANSFERRIN SERPL-MCNC: 252 MG/DL (ref 200–360)
WBC NRBC COR # BLD AUTO: 4.43 10*3/MM3 (ref 3.4–10.8)
WHOLE BLOOD HOLD COAG: NORMAL
WHOLE BLOOD HOLD SPECIMEN: NORMAL

## 2025-08-27 PROCEDURE — 85610 PROTHROMBIN TIME: CPT | Performed by: INTERNAL MEDICINE

## 2025-08-27 PROCEDURE — 83605 ASSAY OF LACTIC ACID: CPT

## 2025-08-27 PROCEDURE — 82728 ASSAY OF FERRITIN: CPT | Performed by: INTERNAL MEDICINE

## 2025-08-27 PROCEDURE — G0378 HOSPITAL OBSERVATION PER HR: HCPCS

## 2025-08-27 PROCEDURE — 25810000003 SODIUM CHLORIDE 0.9 % SOLUTION: Performed by: INTERNAL MEDICINE

## 2025-08-27 PROCEDURE — 86900 BLOOD TYPING SEROLOGIC ABO: CPT | Performed by: EMERGENCY MEDICINE

## 2025-08-27 PROCEDURE — 86901 BLOOD TYPING SEROLOGIC RH(D): CPT | Performed by: EMERGENCY MEDICINE

## 2025-08-27 PROCEDURE — 80053 COMPREHEN METABOLIC PANEL: CPT

## 2025-08-27 PROCEDURE — 83540 ASSAY OF IRON: CPT | Performed by: INTERNAL MEDICINE

## 2025-08-27 PROCEDURE — 85025 COMPLETE CBC W/AUTO DIFF WBC: CPT

## 2025-08-27 PROCEDURE — 86850 RBC ANTIBODY SCREEN: CPT | Performed by: EMERGENCY MEDICINE

## 2025-08-27 PROCEDURE — 84466 ASSAY OF TRANSFERRIN: CPT | Performed by: INTERNAL MEDICINE

## 2025-08-27 RX ORDER — ONDANSETRON 2 MG/ML
4 INJECTION INTRAMUSCULAR; INTRAVENOUS EVERY 6 HOURS PRN
Status: DISCONTINUED | OUTPATIENT
Start: 2025-08-27 | End: 2025-08-30 | Stop reason: HOSPADM

## 2025-08-27 RX ORDER — ASCORBIC ACID 500 MG
500 TABLET ORAL DAILY
Status: DISCONTINUED | OUTPATIENT
Start: 2025-08-28 | End: 2025-08-30 | Stop reason: HOSPADM

## 2025-08-27 RX ORDER — DOCUSATE SODIUM 100 MG/1
100 CAPSULE, LIQUID FILLED ORAL 2 TIMES DAILY
COMMUNITY

## 2025-08-27 RX ORDER — ASPIRIN 81 MG/1
81 TABLET ORAL DAILY
Status: DISCONTINUED | OUTPATIENT
Start: 2025-08-28 | End: 2025-08-30 | Stop reason: HOSPADM

## 2025-08-27 RX ORDER — CETIRIZINE HYDROCHLORIDE 10 MG/1
10 TABLET ORAL DAILY
Status: DISCONTINUED | OUTPATIENT
Start: 2025-08-28 | End: 2025-08-30 | Stop reason: HOSPADM

## 2025-08-27 RX ORDER — DOCUSATE SODIUM 100 MG/1
100 CAPSULE, LIQUID FILLED ORAL 2 TIMES DAILY
Status: DISCONTINUED | OUTPATIENT
Start: 2025-08-27 | End: 2025-08-30 | Stop reason: HOSPADM

## 2025-08-27 RX ORDER — SODIUM CHLORIDE 0.9 % (FLUSH) 0.9 %
10 SYRINGE (ML) INJECTION AS NEEDED
Status: DISCONTINUED | OUTPATIENT
Start: 2025-08-27 | End: 2025-08-30 | Stop reason: HOSPADM

## 2025-08-27 RX ORDER — ONDANSETRON 4 MG/1
4 TABLET, ORALLY DISINTEGRATING ORAL EVERY 6 HOURS PRN
Status: DISCONTINUED | OUTPATIENT
Start: 2025-08-27 | End: 2025-08-30 | Stop reason: HOSPADM

## 2025-08-27 RX ORDER — SODIUM CHLORIDE 9 MG/ML
100 INJECTION, SOLUTION INTRAVENOUS CONTINUOUS
Status: ACTIVE | OUTPATIENT
Start: 2025-08-27 | End: 2025-08-28

## 2025-08-27 RX ORDER — CITALOPRAM HYDROBROMIDE 20 MG/1
10 TABLET ORAL DAILY
Status: DISCONTINUED | OUTPATIENT
Start: 2025-08-28 | End: 2025-08-30 | Stop reason: HOSPADM

## 2025-08-27 RX ORDER — PANTOPRAZOLE SODIUM 40 MG/10ML
80 INJECTION, POWDER, LYOPHILIZED, FOR SOLUTION INTRAVENOUS ONCE
Status: COMPLETED | OUTPATIENT
Start: 2025-08-27 | End: 2025-08-27

## 2025-08-27 RX ORDER — ROSUVASTATIN CALCIUM 20 MG/1
20 TABLET, COATED ORAL NIGHTLY
Status: DISCONTINUED | OUTPATIENT
Start: 2025-08-27 | End: 2025-08-30 | Stop reason: HOSPADM

## 2025-08-27 RX ORDER — FLUTICASONE PROPIONATE 50 MCG
1 SPRAY, SUSPENSION (ML) NASAL AS NEEDED
Status: DISCONTINUED | OUTPATIENT
Start: 2025-08-27 | End: 2025-08-30 | Stop reason: HOSPADM

## 2025-08-27 RX ORDER — PANTOPRAZOLE SODIUM 40 MG/1
40 TABLET, DELAYED RELEASE ORAL 2 TIMES DAILY
Status: DISCONTINUED | OUTPATIENT
Start: 2025-08-27 | End: 2025-08-27

## 2025-08-27 RX ORDER — MULTIPLE VITAMINS W/ MINERALS TAB 9MG-400MCG
1 TAB ORAL DAILY
Status: DISCONTINUED | OUTPATIENT
Start: 2025-08-28 | End: 2025-08-30 | Stop reason: HOSPADM

## 2025-08-27 RX ADMIN — PANTOPRAZOLE SODIUM 8 MG/HR: 40 INJECTION, POWDER, FOR SOLUTION INTRAVENOUS at 16:30

## 2025-08-27 RX ADMIN — ROSUVASTATIN CALCIUM 20 MG: 20 TABLET, FILM COATED ORAL at 21:29

## 2025-08-27 RX ADMIN — Medication 2.5 MG: at 21:29

## 2025-08-27 RX ADMIN — SODIUM CHLORIDE 100 ML/HR: 9 INJECTION, SOLUTION INTRAVENOUS at 17:32

## 2025-08-27 RX ADMIN — PANTOPRAZOLE SODIUM 80 MG: 40 INJECTION, POWDER, LYOPHILIZED, FOR SOLUTION INTRAVENOUS at 16:28

## 2025-08-27 RX ADMIN — PANTOPRAZOLE SODIUM 8 MG/HR: 40 INJECTION, POWDER, FOR SOLUTION INTRAVENOUS at 21:04

## 2025-08-27 RX ADMIN — DOCUSATE SODIUM 100 MG: 100 CAPSULE, LIQUID FILLED ORAL at 21:30

## 2025-08-28 LAB
ANION GAP SERPL CALCULATED.3IONS-SCNC: 6.5 MMOL/L (ref 5–15)
BUN SERPL-MCNC: 10 MG/DL (ref 8–23)
BUN/CREAT SERPL: 18.2 (ref 7–25)
CALCIUM SPEC-SCNC: 8.3 MG/DL (ref 8.2–9.6)
CHLORIDE SERPL-SCNC: 105 MMOL/L (ref 98–107)
CO2 SERPL-SCNC: 26.5 MMOL/L (ref 22–29)
CREAT SERPL-MCNC: 0.55 MG/DL (ref 0.57–1)
DEPRECATED RDW RBC AUTO: 44.4 FL (ref 37–54)
EGFRCR SERPLBLD CKD-EPI 2021: 83.5 ML/MIN/1.73
ERYTHROCYTE [DISTWIDTH] IN BLOOD BY AUTOMATED COUNT: 12.4 % (ref 12.3–15.4)
GLUCOSE SERPL-MCNC: 89 MG/DL (ref 65–99)
HCT VFR BLD AUTO: 32.5 % (ref 34–46.6)
HCT VFR BLD AUTO: 32.5 % (ref 34–46.6)
HGB BLD-MCNC: 10.5 G/DL (ref 12–15.9)
HGB BLD-MCNC: 10.7 G/DL (ref 12–15.9)
MCH RBC QN AUTO: 32.3 PG (ref 26.6–33)
MCHC RBC AUTO-ENTMCNC: 32.9 G/DL (ref 31.5–35.7)
MCV RBC AUTO: 98.2 FL (ref 79–97)
PLATELET # BLD AUTO: 174 10*3/MM3 (ref 140–450)
PMV BLD AUTO: 9.9 FL (ref 6–12)
POTASSIUM SERPL-SCNC: 3.9 MMOL/L (ref 3.5–5.2)
RBC # BLD AUTO: 3.31 10*6/MM3 (ref 3.77–5.28)
SODIUM SERPL-SCNC: 138 MMOL/L (ref 136–145)
WBC NRBC COR # BLD AUTO: 3.33 10*3/MM3 (ref 3.4–10.8)

## 2025-08-28 PROCEDURE — 80048 BASIC METABOLIC PNL TOTAL CA: CPT | Performed by: INTERNAL MEDICINE

## 2025-08-28 PROCEDURE — 36415 COLL VENOUS BLD VENIPUNCTURE: CPT | Performed by: INTERNAL MEDICINE

## 2025-08-28 PROCEDURE — 85018 HEMOGLOBIN: CPT | Performed by: STUDENT IN AN ORGANIZED HEALTH CARE EDUCATION/TRAINING PROGRAM

## 2025-08-28 PROCEDURE — 85027 COMPLETE CBC AUTOMATED: CPT | Performed by: INTERNAL MEDICINE

## 2025-08-28 PROCEDURE — G0378 HOSPITAL OBSERVATION PER HR: HCPCS

## 2025-08-28 PROCEDURE — 85014 HEMATOCRIT: CPT | Performed by: STUDENT IN AN ORGANIZED HEALTH CARE EDUCATION/TRAINING PROGRAM

## 2025-08-28 RX ORDER — POLYETHYLENE GLYCOL 3350 17 G/17G
17 POWDER, FOR SOLUTION ORAL DAILY
Status: DISCONTINUED | OUTPATIENT
Start: 2025-08-28 | End: 2025-08-30 | Stop reason: HOSPADM

## 2025-08-28 RX ORDER — HYDROCHLOROTHIAZIDE 12.5 MG/1
12.5 TABLET ORAL DAILY
Status: DISCONTINUED | OUTPATIENT
Start: 2025-08-28 | End: 2025-08-29

## 2025-08-28 RX ADMIN — DOCUSATE SODIUM 100 MG: 100 CAPSULE, LIQUID FILLED ORAL at 09:43

## 2025-08-28 RX ADMIN — PANTOPRAZOLE SODIUM 8 MG/HR: 40 INJECTION, POWDER, FOR SOLUTION INTRAVENOUS at 12:11

## 2025-08-28 RX ADMIN — POLYETHYLENE GLYCOL 3350 17 G: 17 POWDER, FOR SOLUTION ORAL at 12:50

## 2025-08-28 RX ADMIN — CETIRIZINE HYDROCHLORIDE 10 MG: 10 TABLET, FILM COATED ORAL at 09:43

## 2025-08-28 RX ADMIN — DOCUSATE SODIUM 100 MG: 100 CAPSULE, LIQUID FILLED ORAL at 20:36

## 2025-08-28 RX ADMIN — Medication 1 TABLET: at 09:43

## 2025-08-28 RX ADMIN — CITALOPRAM 10 MG: 20 TABLET, FILM COATED ORAL at 09:43

## 2025-08-28 RX ADMIN — PANTOPRAZOLE SODIUM 8 MG/HR: 40 INJECTION, POWDER, FOR SOLUTION INTRAVENOUS at 07:17

## 2025-08-28 RX ADMIN — OXYCODONE HYDROCHLORIDE AND ACETAMINOPHEN 500 MG: 500 TABLET ORAL at 09:43

## 2025-08-28 RX ADMIN — PANTOPRAZOLE SODIUM 8 MG/HR: 40 INJECTION, POWDER, FOR SOLUTION INTRAVENOUS at 02:07

## 2025-08-28 RX ADMIN — ASPIRIN 81 MG: 81 TABLET, DELAYED RELEASE ORAL at 09:43

## 2025-08-28 RX ADMIN — HYDROCHLOROTHIAZIDE 12.5 MG: 12.5 TABLET ORAL at 12:50

## 2025-08-28 RX ADMIN — PANTOPRAZOLE SODIUM 8 MG/HR: 40 INJECTION, POWDER, FOR SOLUTION INTRAVENOUS at 17:50

## 2025-08-29 LAB
ANION GAP SERPL CALCULATED.3IONS-SCNC: 10 MMOL/L (ref 5–15)
BUN SERPL-MCNC: 12 MG/DL (ref 8–23)
BUN/CREAT SERPL: 19 (ref 7–25)
CALCIUM SPEC-SCNC: 8.2 MG/DL (ref 8.2–9.6)
CHLORIDE SERPL-SCNC: 100 MMOL/L (ref 98–107)
CO2 SERPL-SCNC: 25 MMOL/L (ref 22–29)
CREAT SERPL-MCNC: 0.63 MG/DL (ref 0.57–1)
DEPRECATED RDW RBC AUTO: 45.4 FL (ref 37–54)
EGFRCR SERPLBLD CKD-EPI 2021: 80.8 ML/MIN/1.73
ERYTHROCYTE [DISTWIDTH] IN BLOOD BY AUTOMATED COUNT: 12.5 % (ref 12.3–15.4)
GLUCOSE SERPL-MCNC: 95 MG/DL (ref 65–99)
HCT VFR BLD AUTO: 33.9 % (ref 34–46.6)
HGB BLD-MCNC: 11 G/DL (ref 12–15.9)
MCH RBC QN AUTO: 32.2 PG (ref 26.6–33)
MCHC RBC AUTO-ENTMCNC: 32.4 G/DL (ref 31.5–35.7)
MCV RBC AUTO: 99.1 FL (ref 79–97)
PLATELET # BLD AUTO: 170 10*3/MM3 (ref 140–450)
PMV BLD AUTO: 9.9 FL (ref 6–12)
POTASSIUM SERPL-SCNC: 3.7 MMOL/L (ref 3.5–5.2)
RBC # BLD AUTO: 3.42 10*6/MM3 (ref 3.77–5.28)
SODIUM SERPL-SCNC: 135 MMOL/L (ref 136–145)
WBC NRBC COR # BLD AUTO: 3.91 10*3/MM3 (ref 3.4–10.8)

## 2025-08-29 PROCEDURE — 85027 COMPLETE CBC AUTOMATED: CPT | Performed by: STUDENT IN AN ORGANIZED HEALTH CARE EDUCATION/TRAINING PROGRAM

## 2025-08-29 PROCEDURE — 80048 BASIC METABOLIC PNL TOTAL CA: CPT | Performed by: STUDENT IN AN ORGANIZED HEALTH CARE EDUCATION/TRAINING PROGRAM

## 2025-08-29 PROCEDURE — G0378 HOSPITAL OBSERVATION PER HR: HCPCS

## 2025-08-29 RX ORDER — PREDNISOLONE ACETATE 10 MG/ML
1 SUSPENSION/ DROPS OPHTHALMIC 2 TIMES DAILY
Status: DISCONTINUED | OUTPATIENT
Start: 2025-08-29 | End: 2025-08-30 | Stop reason: HOSPADM

## 2025-08-29 RX ORDER — HYDROCHLOROTHIAZIDE 25 MG/1
25 TABLET ORAL DAILY
Status: DISCONTINUED | OUTPATIENT
Start: 2025-08-30 | End: 2025-08-30

## 2025-08-29 RX ORDER — HYDROCHLOROTHIAZIDE 12.5 MG/1
12.5 TABLET ORAL ONCE
Status: COMPLETED | OUTPATIENT
Start: 2025-08-29 | End: 2025-08-29

## 2025-08-29 RX ORDER — ACETAMINOPHEN 325 MG/1
650 TABLET ORAL EVERY 6 HOURS PRN
Status: DISCONTINUED | OUTPATIENT
Start: 2025-08-29 | End: 2025-08-30 | Stop reason: HOSPADM

## 2025-08-29 RX ORDER — PANTOPRAZOLE SODIUM 40 MG/1
40 TABLET, DELAYED RELEASE ORAL
Status: DISCONTINUED | OUTPATIENT
Start: 2025-08-29 | End: 2025-08-30 | Stop reason: HOSPADM

## 2025-08-29 RX ORDER — PREDNISOLONE ACETATE 10 MG/ML
1 SUSPENSION/ DROPS OPHTHALMIC 4 TIMES DAILY
COMMUNITY

## 2025-08-29 RX ADMIN — PANTOPRAZOLE SODIUM 8 MG/HR: 40 INJECTION, POWDER, FOR SOLUTION INTRAVENOUS at 09:16

## 2025-08-29 RX ADMIN — HYDROCHLOROTHIAZIDE 12.5 MG: 12.5 TABLET ORAL at 09:14

## 2025-08-29 RX ADMIN — PREDNISOLONE ACETATE 1 DROP: 10 SUSPENSION/ DROPS OPHTHALMIC at 20:39

## 2025-08-29 RX ADMIN — HYDROCHLOROTHIAZIDE 12.5 MG: 12.5 TABLET ORAL at 14:50

## 2025-08-29 RX ADMIN — PREDNISOLONE ACETATE 1 DROP: 10 SUSPENSION/ DROPS OPHTHALMIC at 14:50

## 2025-08-29 RX ADMIN — ROSUVASTATIN CALCIUM 20 MG: 20 TABLET, FILM COATED ORAL at 20:36

## 2025-08-29 RX ADMIN — ASPIRIN 81 MG: 81 TABLET, DELAYED RELEASE ORAL at 09:14

## 2025-08-29 RX ADMIN — CETIRIZINE HYDROCHLORIDE 10 MG: 10 TABLET, FILM COATED ORAL at 09:14

## 2025-08-29 RX ADMIN — POLYETHYLENE GLYCOL 3350 17 G: 17 POWDER, FOR SOLUTION ORAL at 09:16

## 2025-08-29 RX ADMIN — PANTOPRAZOLE SODIUM 40 MG: 40 TABLET, DELAYED RELEASE ORAL at 11:54

## 2025-08-29 RX ADMIN — OXYCODONE HYDROCHLORIDE AND ACETAMINOPHEN 500 MG: 500 TABLET ORAL at 09:14

## 2025-08-29 RX ADMIN — PANTOPRAZOLE SODIUM 8 MG/HR: 40 INJECTION, POWDER, FOR SOLUTION INTRAVENOUS at 04:15

## 2025-08-29 RX ADMIN — DOCUSATE SODIUM 100 MG: 100 CAPSULE, LIQUID FILLED ORAL at 09:14

## 2025-08-29 RX ADMIN — CITALOPRAM 10 MG: 20 TABLET, FILM COATED ORAL at 09:14

## 2025-08-29 RX ADMIN — DOCUSATE SODIUM 100 MG: 100 CAPSULE, LIQUID FILLED ORAL at 20:39

## 2025-08-29 RX ADMIN — Medication 1 TABLET: at 09:14

## 2025-08-30 VITALS
BODY MASS INDEX: 22.2 KG/M2 | TEMPERATURE: 97.3 F | HEIGHT: 64 IN | OXYGEN SATURATION: 96 % | DIASTOLIC BLOOD PRESSURE: 50 MMHG | RESPIRATION RATE: 18 BRPM | HEART RATE: 81 BPM | WEIGHT: 130 LBS | SYSTOLIC BLOOD PRESSURE: 159 MMHG

## 2025-08-30 LAB
ANION GAP SERPL CALCULATED.3IONS-SCNC: 11.5 MMOL/L (ref 5–15)
BUN SERPL-MCNC: 14 MG/DL (ref 8–23)
BUN/CREAT SERPL: 21.2 (ref 7–25)
CALCIUM SPEC-SCNC: 8.1 MG/DL (ref 8.2–9.6)
CHLORIDE SERPL-SCNC: 96 MMOL/L (ref 98–107)
CO2 SERPL-SCNC: 21.5 MMOL/L (ref 22–29)
CREAT SERPL-MCNC: 0.66 MG/DL (ref 0.57–1)
DEPRECATED RDW RBC AUTO: 43.4 FL (ref 37–54)
EGFRCR SERPLBLD CKD-EPI 2021: 79.9 ML/MIN/1.73
ERYTHROCYTE [DISTWIDTH] IN BLOOD BY AUTOMATED COUNT: 12.1 % (ref 12.3–15.4)
GLUCOSE SERPL-MCNC: 97 MG/DL (ref 65–99)
HCT VFR BLD AUTO: 33.1 % (ref 34–46.6)
HGB BLD-MCNC: 10.9 G/DL (ref 12–15.9)
MCH RBC QN AUTO: 32.3 PG (ref 26.6–33)
MCHC RBC AUTO-ENTMCNC: 32.9 G/DL (ref 31.5–35.7)
MCV RBC AUTO: 98.2 FL (ref 79–97)
PLATELET # BLD AUTO: 180 10*3/MM3 (ref 140–450)
PMV BLD AUTO: 10.2 FL (ref 6–12)
POTASSIUM SERPL-SCNC: 4.2 MMOL/L (ref 3.5–5.2)
RBC # BLD AUTO: 3.37 10*6/MM3 (ref 3.77–5.28)
SODIUM SERPL-SCNC: 129 MMOL/L (ref 136–145)
WBC NRBC COR # BLD AUTO: 3.42 10*3/MM3 (ref 3.4–10.8)

## 2025-08-30 PROCEDURE — 80048 BASIC METABOLIC PNL TOTAL CA: CPT | Performed by: STUDENT IN AN ORGANIZED HEALTH CARE EDUCATION/TRAINING PROGRAM

## 2025-08-30 PROCEDURE — 85027 COMPLETE CBC AUTOMATED: CPT | Performed by: STUDENT IN AN ORGANIZED HEALTH CARE EDUCATION/TRAINING PROGRAM

## 2025-08-30 PROCEDURE — G0378 HOSPITAL OBSERVATION PER HR: HCPCS

## 2025-08-30 RX ORDER — POLYETHYLENE GLYCOL 3350 17 G/17G
17 POWDER, FOR SOLUTION ORAL DAILY
Qty: 510 G | Refills: 0 | Status: SHIPPED | OUTPATIENT
Start: 2025-08-31

## 2025-08-30 RX ORDER — AMLODIPINE BESYLATE 5 MG/1
5 TABLET ORAL
Status: DISCONTINUED | OUTPATIENT
Start: 2025-08-31 | End: 2025-08-30

## 2025-08-30 RX ADMIN — DOCUSATE SODIUM 100 MG: 100 CAPSULE, LIQUID FILLED ORAL at 09:25

## 2025-08-30 RX ADMIN — ASPIRIN 81 MG: 81 TABLET, DELAYED RELEASE ORAL at 09:26

## 2025-08-30 RX ADMIN — CITALOPRAM 10 MG: 20 TABLET, FILM COATED ORAL at 09:25

## 2025-08-30 RX ADMIN — OXYCODONE HYDROCHLORIDE AND ACETAMINOPHEN 500 MG: 500 TABLET ORAL at 09:25

## 2025-08-30 RX ADMIN — Medication 1 TABLET: at 09:25

## 2025-08-30 RX ADMIN — ACETAMINOPHEN 650 MG: 325 TABLET ORAL at 09:27

## 2025-08-30 RX ADMIN — HYDROCHLOROTHIAZIDE 25 MG: 25 TABLET ORAL at 09:25

## 2025-08-30 RX ADMIN — PANTOPRAZOLE SODIUM 40 MG: 40 TABLET, DELAYED RELEASE ORAL at 09:26

## 2025-08-30 RX ADMIN — CETIRIZINE HYDROCHLORIDE 10 MG: 10 TABLET, FILM COATED ORAL at 09:26

## 2025-08-30 RX ADMIN — POLYETHYLENE GLYCOL 3350 17 G: 17 POWDER, FOR SOLUTION ORAL at 09:25

## 2025-08-31 ENCOUNTER — READMISSION MANAGEMENT (OUTPATIENT)
Dept: CALL CENTER | Facility: HOSPITAL | Age: OVER 89
End: 2025-08-31
Payer: MEDICARE

## (undated) DEVICE — SENSR O2 OXIMAX FNGR A/ 18IN NONSTR

## (undated) DEVICE — CANN NASL CO2 TRULINK W/O2 A/

## (undated) DEVICE — KT VLV BIOGUARD SXN BIOP AIR/H20 CONN 4PC DISP

## (undated) DEVICE — THE TORRENT IRRIGATION SCOPE CONNECTOR IS USED WITH THE TORRENT IRRIGATION TUBING TO PROVIDE IRRIGATION FLUIDS SUCH AS STERILE WATER DURING GASTROINTESTINAL ENDOSCOPIC PROCEDURES WHEN USED IN CONJUNCTION WITH AN IRRIGATION PUMP (OR ELECTROSURGICAL UNIT).: Brand: TORRENT

## (undated) DEVICE — TUBING, SUCTION, 1/4" X 10', STRAIGHT: Brand: MEDLINE

## (undated) DEVICE — BITEBLOCK OMNI BLOC